# Patient Record
Sex: FEMALE | Race: OTHER | HISPANIC OR LATINO | Employment: FULL TIME | URBAN - METROPOLITAN AREA
[De-identification: names, ages, dates, MRNs, and addresses within clinical notes are randomized per-mention and may not be internally consistent; named-entity substitution may affect disease eponyms.]

---

## 2018-08-23 PROBLEM — Z00.00 HEALTH MAINTENANCE EXAMINATION: Status: RESOLVED | Noted: 2018-07-17 | Resolved: 2018-08-23

## 2018-10-03 PROBLEM — J30.9 ALLERGIC RHINITIS: Status: ACTIVE | Noted: 2018-10-03

## 2018-11-13 PROBLEM — F41.8 SITUATIONAL ANXIETY: Status: ACTIVE | Noted: 2018-11-13

## 2019-02-14 PROBLEM — J01.90 ACUTE SINUSITIS: Status: ACTIVE | Noted: 2019-02-14

## 2019-03-29 PROBLEM — J02.9 SORE THROAT: Status: ACTIVE | Noted: 2019-03-29

## 2019-06-04 PROBLEM — J02.9 SORE THROAT: Status: RESOLVED | Noted: 2019-03-29 | Resolved: 2019-06-04

## 2019-06-04 PROBLEM — H10.31 ACUTE BACTERIAL CONJUNCTIVITIS OF RIGHT EYE: Status: RESOLVED | Noted: 2019-03-29 | Resolved: 2019-06-04

## 2020-01-28 PROBLEM — J06.9 VIRAL URI: Status: RESOLVED | Noted: 2019-10-08 | Resolved: 2020-01-28

## 2020-03-31 PROBLEM — J45.20 MILD INTERMITTENT ASTHMA WITHOUT COMPLICATION: Status: ACTIVE | Noted: 2020-03-31

## 2020-07-15 PROBLEM — Z11.3 ROUTINE SCREENING FOR STI (SEXUALLY TRANSMITTED INFECTION): Status: RESOLVED | Noted: 2019-02-18 | Resolved: 2020-07-15

## 2021-01-13 PROBLEM — R11.0 NAUSEA: Status: RESOLVED | Noted: 2019-10-08 | Resolved: 2021-01-13

## 2021-09-28 PROBLEM — Z00.00 ANNUAL PHYSICAL EXAM: Status: ACTIVE | Noted: 2021-09-28

## 2022-10-14 ENCOUNTER — OFFICE VISIT (OUTPATIENT)
Dept: INTERNAL MEDICINE CLINIC | Facility: OTHER | Age: 27
End: 2022-10-14
Payer: COMMERCIAL

## 2022-10-14 VITALS
SYSTOLIC BLOOD PRESSURE: 100 MMHG | TEMPERATURE: 99.1 F | OXYGEN SATURATION: 98 % | WEIGHT: 146 LBS | HEART RATE: 80 BPM | DIASTOLIC BLOOD PRESSURE: 68 MMHG | HEIGHT: 62 IN | BODY MASS INDEX: 26.87 KG/M2

## 2022-10-14 DIAGNOSIS — Z13.220 SCREENING FOR LIPID DISORDERS: ICD-10-CM

## 2022-10-14 DIAGNOSIS — Z00.00 ANNUAL PHYSICAL EXAM: Primary | ICD-10-CM

## 2022-10-14 DIAGNOSIS — Z23 NEED FOR TDAP VACCINATION: ICD-10-CM

## 2022-10-14 DIAGNOSIS — M54.6 ACUTE LEFT-SIDED THORACIC BACK PAIN: ICD-10-CM

## 2022-10-14 DIAGNOSIS — Z13.0 SCREENING FOR DEFICIENCY ANEMIA: ICD-10-CM

## 2022-10-14 DIAGNOSIS — Z13.1 SCREENING FOR DIABETES MELLITUS: ICD-10-CM

## 2022-10-14 DIAGNOSIS — Z23 FLU VACCINE NEED: ICD-10-CM

## 2022-10-14 PROBLEM — B34.9 VIRAL INFECTION, UNSPECIFIED: Status: RESOLVED | Noted: 2022-01-14 | Resolved: 2022-10-14

## 2022-10-14 PROCEDURE — 90715 TDAP VACCINE 7 YRS/> IM: CPT | Performed by: NURSE PRACTITIONER

## 2022-10-14 PROCEDURE — 90472 IMMUNIZATION ADMIN EACH ADD: CPT | Performed by: NURSE PRACTITIONER

## 2022-10-14 PROCEDURE — 99395 PREV VISIT EST AGE 18-39: CPT | Performed by: NURSE PRACTITIONER

## 2022-10-14 PROCEDURE — 90686 IIV4 VACC NO PRSV 0.5 ML IM: CPT | Performed by: NURSE PRACTITIONER

## 2022-10-14 PROCEDURE — 90471 IMMUNIZATION ADMIN: CPT | Performed by: NURSE PRACTITIONER

## 2022-10-14 RX ORDER — METHOCARBAMOL 500 MG/1
500 TABLET, FILM COATED ORAL
Qty: 30 TABLET | Refills: 0 | Status: SHIPPED | OUTPATIENT
Start: 2022-10-14

## 2022-10-14 NOTE — ASSESSMENT & PLAN NOTE
- palpable knot on exam  - start Robaxin q h s  P r n   - start Aleve p r n   - moist heat to affected area  - light stretching  - massage therapy

## 2022-10-14 NOTE — ASSESSMENT & PLAN NOTE
- healthy 23-year-old female  - update routine labs  - continue healthy diet and routine exercise  - influenza and Tdap vaccines given today

## 2022-10-14 NOTE — PATIENT INSTRUCTIONS
Start robaxin before bed, do not drive on this medication  Aleve twice a day  Moist heat  Light stretching       Wellness Visit for Adults   AMBULATORY CARE:   A wellness visit  is when you see your healthcare provider to get screened for health problems  Your healthcare provider will also give you advice on how to stay healthy  Write down your questions so you remember to ask them  Ask your healthcare provider how often you should have a wellness visit  What happens at a wellness visit:  Your healthcare provider will ask about your health, and your family history of health problems  This includes high blood pressure, heart disease, and cancer  He or she will ask if you have symptoms that concern you, if you smoke, and about your mood  You may also be asked about your intake of medicines, supplements, food, and alcohol  Any of the following may be done: Your weight  will be checked  Your height may also be checked so your body mass index (BMI) can be calculated  Your BMI shows if you are at a healthy weight  Your blood pressure  and heart rate will be checked  Your temperature may also be checked  Blood and urine tests  may be done  Blood tests may be done to check your cholesterol levels  Abnormal cholesterol levels increase your risk for heart disease and stroke  You may also need a blood or urine test to check for diabetes if you are at increased risk  Urine tests may be done to look for signs of an infection or kidney disease  A physical exam  includes checking your heartbeat and lungs with a stethoscope  Your healthcare provider may also check your skin to look for sun damage  Screening tests  may be recommended  A screening test is done to check for diseases that may not cause symptoms  The screening tests you may need depend on your age, gender, family history, and lifestyle habits  For example, colorectal screening may be recommended if you are 48years old or older      Screening tests you need if you are a woman:   A Pap smear  is used to screen for cervical cancer  Pap smears are usually done every 3 to 5 years depending on your age  You may need them more often if you have had abnormal Pap smear test results in the past  Ask your healthcare provider how often you should have a Pap smear  A mammogram  is an x-ray of your breasts to screen for breast cancer  Experts recommend mammograms every 2 years starting at age 48 years  You may need a mammogram at age 52 years or younger if you have an increased risk for breast cancer  Talk to your healthcare provider about when you should start having mammograms and how often you need them  Vaccines you may need:   Get an influenza vaccine  every year  The influenza vaccine protects you from the flu  Several types of viruses cause the flu  The viruses change over time, so new vaccines are made each year  Get a tetanus-diphtheria (Td) booster vaccine  every 10 years  This vaccine protects you against tetanus and diphtheria  Tetanus is a severe infection that may cause painful muscle spasms and lockjaw  Diphtheria is a severe bacterial infection that causes a thick covering in the back of your mouth and throat  Get a human papillomavirus (HPV) vaccine  if you are female and aged 23 to 32 or male 23 to 24 and never received it  This vaccine protects you from HPV infection  HPV is the most common infection spread by sexual contact  HPV may also cause vaginal, penile, and anal cancers  Get a pneumococcal vaccine  if you are aged 72 years or older  The pneumococcal vaccine is an injection given to protect you from pneumococcal disease  Pneumococcal disease is an infection caused by pneumococcal bacteria  The infection may cause pneumonia, meningitis, or an ear infection  Get a shingles vaccine  if you are 60 or older, even if you have had shingles before  The shingles vaccine is an injection to protect you from the varicella-zoster virus   This is the same virus that causes chickenpox  Shingles is a painful rash that develops in people who had chickenpox or have been exposed to the virus  How to eat healthy:  My Plate is a model for planning healthy meals  It shows the types and amounts of foods that should go on your plate  Fruits and vegetables make up about half of your plate, and grains and protein make up the other half  A serving of dairy is included on the side of your plate  The amount of calories and serving sizes you need depends on your age, gender, weight, and height  Examples of healthy foods are listed below:  Eat a variety of vegetables  such as dark green, red, and orange vegetables  You can also include canned vegetables low in sodium (salt) and frozen vegetables without added butter or sauces  Eat a variety of fresh fruits , canned fruit in 100% juice, frozen fruit, and dried fruit  Include whole grains  At least half of the grains you eat should be whole grains  Examples include whole-wheat bread, wheat pasta, brown rice, and whole-grain cereals such as oatmeal     Eat a variety of protein foods such as seafood (fish and shellfish), lean meat, and poultry without skin (turkey and chicken)  Examples of lean meats include pork leg, shoulder, or tenderloin, and beef round, sirloin, tenderloin, and extra lean ground beef  Other protein foods include eggs and egg substitutes, beans, peas, soy products, nuts, and seeds  Choose low-fat dairy products such as skim or 1% milk or low-fat yogurt, cheese, and cottage cheese  Limit unhealthy fats  such as butter, hard margarine, and shortening  Exercise:  Exercise at least 30 minutes per day on most days of the week  Some examples of exercise include walking, biking, dancing, and swimming  You can also fit in more physical activity by taking the stairs instead of the elevator or parking farther away from stores  Include muscle strengthening activities 2 days each week   Regular exercise provides many health benefits  It helps you manage your weight, and decreases your risk for type 2 diabetes, heart disease, stroke, and high blood pressure  Exercise can also help improve your mood  Ask your healthcare provider about the best exercise plan for you  General health and safety guidelines:   Do not smoke  Nicotine and other chemicals in cigarettes and cigars can cause lung damage  Ask your healthcare provider for information if you currently smoke and need help to quit  E-cigarettes or smokeless tobacco still contain nicotine  Talk to your healthcare provider before you use these products  Limit alcohol  A drink of alcohol is 12 ounces of beer, 5 ounces of wine, or 1½ ounces of liquor  Lose weight, if needed  Being overweight increases your risk of certain health conditions  These include heart disease, high blood pressure, type 2 diabetes, and certain types of cancer  Protect your skin  Do not sunbathe or use tanning beds  Use sunscreen with a SPF 15 or higher  Apply sunscreen at least 15 minutes before you go outside  Reapply sunscreen every 2 hours  Wear protective clothing, hats, and sunglasses when you are outside  Drive safely  Always wear your seatbelt  Make sure everyone in your car wears a seatbelt  A seatbelt can save your life if you are in an accident  Do not use your cell phone when you are driving  This could distract you and cause an accident  Pull over if you need to make a call or send a text message  Practice safe sex  Use latex condoms if are sexually active and have more than one partner  Your healthcare provider may recommend screening tests for sexually transmitted infections (STIs)  Wear helmets, lifejackets, and protective gear  Always wear a helmet when you ride a bike or motorcycle, go skiing, or play sports that could cause a head injury  Wear protective equipment when you play sports   Wear a lifejacket when you are on a boat or doing water sports  © Copyright [employer] 2022 Information is for End User's use only and may not be sold, redistributed or otherwise used for commercial purposes  All illustrations and images included in CareNotes® are the copyrighted property of A D A M , Inc  or Kelly Sanchez  The above information is an  only  It is not intended as medical advice for individual conditions or treatments  Talk to your doctor, nurse or pharmacist before following any medical regimen to see if it is safe and effective for you

## 2022-10-14 NOTE — PROGRESS NOTES
ADULT ANNUAL [address] [facility] PRIMARY CARE [place]    NAME: Unruly Reid  AGE: 33 y o  SEX: female  : 1995     DATE: 10/14/2022     Assessment and Plan:     Problem List Items Addressed This Visit        Other    Annual physical exam - Primary     - healthy 52-year-old female  - update routine labs  - continue healthy diet and routine exercise  - influenza and Tdap vaccines given today         Acute left-sided thoracic back pain     - palpable knot on exam  - start Robaxin q h s  P r n   - start Aleve p r n   - moist heat to affected area  - light stretching  - massage therapy         Relevant Medications    methocarbamol (ROBAXIN) 500 mg tablet      Other Visit Diagnoses     Flu vaccine need        Relevant Orders    influenza vaccine, quadrivalent, 0 5 mL, preservative-free, for adult and pediatric patients 6 mos+ (AFLURIA, FLUARIX, FLULAVAL, FLUZONE) (Completed)    Screening for lipid disorders        Relevant Orders    Lipid Panel with Direct LDL reflex    Screening for deficiency anemia        Relevant Orders    CBC and differential    Screening for diabetes mellitus        Relevant Orders    Comprehensive metabolic panel    Need for Tdap vaccination        Relevant Orders    TDAP VACCINE GREATER THAN OR EQUAL TO 6YO IM (Completed)          Immunizations and preventive care screenings were discussed with patient today  Appropriate education was printed on patient's after visit summary  Depression Screening and Follow-up Plan: Patient was screened for depression during today's encounter  They screened negative with a PHQ-2 score of 0  BMI Counseling: Body mass index is 26 7 kg/m²  The BMI is above normal  Nutrition recommendations include 3-5 servings of fruits/vegetables daily, moderation in carbohydrate intake, increasing intake of lean protein and reducing intake of saturated fat and trans fat   Exercise recommendations include moderate aerobic physical activity for 150 minutes/week  Return in about 1 year (around 10/14/2023) for Annual physical      Chief Complaint:     Chief Complaint   Patient presents with   • Physical Exam     yearly   • Immunizations     Flu & adacel      History of Present Illness:     Adult Annual Physical   Patient here for a comprehensive physical exam  She is concerned for pain behind her left shoulder about 1 5 weeks ago  The pain radiates down her left arm intermittently  She can feel the strain with neck movement  Diet and Physical Activity  · Diet/Nutrition: well balanced diet  · Exercise: 3-4 times a week on average  Depression Screening  PHQ-2/9 Depression Screening    Little interest or pleasure in doing things: 0 - not at all  Feeling down, depressed, or hopeless: 0 - not at all  PHQ-2 Score: 0  PHQ-2 Interpretation: Negative depression screen       General Health  · Sleep: sleeps well  6-7 hours per night   · Hearing: normal - bilateral   · Vision: no vision problems and wears glasses  · Dental: due for dental exam      /GYN Health  · Last menstrual period: 9/16/2022, regular 7 days   · Contraceptive method: barrier methods  Review of Systems:     Review of Systems   Constitutional: Negative for activity change, appetite change, chills, diaphoresis, fatigue, fever and unexpected weight change  Eyes: Negative for visual disturbance  Respiratory: Negative for cough, chest tightness, shortness of breath and wheezing  Cardiovascular: Negative for chest pain and palpitations  Gastrointestinal: Negative for abdominal distention, abdominal pain, blood in stool, constipation, diarrhea, nausea and vomiting  Genitourinary: Negative for difficulty urinating, dysuria, frequency, hematuria, urgency, vaginal bleeding, vaginal discharge and vaginal pain  Musculoskeletal: Positive for arthralgias (left shoulder) and neck pain  Skin: Negative for rash     Neurological: Negative for dizziness, seizures, syncope, light-headedness and headaches  Psychiatric/Behavioral: Negative for dysphoric mood and sleep disturbance  The patient is not nervous/anxious  Past Medical History:     Past Medical History:   Diagnosis Date   • Acute sinusitis 2/14/2019   • Acute suppurative otitis media of right ear without spontaneous rupture of tympanic membrane 2/15/2019   • Asthma     stable on albuterol inhaler   • Central retinal vein occlusion of left eye 2020   • Encntr for gyn exam (general) (routine) w/o abn findings 2/18/2019   • Situational anxiety 11/13/2018   • Viral URI with cough 8/23/2018      Past Surgical History:     Past Surgical History:   Procedure Laterality Date   • CHOLECYSTECTOMY  12/2017   • WISDOM TOOTH EXTRACTION        Social History:     Social History     Socioeconomic History   • Marital status: Single     Spouse name: None   • Number of children: None   • Years of education: None   • Highest education level: None   Occupational History   • None   Tobacco Use   • Smoking status: Never Smoker   • Smokeless tobacco: Never Used   Vaping Use   • Vaping Use: Never used   Substance and Sexual Activity   • Alcohol use:  Yes     Alcohol/week: 1 0 standard drink     Types: 1 Glasses of wine per week     Comment: social   • Drug use: No   • Sexual activity: Yes     Partners: Male     Birth control/protection: Condom Male   Other Topics Concern   • None   Social History Narrative   • None     Social Determinants of Health     Financial Resource Strain: Not on file   Food Insecurity: Not on file   Transportation Needs: Not on file   Physical Activity: Not on file   Stress: Not on file   Social Connections: Not on file   Intimate Partner Violence: Not on file   Housing Stability: Not on file      Family History:     Family History   Problem Relation Age of Onset   • Hypertension Mother    • Diabetes Mother    • SRUTHI disease Mother    • Hyperlipidemia Mother    • Hyperlipidemia Father    • Asthma Father    • Stroke Maternal Grandmother    • Hypertension Maternal Grandmother    • Cancer Maternal Grandfather         Prostate and Lung Cancer   • Stroke Paternal Grandmother    • Diabetes Paternal Grandfather    • Heart disease Paternal Grandfather       Current Medications:     Current Outpatient Medications   Medication Sig Dispense Refill   • Calcium Carbonate (CALCIUM 600 PO) Take by mouth in the morning     • folic acid (FOLVITE) 210 MCG tablet Take 800 mcg by mouth daily     • methocarbamol (ROBAXIN) 500 mg tablet Take 1 tablet (500 mg total) by mouth daily at bedtime as needed for muscle spasms 30 tablet 0   • Multiple Vitamin (MULTIVITAMIN) tablet Take 1 tablet by mouth daily       No current facility-administered medications for this visit  Allergies: Allergies   Allergen Reactions   • Sprintec 28 [Norgestimate-Eth Estradiol] Other (See Comments)     Blood clot in eye   • Sulfa Antibiotics Rash      Physical Exam:     /68 (BP Location: Left arm, Patient Position: Sitting, Cuff Size: Adult)   Pulse 80   Temp 99 1 °F (37 3 °C) (Temporal)   Ht 5' 2" (1 575 m)   Wt 66 2 kg (146 lb)   SpO2 98% Comment: ra  BMI 26 70 kg/m²     Physical Exam  Vitals and nursing note reviewed  Constitutional:       General: She is not in acute distress  Appearance: Normal appearance  She is well-developed and normal weight  She is not diaphoretic  HENT:      Head: Normocephalic and atraumatic  Right Ear: Tympanic membrane and external ear normal       Left Ear: Tympanic membrane and external ear normal       Nose: Nose normal  No congestion or rhinorrhea  Mouth/Throat:      Mouth: Mucous membranes are moist       Pharynx: Oropharynx is clear  No oropharyngeal exudate or posterior oropharyngeal erythema  Eyes:      Extraocular Movements: Extraocular movements intact  Conjunctiva/sclera: Conjunctivae normal       Pupils: Pupils are equal, round, and reactive to light     Neck: Vascular: No carotid bruit  Cardiovascular:      Rate and Rhythm: Normal rate and regular rhythm  Heart sounds: Normal heart sounds  No murmur heard  Pulmonary:      Effort: Pulmonary effort is normal  No respiratory distress  Breath sounds: Normal breath sounds  No wheezing, rhonchi or rales  Abdominal:      General: Bowel sounds are normal  There is no distension  Palpations: Abdomen is soft  There is no mass  Tenderness: There is no abdominal tenderness  There is no guarding  Musculoskeletal:      Cervical back: Neck supple  Pain with movement present  No spinous process tenderness or muscular tenderness  Thoracic back: Tenderness (left side) present  Back:       Right lower leg: No edema  Left lower leg: No edema  Skin:     General: Skin is warm and dry  Neurological:      Mental Status: She is alert and oriented to person, place, and time  Mental status is at baseline  Motor: No weakness        Gait: Gait normal    Psychiatric:         Mood and Affect: Mood normal          Behavior: Behavior normal           Alice Sanchez, [address] PRIMARY CARE [facility]

## 2022-12-06 ENCOUNTER — OFFICE VISIT (OUTPATIENT)
Dept: URGENT CARE | Age: 27
End: 2022-12-06

## 2022-12-06 VITALS — TEMPERATURE: 98.2 F | OXYGEN SATURATION: 99 % | RESPIRATION RATE: 18 BRPM | HEART RATE: 82 BPM

## 2022-12-06 DIAGNOSIS — J06.9 ACUTE URI: Primary | ICD-10-CM

## 2022-12-06 RX ORDER — PSEUDOEPHEDRINE HYDROCHLORIDE 60 MG/1
60 TABLET, FILM COATED ORAL EVERY 8 HOURS PRN
Qty: 30 TABLET | Refills: 0 | Status: SHIPPED | OUTPATIENT
Start: 2022-12-06

## 2022-12-06 RX ORDER — FLUTICASONE PROPIONATE 50 MCG
1 SPRAY, SUSPENSION (ML) NASAL DAILY
Qty: 11.1 ML | Refills: 0 | Status: SHIPPED | OUTPATIENT
Start: 2022-12-06

## 2022-12-07 NOTE — PROGRESS NOTES
[facility]        NAME: Amber Wilkins is a 33 y o  female  : 1995    MRN: [id number]  DATE: 2022  TIME: 7:20 PM    Assessment and Plan   Acute URI [J06 9]  1  Acute URI  pseudoephedrine (SUDAFED) 60 mg tablet    fluticasone (FLONASE) 50 mcg/act nasal spray        Patient presents with c/o slight cough, nasal congestion, rhinorrhea for a little over a week   at home with similar viral symptoms however was given an abx for sinusitis  Patient denies fevers, denies colored/foul mucous  Has not been consistent with taking medications for symptoms  Assessment notes clear breath sounds, no adenopathy  Congestion , PND present  No swelling, erythema  Discussed at this time to treat as viral  Not beneficial to start abx with no indication of bacterial infection    Patient Instructions       Follow up with PCP as needed    Chief Complaint     Chief Complaint   Patient presents with   • Nasal Congestion     Nasal congestion Sx for 1 5 wks   sick at home and Rx abx  COVID home test neg yesterday  COVID/Flu vaccinated  Theraflu [facility]         History of Present Illness       Patient presents with c/o slight cough, nasal congestion, rhinorrhea for a little over a week   at home with similar viral symptoms however was given an abx for sinusitis  Patient denies fevers, denies colored/foul mucous  Has not been consistent with taking medications for symptoms  Assessment notes clear breath sounds, no adenopathy  Congestion , PND present  No swelling, erythema  Discussed at this time to treat as viral  Not beneficial to start abx with no indication of bacterial infection      Review of Systems   Review of Systems   Constitutional: Negative for chills and fever  HENT: Positive for congestion and postnasal drip  Negative for ear pain, sinus pain and sore throat  Eyes: Negative for pain and itching  Respiratory: Negative for cough, shortness of breath and wheezing  Cardiovascular: Negative for chest pain and palpitations  Gastrointestinal: Negative for abdominal pain, constipation, diarrhea, nausea and vomiting  Genitourinary: Negative for difficulty urinating and hematuria  Musculoskeletal: Negative for arthralgias and myalgias  Skin: Negative for rash  Neurological: Negative for dizziness, light-headedness and headaches  Psychiatric/Behavioral: Negative for agitation and sleep disturbance  The patient is not nervous/anxious            Current Medications       Current Outpatient Medications:   •  fluticasone (FLONASE) 50 mcg/act nasal spray, 1 spray into each nostril daily, Disp: 11 1 mL, Rfl: 0  •  pseudoephedrine (SUDAFED) 60 mg tablet, Take 1 tablet (60 mg total) by mouth every 8 (eight) hours as needed for congestion, Disp: 30 tablet, Rfl: 0  •  Calcium Carbonate (CALCIUM 600 PO), Take by mouth in the morning, Disp: , Rfl:   •  folic acid (FOLVITE) 164 MCG tablet, Take 800 mcg by mouth daily, Disp: , Rfl:   •  methocarbamol (ROBAXIN) 500 mg tablet, Take 1 tablet (500 mg total) by mouth daily at bedtime as needed for muscle spasms, Disp: 30 tablet, Rfl: 0  •  Multiple Vitamin (MULTIVITAMIN) tablet, Take 1 tablet by mouth daily, Disp: , Rfl:     Current Allergies     Allergies as of 12/06/2022 - Reviewed 12/06/2022   Allergen Reaction Noted   • Sprintec 28 [norgestimate-eth estradiol] Other (See Comments) 03/07/2022   • Sulfa antibiotics Rash 07/17/2018            The following portions of the patient's history were reviewed and updated as appropriate: allergies, current medications, past family history, past medical history, past social history, past surgical history and problem list      Past Medical History:   Diagnosis Date   • Acute sinusitis 2/14/2019   • Acute suppurative otitis media of right ear without spontaneous rupture of tympanic membrane 2/15/2019   • Asthma     stable on albuterol inhaler   • Central retinal vein occlusion of left eye 2020   • Encntr for gyn exam (general) (routine) w/o abn findings 2/18/2019   • Situational anxiety 11/13/2018   • Viral URI with cough 8/23/2018       Past Surgical History:   Procedure Laterality Date   • CHOLECYSTECTOMY  12/2017   • WISDOM TOOTH EXTRACTION         Family History   Problem Relation Age of Onset   • Hypertension Mother    • Diabetes Mother    • SRUTHI disease Mother    • Hyperlipidemia Mother    • Hyperlipidemia Father    • Asthma Father    • Stroke Maternal Grandmother    • Hypertension Maternal Grandmother    • Cancer Maternal Grandfather         Prostate and Lung Cancer   • Stroke Paternal Grandmother    • Diabetes Paternal Grandfather    • Heart disease Paternal Grandfather          Medications have been verified  Objective   Pulse 82   Temp 98 2 °F (36 8 °C) (Tympanic)   Resp 18   SpO2 99%   No LMP recorded  Physical Exam     Physical Exam  Vitals reviewed  Constitutional:       General: She is not in acute distress  Appearance: Normal appearance  She is not ill-appearing  HENT:      Head: Normocephalic and atraumatic  Nose: Congestion and rhinorrhea present  Mouth/Throat:      Pharynx: No oropharyngeal exudate or posterior oropharyngeal erythema  Eyes:      Extraocular Movements: Extraocular movements intact  Conjunctiva/sclera: Conjunctivae normal    Cardiovascular:      Rate and Rhythm: Normal rate and regular rhythm  Pulses: Normal pulses  Heart sounds: Normal heart sounds  Pulmonary:      Effort: Pulmonary effort is normal       Breath sounds: Normal breath sounds  Musculoskeletal:      Cervical back: Normal range of motion  Lymphadenopathy:      Cervical: No cervical adenopathy  Skin:     General: Skin is warm  Neurological:      General: No focal deficit present  Mental Status: She is alert     Psychiatric:         Mood and Affect: Mood normal          Behavior: Behavior normal          Judgment: Judgment normal

## 2022-12-13 ENCOUNTER — NURSE TRIAGE (OUTPATIENT)
Dept: OTHER | Facility: OTHER | Age: 27
End: 2022-12-13

## 2022-12-13 ENCOUNTER — OFFICE VISIT (OUTPATIENT)
Dept: URGENT CARE | Age: 27
End: 2022-12-13

## 2022-12-13 VITALS
RESPIRATION RATE: 18 BRPM | BODY MASS INDEX: 26.87 KG/M2 | WEIGHT: 146 LBS | HEIGHT: 62 IN | TEMPERATURE: 97.5 F | SYSTOLIC BLOOD PRESSURE: 102 MMHG | OXYGEN SATURATION: 99 % | HEART RATE: 75 BPM | DIASTOLIC BLOOD PRESSURE: 68 MMHG

## 2022-12-13 DIAGNOSIS — J01.00 ACUTE NON-RECURRENT MAXILLARY SINUSITIS: Primary | ICD-10-CM

## 2022-12-13 RX ORDER — AZITHROMYCIN 250 MG/1
TABLET, FILM COATED ORAL
Qty: 6 TABLET | Refills: 0 | Status: SHIPPED | OUTPATIENT
Start: 2022-12-13 | End: 2022-12-17

## 2022-12-14 NOTE — PROGRESS NOTES
[facility] [employer] [facility]        NAME: Kailash Blair is a 33 y o  female  : 1995    MRN: [id number]  DATE: 2022  TIME: 8:39 AM    Assessment and Plan   Acute non-recurrent maxillary sinusitis [J01 00]  1  Acute non-recurrent maxillary sinusitis  azithromycin (ZITHROMAX) 250 mg tablet        Patient presents with congestion, sinus pressure, inability to clear mucous  Has been ongoing for almost 2 weeks  Denies fevers  Has been using prescribed flonase/sudafed without relief  Assessment notes congestion, adenopathy, sinus pressure/TTP   Will treat at this time for sinus infection  Continue OTC medications    Patient Instructions       Follow up with PCP as needed    Chief Complaint     Chief Complaint   Patient presents with   • Cold Like Symptoms     Cough, post nasal drip, tightness in chest [facility] covid test on Saturday negative result         History of Present Illness       Patient presents with congestion, sinus pressure, inability to clear mucous  Has been ongoing for almost 2 weeks  Denies fevers  Has been using prescribed flonase/sudafed without relief  Assessment notes congestion, adenopathy, sinus pressure/TTP   Will treat at this time for sinus infection  Continue OTC medications      Review of Systems   Review of Systems   Constitutional: Negative for chills and fever  HENT: Positive for congestion, postnasal drip and sinus pressure  Negative for ear pain, sinus pain and sore throat  Eyes: Negative for pain and itching  Respiratory: Negative for cough, shortness of breath and wheezing  Cardiovascular: Negative for chest pain and palpitations  Gastrointestinal: Negative for abdominal pain, constipation, diarrhea, nausea and vomiting  Genitourinary: Negative for difficulty urinating and hematuria  Musculoskeletal: Negative for arthralgias and myalgias  Skin: Negative for rash  Neurological: Negative for dizziness, light-headedness and headaches  Psychiatric/Behavioral: Negative for agitation and sleep disturbance  The patient is not nervous/anxious            Current Medications       Current Outpatient Medications:   •  Calcium Carbonate (CALCIUM 600 PO), Take by mouth in the morning, Disp: , Rfl:   •  fluticasone (FLONASE) 50 mcg/act nasal spray, 1 spray into each nostril daily, Disp: 11 1 mL, Rfl: 0  •  folic acid (FOLVITE) 536 MCG tablet, Take 800 mcg by mouth daily, Disp: , Rfl:   •  Multiple Vitamin (MULTIVITAMIN) tablet, Take 1 tablet by mouth daily, Disp: , Rfl:   •  pseudoephedrine (SUDAFED) 60 mg tablet, Take 1 tablet (60 mg total) by mouth every 8 (eight) hours as needed for congestion, Disp: 30 tablet, Rfl: 0  •  azithromycin (ZITHROMAX) 250 mg tablet, Take 2 tablets today then 1 tablet daily x 4 days (Patient not taking: Reported on 12/13/2022), Disp: 6 tablet, Rfl: 0  •  methocarbamol (ROBAXIN) 500 mg tablet, Take 1 tablet (500 mg total) by mouth daily at bedtime as needed for muscle spasms (Patient not taking: Reported on 12/13/2022), Disp: 30 tablet, Rfl: 0    Current Allergies     Allergies as of 12/13/2022 - Reviewed 12/13/2022   Allergen Reaction Noted   • Sprintec 28 [norgestimate-eth estradiol] Other (See Comments) 03/07/2022   • Sulfa antibiotics Rash 07/17/2018            The following portions of the patient's history were reviewed and updated as appropriate: allergies, current medications, past family history, past medical history, past social history, past surgical history and problem list      Past Medical History:   Diagnosis Date   • Acute sinusitis 2/14/2019   • Acute suppurative otitis media of right ear without spontaneous rupture of tympanic membrane 2/15/2019   • Asthma     stable on albuterol inhaler   • Central retinal vein occlusion of left eye 2020   • Encntr for gyn exam (general) (routine) w/o abn findings 2/18/2019   • Situational anxiety 11/13/2018   • Viral URI with cough 8/23/2018       Past Surgical History: Procedure Laterality Date   • CHOLECYSTECTOMY  12/2017   • WISDOM TOOTH EXTRACTION         Family History   Problem Relation Age of Onset   • Hypertension Mother    • Diabetes Mother    • SRUTHI disease Mother    • Hyperlipidemia Mother    • Hyperlipidemia Father    • Asthma Father    • Stroke Maternal Grandmother    • Hypertension Maternal Grandmother    • Cancer Maternal Grandfather         Prostate and Lung Cancer   • Stroke Paternal Grandmother    • Diabetes Paternal Grandfather    • Heart disease Paternal Grandfather          Medications have been verified  Objective   /68   Pulse 75   Temp 97 5 °F (36 4 °C)   Resp 18   Ht 5' 2" (1 575 m)   Wt 66 2 kg (146 lb)   LMP 11/18/2022   SpO2 99%   BMI 26 70 kg/m²   Patient's last menstrual period was 11/18/2022  Physical Exam     Physical Exam  Vitals reviewed  Constitutional:       General: She is not in acute distress  Appearance: Normal appearance  She is not ill-appearing  HENT:      Head: Normocephalic and atraumatic  Right Ear: Tympanic membrane normal       Left Ear: Tympanic membrane normal       Nose: Congestion and rhinorrhea present  Right Sinus: Maxillary sinus tenderness present  Left Sinus: Maxillary sinus tenderness present  Eyes:      Extraocular Movements: Extraocular movements intact  Conjunctiva/sclera: Conjunctivae normal    Pulmonary:      Effort: Pulmonary effort is normal    Lymphadenopathy:      Cervical: Cervical adenopathy present  Skin:     General: Skin is warm  Neurological:      General: No focal deficit present  Mental Status: She is alert     Psychiatric:         Mood and Affect: Mood normal          Behavior: Behavior normal          Judgment: Judgment normal

## 2022-12-14 NOTE — TELEPHONE ENCOUNTER
Reason for Disposition  • [1] MILD difficulty breathing (e g , minimal/no SOB at rest, SOB with walking, pulse <100) AND [2] NEW-onset or WORSE than normal    Answer Assessment - Initial Assessment Questions  1  RESPIRATORY STATUS: "Describe your breathing?" (e g , wheezing, shortness of breath, unable to speak, severe coughing)       Short of Breath     2  ONSET: "When did this breathing problem begin?"       Couple days ago     3  PATTERN "Does the difficult breathing come and go, or has it been constant since it started?"       Comes and goes     4  SEVERITY: "How bad is your breathing?" (e g , mild, moderate, severe)     - MILD: No SOB at rest, mild SOB with walking, speaks normally in sentences, can lay down, no retractions, pulse < 100      - MODERATE: SOB at rest, SOB with minimal exertion and prefers to sit, cannot lie down flat, speaks in phrases, mild retractions, audible wheezing, pulse 100-120      - SEVERE: Very SOB at rest, speaks in single words, struggling to breathe, sitting hunched forward, retractions, pulse > 120       Mild to moderate     5  RECURRENT SYMPTOM: "Have you had difficulty breathing before?" If Yes, ask: "When was the last time?" and "What happened that time?"       Denies    6  CARDIAC HISTORY: "Do you have any history of heart disease?" (e g , heart attack, angina, bypass surgery, angioplasty)       Denies    7  LUNG HISTORY: "Do you have any history of lung disease?"  (e g , pulmonary embolus, asthma, emphysema)      Yes, Asthma     8  CAUSE: "What do you think is causing the breathing problem?"      Unknown     9  OTHER SYMPTOMS: "Do you have any other symptoms? (e g , dizziness, runny nose, cough, chest pain, fever)      Cough, runny nose, chest tightness     10  PREGNANCY: "Is there any chance you are pregnant?" "When was your last menstrual period?"        Denies    11   TRAVEL: "Have you traveled out of the country in the last month?" (e g , travel history, exposures) Denies    Protocols used: BREATHING DIFFICULTY-ADULT-AH

## 2022-12-14 NOTE — TELEPHONE ENCOUNTER
Regarding: Was prescribed medication for nasal congetion medication not helping  ----- Message from Mercy Dumont sent at 12/13/2022  6:26 PM EST -----  '' I was seen at the urgent care for nasal congestion I was prescribed pseudoephedrine (SUDAFED) 60 mg and fluticasone (FLONASE) 50 mcg/act nasal spray the medication is not helping because I'm having post nasal drip into my chest, I was wondering if I can gt an antibiotic to be call ''

## 2022-12-31 ENCOUNTER — APPOINTMENT (OUTPATIENT)
Dept: LAB | Facility: IMAGING CENTER | Age: 27
End: 2022-12-31

## 2022-12-31 DIAGNOSIS — Z13.220 SCREENING FOR LIPID DISORDERS: ICD-10-CM

## 2022-12-31 DIAGNOSIS — Z13.0 SCREENING FOR DEFICIENCY ANEMIA: ICD-10-CM

## 2022-12-31 DIAGNOSIS — Z13.1 SCREENING FOR DIABETES MELLITUS: ICD-10-CM

## 2022-12-31 LAB
ALBUMIN SERPL BCP-MCNC: 3.3 G/DL (ref 3.5–5)
ALP SERPL-CCNC: 39 U/L (ref 46–116)
ALT SERPL W P-5'-P-CCNC: 19 U/L (ref 12–78)
ANION GAP SERPL CALCULATED.3IONS-SCNC: 5 MMOL/L (ref 4–13)
AST SERPL W P-5'-P-CCNC: 12 U/L (ref 5–45)
BASOPHILS # BLD AUTO: 0.07 THOUSANDS/ÂΜL (ref 0–0.1)
BASOPHILS NFR BLD AUTO: 1 % (ref 0–1)
BILIRUB SERPL-MCNC: 0.97 MG/DL (ref 0.2–1)
BUN SERPL-MCNC: 15 MG/DL (ref 5–25)
CALCIUM ALBUM COR SERPL-MCNC: 9.2 MG/DL (ref 8.3–10.1)
CALCIUM SERPL-MCNC: 8.6 MG/DL (ref 8.3–10.1)
CHLORIDE SERPL-SCNC: 104 MMOL/L (ref 96–108)
CHOLEST SERPL-MCNC: 144 MG/DL
CO2 SERPL-SCNC: 29 MMOL/L (ref 21–32)
CREAT SERPL-MCNC: 0.65 MG/DL (ref 0.6–1.3)
EOSINOPHIL # BLD AUTO: 0.07 THOUSAND/ÂΜL (ref 0–0.61)
EOSINOPHIL NFR BLD AUTO: 1 % (ref 0–6)
ERYTHROCYTE [DISTWIDTH] IN BLOOD BY AUTOMATED COUNT: 12.4 % (ref 11.6–15.1)
GFR SERPL CREATININE-BSD FRML MDRD: 122 ML/MIN/1.73SQ M
GLUCOSE P FAST SERPL-MCNC: 86 MG/DL (ref 65–99)
HCT VFR BLD AUTO: 42.1 % (ref 34.8–46.1)
HDLC SERPL-MCNC: 53 MG/DL
HGB BLD-MCNC: 13.6 G/DL (ref 11.5–15.4)
IMM GRANULOCYTES # BLD AUTO: 0.02 THOUSAND/UL (ref 0–0.2)
IMM GRANULOCYTES NFR BLD AUTO: 0 % (ref 0–2)
LDLC SERPL CALC-MCNC: 79 MG/DL (ref 0–100)
LYMPHOCYTES # BLD AUTO: 2.67 THOUSANDS/ÂΜL (ref 0.6–4.47)
LYMPHOCYTES NFR BLD AUTO: 35 % (ref 14–44)
MCH RBC QN AUTO: 29.2 PG (ref 26.8–34.3)
MCHC RBC AUTO-ENTMCNC: 32.3 G/DL (ref 31.4–37.4)
MCV RBC AUTO: 91 FL (ref 82–98)
MONOCYTES # BLD AUTO: 0.5 THOUSAND/ÂΜL (ref 0.17–1.22)
MONOCYTES NFR BLD AUTO: 7 % (ref 4–12)
NEUTROPHILS # BLD AUTO: 4.21 THOUSANDS/ÂΜL (ref 1.85–7.62)
NEUTS SEG NFR BLD AUTO: 56 % (ref 43–75)
NRBC BLD AUTO-RTO: 0 /100 WBCS
PLATELET # BLD AUTO: 200 THOUSANDS/UL (ref 149–390)
PMV BLD AUTO: 12.5 FL (ref 8.9–12.7)
POTASSIUM SERPL-SCNC: 3.8 MMOL/L (ref 3.5–5.3)
PROT SERPL-MCNC: 7.1 G/DL (ref 6.4–8.4)
RBC # BLD AUTO: 4.65 MILLION/UL (ref 3.81–5.12)
SODIUM SERPL-SCNC: 138 MMOL/L (ref 135–147)
TRIGL SERPL-MCNC: 59 MG/DL
WBC # BLD AUTO: 7.54 THOUSAND/UL (ref 4.31–10.16)

## 2023-01-28 ENCOUNTER — OFFICE VISIT (OUTPATIENT)
Dept: URGENT CARE | Age: 28
End: 2023-01-28

## 2023-01-28 VITALS
DIASTOLIC BLOOD PRESSURE: 73 MMHG | BODY MASS INDEX: 23.04 KG/M2 | WEIGHT: 130 LBS | HEIGHT: 63 IN | SYSTOLIC BLOOD PRESSURE: 110 MMHG | HEART RATE: 80 BPM | TEMPERATURE: 97.9 F | RESPIRATION RATE: 18 BRPM | OXYGEN SATURATION: 98 %

## 2023-01-28 DIAGNOSIS — J02.9 SORE THROAT: ICD-10-CM

## 2023-01-28 DIAGNOSIS — B34.9 VIRAL SYNDROME: Primary | ICD-10-CM

## 2023-01-28 LAB — S PYO AG THROAT QL: NEGATIVE

## 2023-01-28 NOTE — PATIENT INSTRUCTIONS
No signs of bacterial infection on exam today  Signs & symptoms consistent with viral illness  Rapid strep negative  Ears free of erythema or bulging  OTC medications & supportive care as directed  Continue Flonase  Start Xyzal or Allegra OTC  Most viral illnesses last about 7-10 days with days 3-5 being the worst in severity of symptoms  If symptoms persist past 10 days, follow up with PCP  If symptoms worsen, proceed to the ER

## 2023-01-28 NOTE — PROGRESS NOTES
[facility] [employer] [facility]        NAME: Won Galvan is a 33 y o  female  : 1995    MRN: [id number]  DATE: 2023  TIME: 1:55 PM    Assessment and Plan   Viral syndrome [B34 9]  1  Viral syndrome        2  Sore throat  POCT rapid strepA            Patient Instructions     Rapid strep negative  No signs of bacterial infection on exam today  S&S consistent with viral illness  OTC meds & supportive care  Follow up with PCP in 2-3 days  Proceed to ER if symptoms worsen  Chief Complaint     Chief Complaint   Patient presents with   • Earache     Right ear pain, right sided throat pain began yesterday         History of Present Illness     33 y o  F  R ear pain & sore throat x 1 day  Denies cough, congestion, CP, SOB, fevers, chills  Tylenol OTC  Flonase  Denies sick contacts    Review of Systems   Review of Systems   Constitutional: Negative for chills, fatigue and fever  HENT: Positive for ear pain and sore throat  Negative for congestion, facial swelling, hearing loss, rhinorrhea, sinus pressure, sneezing and trouble swallowing  Eyes: Negative for pain, redness and visual disturbance  Respiratory: Negative for cough, chest tightness, shortness of breath and wheezing  Cardiovascular: Negative for chest pain and palpitations  Gastrointestinal: Negative for abdominal pain, diarrhea, nausea and vomiting  Genitourinary: Negative for dysuria, flank pain, hematuria and pelvic pain  Musculoskeletal: Negative for arthralgias, back pain and myalgias  Skin: Negative for color change and rash  Neurological: Negative for dizziness, seizures, syncope, weakness, light-headedness and headaches  Psychiatric/Behavioral: Negative for confusion, hallucinations and sleep disturbance  The patient is not nervous/anxious  All other systems reviewed and are negative          Current Medications       Current Outpatient Medications:   •  Calcium Carbonate (CALCIUM 600 PO), Take by mouth in the morning, Disp: , Rfl:   •  fluticasone (FLONASE) 50 mcg/act nasal spray, 1 spray into each nostril daily, Disp: 11 1 mL, Rfl: 0  •  folic acid (FOLVITE) 554 MCG tablet, Take 800 mcg by mouth daily, Disp: , Rfl:   •  Multiple Vitamin (MULTIVITAMIN) tablet, Take 1 tablet by mouth daily, Disp: , Rfl:   •  methocarbamol (ROBAXIN) 500 mg tablet, Take 1 tablet (500 mg total) by mouth daily at bedtime as needed for muscle spasms (Patient not taking: Reported on 12/13/2022), Disp: 30 tablet, Rfl: 0  •  pseudoephedrine (SUDAFED) 60 mg tablet, Take 1 tablet (60 mg total) by mouth every 8 (eight) hours as needed for congestion (Patient not taking: Reported on 1/28/2023), Disp: 30 tablet, Rfl: 0    Current Allergies     Allergies as of 01/28/2023 - Reviewed 01/28/2023   Allergen Reaction Noted   • Sprintec 28 [norgestimate-eth estradiol] Other (See Comments) 03/07/2022   • Sulfa antibiotics Rash 07/17/2018            The following portions of the patient's history were reviewed and updated as appropriate: allergies, current medications, past family history, past medical history, past social history, past surgical history and problem list      Past Medical History:   Diagnosis Date   • Acute sinusitis 2/14/2019   • Acute suppurative otitis media of right ear without spontaneous rupture of tympanic membrane 2/15/2019   • Asthma     stable on albuterol inhaler   • Central retinal vein occlusion of left eye 2020   • Encntr for gyn exam (general) (routine) w/o abn findings 2/18/2019   • Situational anxiety 11/13/2018   • Viral URI with cough 8/23/2018       Past Surgical History:   Procedure Laterality Date   • CHOLECYSTECTOMY  12/2017   • WISDOM TOOTH EXTRACTION         Family History   Problem Relation Age of Onset   • Hypertension Mother    • Diabetes Mother    • SRUTHI disease Mother    • Hyperlipidemia Mother    • Hyperlipidemia Father    • Asthma Father    • Stroke Maternal Grandmother    • Hypertension Maternal Grandmother    • Cancer Maternal Grandfather         Prostate and Lung Cancer   • Stroke Paternal Grandmother    • Diabetes Paternal Grandfather    • Heart disease Paternal Grandfather          Medications have been verified  Objective   /73   Pulse 80   Temp 97 9 °F (36 6 °C)   Resp 18   Ht 5' 3" (1 6 m)   Wt 59 kg (130 lb)   SpO2 98%   BMI 23 03 kg/m²   No LMP recorded  Physical Exam     Physical Exam  Vitals reviewed  Constitutional:       General: She is not in acute distress  Appearance: Normal appearance  She is not toxic-appearing  HENT:      Head: Normocephalic  Right Ear: Tympanic membrane normal  Tympanic membrane is not erythematous or bulging  Left Ear: Tympanic membrane normal  Tympanic membrane is not erythematous or bulging  Nose: No congestion or rhinorrhea  Right Sinus: No maxillary sinus tenderness or frontal sinus tenderness  Left Sinus: No maxillary sinus tenderness or frontal sinus tenderness  Mouth/Throat:      Pharynx: Uvula midline  No oropharyngeal exudate, posterior oropharyngeal erythema or uvula swelling  Tonsils: No tonsillar exudate or tonsillar abscesses  Comments: +PND with clear drainage  Eyes:      Extraocular Movements: Extraocular movements intact  Conjunctiva/sclera: Conjunctivae normal       Pupils: Pupils are equal, round, and reactive to light  Cardiovascular:      Rate and Rhythm: Normal rate and regular rhythm  Pulses: Normal pulses  Heart sounds: Normal heart sounds  Pulmonary:      Effort: Pulmonary effort is normal  No tachypnea or respiratory distress  Breath sounds: Normal breath sounds and air entry  No decreased breath sounds, wheezing, rhonchi or rales  Abdominal:      General: Bowel sounds are normal       Palpations: Abdomen is soft  Tenderness: There is no abdominal tenderness  There is no right CVA tenderness or guarding     Musculoskeletal:         General: Normal range of motion  Cervical back: Normal range of motion  Lymphadenopathy:      Cervical: No cervical adenopathy  Skin:     General: Skin is warm and dry  Neurological:      General: No focal deficit present  Mental Status: She is alert  Sensory: Sensation is intact  Motor: Motor function is intact  Coordination: Coordination is intact  Gait: Gait is intact  Deep Tendon Reflexes: Reflexes are normal and symmetric

## 2023-02-08 ENCOUNTER — OFFICE VISIT (OUTPATIENT)
Dept: INTERNAL MEDICINE CLINIC | Facility: CLINIC | Age: 28
End: 2023-02-08

## 2023-02-08 VITALS
BODY MASS INDEX: 23.21 KG/M2 | HEART RATE: 74 BPM | WEIGHT: 131 LBS | DIASTOLIC BLOOD PRESSURE: 80 MMHG | TEMPERATURE: 99.2 F | OXYGEN SATURATION: 98 % | HEIGHT: 63 IN | SYSTOLIC BLOOD PRESSURE: 110 MMHG

## 2023-02-08 DIAGNOSIS — J06.9 UPPER RESPIRATORY TRACT INFECTION, UNSPECIFIED TYPE: Primary | ICD-10-CM

## 2023-02-08 NOTE — PROGRESS NOTES
Assessment/Plan:    Diagnoses and all orders for this visit:    Upper respiratory tract infection, unspecified type       Encouraged hydration, discussed supportive management  Follow-up as needed       There are no Patient Instructions on file for this visit  Subjective:      Patient ID: Gamaliel Clayton is a 33 y o  female    Complains of sinus congestion for the last 2 days  Denies fever chills shortness of breath or chest pain    Works in  with multiple staff sick        Current Outpatient Medications:   •  Calcium Carbonate (CALCIUM 600 PO), Take by mouth in the morning, Disp: , Rfl:   •  fluticasone (FLONASE) 50 mcg/act nasal spray, 1 spray into each nostril daily, Disp: 11 1 mL, Rfl: 0  •  folic acid (FOLVITE) 598 MCG tablet, Take 800 mcg by mouth daily, Disp: , Rfl:   •  Multiple Vitamin (MULTIVITAMIN) tablet, Take 1 tablet by mouth daily, Disp: , Rfl:   •  methocarbamol (ROBAXIN) 500 mg tablet, Take 1 tablet (500 mg total) by mouth daily at bedtime as needed for muscle spasms (Patient not taking: Reported on 12/13/2022), Disp: 30 tablet, Rfl: 0  •  pseudoephedrine (SUDAFED) 60 mg tablet, Take 1 tablet (60 mg total) by mouth every 8 (eight) hours as needed for congestion (Patient not taking: Reported on 1/28/2023), Disp: 30 tablet, Rfl: 0     Past Medical History:   Diagnosis Date   • Acute sinusitis 2/14/2019   • Acute suppurative otitis media of right ear without spontaneous rupture of tympanic membrane 2/15/2019   • Asthma     stable on albuterol inhaler   • Central retinal vein occlusion of left eye 2020   • Encntr for gyn exam (general) (routine) w/o abn findings 2/18/2019   • Situational anxiety 11/13/2018   • Viral URI with cough 8/23/2018         Past Surgical History:   Procedure Laterality Date   • CHOLECYSTECTOMY  12/2017   • WISDOM TOOTH EXTRACTION           Allergies   Allergen Reactions   • Sprintec 28 [Norgestimate-Eth Estradiol] Other (See Comments)     Blood clot in eye   • Sulfa Antibiotics Rash       Recent Results (from the past 1008 hour(s))   CBC and differential    Collection Time: 12/31/22  7:22 AM   Result Value Ref Range    WBC 7 54 4 31 - 10 16 Thousand/uL    RBC 4 65 3 81 - 5 12 Million/uL    Hemoglobin 13 6 11 5 - 15 4 g/dL    Hematocrit 42 1 34 8 - 46 1 %    MCV 91 82 - 98 fL    MCH 29 2 26 8 - 34 3 pg    MCHC 32 3 31 4 - 37 4 g/dL    RDW 12 4 11 6 - 15 1 %    MPV 12 5 8 9 - 12 7 fL    Platelets 435 615 - 373 Thousands/uL    nRBC 0 /100 WBCs    Neutrophils Relative 56 43 - 75 %    Immat GRANS % 0 0 - 2 %    Lymphocytes Relative 35 14 - 44 %    Monocytes Relative 7 4 - 12 %    Eosinophils Relative 1 0 - 6 %    Basophils Relative 1 0 - 1 %    Neutrophils Absolute 4 21 1 85 - 7 62 Thousands/µL    Immature Grans Absolute 0 02 0 00 - 0 20 Thousand/uL    Lymphocytes Absolute 2 67 0 60 - 4 47 Thousands/µL    Monocytes Absolute 0 50 0 17 - 1 22 Thousand/µL    Eosinophils Absolute 0 07 0 00 - 0 61 Thousand/µL    Basophils Absolute 0 07 0 00 - 0 10 Thousands/µL   Comprehensive metabolic panel    Collection Time: 12/31/22  7:22 AM   Result Value Ref Range    Sodium 138 135 - 147 mmol/L    Potassium 3 8 3 5 - 5 3 mmol/L    Chloride 104 96 - 108 mmol/L    CO2 29 21 - 32 mmol/L    ANION GAP 5 4 - 13 mmol/L    BUN 15 5 - 25 mg/dL    Creatinine 0 65 0 60 - 1 30 mg/dL    Glucose, Fasting 86 65 - 99 mg/dL    Calcium 8 6 8 3 - 10 1 mg/dL    Corrected Calcium 9 2 8 3 - 10 1 mg/dL    AST 12 5 - 45 U/L    ALT 19 12 - 78 U/L    Alkaline Phosphatase 39 (L) 46 - 116 U/L    Total Protein 7 1 6 4 - 8 4 g/dL    Albumin 3 3 (L) 3 5 - 5 0 g/dL    Total Bilirubin 0 97 0 20 - 1 00 mg/dL    eGFR 122 ml/min/1 73sq m   Lipid Panel with Direct LDL reflex    Collection Time: 12/31/22  7:22 AM   Result Value Ref Range    Cholesterol 144 See Comment mg/dL    Triglycerides 59 See Comment mg/dL    HDL, Direct 53 >=50 mg/dL    LDL Calculated 79 0 - 100 mg/dL   POCT rapid strepA    Collection Time: 01/28/23  1:56 PM Result Value Ref Range     RAPID STREP A Negative Negative       The following portions of the patient's history were reviewed and updated as appropriate: allergies, current medications, past family history, past medical history, past social history, past surgical history and problem list      Review of Systems   Constitutional: Negative for activity change, appetite change, chills, diaphoresis, fatigue, fever and unexpected weight change  HENT: Positive for congestion and rhinorrhea  Negative for drooling, ear discharge, ear pain, facial swelling, hearing loss, postnasal drip, sinus pressure, sinus pain, sneezing, sore throat, tinnitus, trouble swallowing and voice change  Eyes: Negative for discharge  Respiratory: Negative for apnea, cough, choking, chest tightness, shortness of breath, wheezing and stridor  Cardiovascular: Negative for chest pain, palpitations and leg swelling  Gastrointestinal: Negative for abdominal distention, abdominal pain, anal bleeding, blood in stool, constipation, diarrhea, nausea and vomiting  Genitourinary: Negative for decreased urine volume, difficulty urinating, frequency and urgency  Musculoskeletal: Negative for arthralgias, back pain and myalgias  Skin: Negative for color change  Neurological: Negative for dizziness, light-headedness, numbness and headaches  Objective:      Vitals:    02/08/23 1114   BP: 110/80   Pulse: 74   Temp: 99 2 °F (37 3 °C)   SpO2: 98%          Physical Exam  Vitals reviewed  Constitutional:       General: She is not in acute distress  Appearance: Normal appearance  She is not ill-appearing, toxic-appearing or diaphoretic  HENT:      Mouth/Throat:      Mouth: Mucous membranes are moist       Pharynx: Posterior oropharyngeal erythema present  No pharyngeal swelling or oropharyngeal exudate  Cardiovascular:      Rate and Rhythm: Normal rate and regular rhythm  Pulses: Normal pulses        Heart sounds: Normal heart sounds  No murmur heard  No friction rub  No gallop  Pulmonary:      Effort: Pulmonary effort is normal  No respiratory distress  Breath sounds: Normal breath sounds  No stridor  No wheezing, rhonchi or rales  Chest:      Chest wall: No tenderness  Musculoskeletal:      Right lower leg: No edema  Left lower leg: No edema  Skin:     General: Skin is warm and dry  Findings: No lesion or rash  Neurological:      Mental Status: She is alert  0 = swallows foods/liquids without difficulty

## 2023-03-08 NOTE — PROGRESS NOTES
Weight Management Medical Nutrition Assessment  Vishal Jefferson presented for a meal planning session  Today's weight is 134  5#  Recently had gallbladder surgery in 2017  As a result lost weight and is interested in weight maintenance and gaining muscle  She is interested in discussing macros  Per dietary recall she reports she does not always have time to eat at work and is unsure of portion sizes  Reviewed snack ideas  Reviewed portion sizes  Discussed calorie, protein, and carbohydrate intake during meals and snacks  Hydration inadequate  Encourage water intake to meet hydration goals  We developed and reviewed a low calorie meal plan       Patient seen by Medical Provider in past 6 months:  no  Requested to schedule appointment with Medical Provider: No      Anthropometric Measurements  Start Weight (#): 134 5#  Current Weight (#): 134 5#  TBW % Change from start weight: n/a  Ideal Body Weight (#): 115#  Goal Weight (#): interested in maintaining weight  Highest:  Lowest:    Weight Loss History  Previous weight loss attempts: Exercise  Self Created Diets (Portion Control, Healthy Food Choices, etc )    Food and Nutrition Related History  Wake up: 5-5:15AM   Bed Time:    Food Recall  Breakfast: 1 cup coffee with 1-2 Tbsp Nigerian vanilla creamer, Kashi protein waffles OR [place] waffles with 1 banana OR 1/2 cup oats with fruit or scoop of plant protein powder OR Kashi Go cereal OR white english muffin, 1 tsp butter, shredded cheese, egg  Snack: handful of goldfish OR Pure protein bar  Lunch: Heart Smart can of soup and 5 carrots with small serving of blueberries OR Activia or [employer] [place] yogurt OR 1 string cheese, 1 mini cucumber, 5 tomatoes, 1 Tbsp balsamic glaze, spinach, sometimes chickpeas and turkey or chicken breast  Snack: Pure protein bar or skip  Dinner: 1/2 cup starch (potatoes, rice), 1/2-1 cup vegetable (broccoli, asparagus, etc ), 6 oz protein (pork loin, salmon, tilapia, chicken)   Snack: Smart Food white cheddar popcorn OR ice cream OR cookie OR icee      Beverages: 2 cups coffee, 32oz water  Volume of beverage intake: 50-60oz    Weekends: Same (will dine out)  Cravings: sweet, salty cravings   Trouble area of day: evening, during menstrual cycle    Frequency of Eating out: 1-2x/week (usually weekends)  Food restrictions: no allergies reported, does not eat a lot of red meat  Cooking: self   Food Shopping: self    Physical Activity Intake  Activity: recently completed a 35 day challenge (aerobic exercise, upper/lower body, yoga) but is not currently exercising   Frequency: none currently   Physical limitations/barriers to exercise: none reported    Estimated Needs  Energy  [employer] Energy Needs:  BMR : 1,298   1-2# loss weekly sedentary: [id number]             1-2# loss weekly lightly active: 785-1285  Maintenance calories for sedentary activity level: 1558  Protein: 63-78g     (1 2-1 5g/kg IBW)  Fluid: 65oz    (35mL/kg IBW)    Nutrition Diagnosis  Yes;    Food and nutrition related knowledge deficit  related to Lack of prior exposure to accurate nutrition related information as evidenced by Relates concerns about previous attempts to learn information       Nutrition Intervention    Nutrition Prescription  Calories: 1,600 calories  Protein: 63-78g  Fluid: 65oz    Meal Plan (Fan/Pro/Carb)  Breakfast: 300/20/30-45  Snack: 100-150/5-10/15-20  Lunch: 501/32-24/59  Snack: 100-150/5-10/15-20  Dinner: 389/48-08/82  Snack: 100-150/5-10/15-20    Nutrition Education:    Calorie controlled menu  Lean protein food choices  Healthy snack options  Food journaling tips      Nutrition Counseling:  Strategies: meal planning, portion sizes, healthy snack choices, hydration, fiber intake, protein intake, exercise, food journal      Monitoring and Evaluation:  Evaluation criteria:  Energy Intake  Meet protein needs  Maintain adequate hydration  Monitor weekly weight  Meal planning/preparation  Food journal   Decreased portions at mealtimes and snacks  Physical activity     Barriers to learning:none  Readiness to change: Action:  (Changing behavior)  Comprehension: very good  Expected Compliance: very good

## 2023-03-13 ENCOUNTER — ANNUAL EXAM (OUTPATIENT)
Dept: OBGYN CLINIC | Facility: CLINIC | Age: 28
End: 2023-03-13

## 2023-03-13 VITALS
SYSTOLIC BLOOD PRESSURE: 92 MMHG | WEIGHT: 139.2 LBS | HEIGHT: 64 IN | BODY MASS INDEX: 23.76 KG/M2 | DIASTOLIC BLOOD PRESSURE: 56 MMHG

## 2023-03-13 DIAGNOSIS — Z01.419 ROUTINE GYNECOLOGICAL EXAMINATION: Primary | ICD-10-CM

## 2023-03-13 NOTE — PROGRESS NOTES
Assessment   33 y o  Berkley Porter presenting for annual exam      Plan   Diagnoses and all orders for this visit:    Routine gynecological examination  Normal findings on routine exam   Encouraged 150 min of exercise per week  Reviewed balanced diet  Multivitamin encouraged   Breast awareness/SBE encouraged         Pap due   Mammo - baseline due at 37    RTO one year for yearly exam or sooner as needed  __________________________________________________________________    Subjective     Dami Ashby is a 33 y o  Berkley Porter presenting for annual exam  She is without complaint and does not want STD testing today  Hx of central retinal vein occlusion on Sprintec  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  She is currently happy with condom use and declines alternate contraception method  Has been with her boyfriend x 10 5 years  SCREENING  Last Pap: 2022 NILM  Last Mammo: n/a  Last Colonoscopy: n/a    GYN  Menarche at age 16  Periods are regular, monthly, lasting 5-7 days  Dysmenorrhea:mild, occurring premenstrually and first 1-2 days of flow  Finds manageable, not requiring tx  Cyclic symptoms include occasional breast tenderness, moodiness, bloating  Sexually active: Yes - single partner - male (together x >10 years)  Contraception: Condoms - consistently  Hx STI: denies     Hx Abnormal pap: denies  We reviewed ASCCP guidelines for Pap testing today  Gardasil: She has completed the Gardasil series  Denies vaginal discharge, itching, odor, dyspareunia, pelvic pain and vulvar/vaginal symptoms      OB     Desires future fertility - maybe in the next 2 years  Reviewed importance of preconception consult with hematology and MFM given hx of Antithrombin III deficiency    Complaints: denies   Denies urgency, frequency, hematuria, leakage / change in stream, difficulty urinating         BREAST  Complaints: denies   Denies: breast lump, breast tenderness, nipple discharge, skin color change, and skin lesion(s)  Personal hx: hx of left breast nodule - neg imaging  Pertinent Family Hx:   Family hx of breast cancer: no  Family hx of ovarian cancer: no  Family hx of colon cancer: no      GENERAL  PMH reviewed/updated and is as below  Patient does follow with a PCP      Past Medical History:   Diagnosis Date   • Acute sinusitis 2/14/2019   • Acute suppurative otitis media of right ear without spontaneous rupture of tympanic membrane 2/15/2019   • Asthma     stable on albuterol inhaler   • Central retinal vein occlusion of left eye 2020   • Encntr for gyn exam (general) (routine) w/o abn findings 2/18/2019   • Situational anxiety 11/13/2018   • Viral URI with cough 8/23/2018       Past Surgical History:   Procedure Laterality Date   • CHOLECYSTECTOMY  12/2017   • WISDOM TOOTH EXTRACTION           Current Outpatient Medications:   •  Calcium Carbonate (CALCIUM 600 PO), Take by mouth in the morning, Disp: , Rfl:   •  fluticasone (FLONASE) 50 mcg/act nasal spray, 1 spray into each nostril daily, Disp: 11 1 mL, Rfl: 0  •  folic acid (FOLVITE) 322 MCG tablet, Take 800 mcg by mouth daily, Disp: , Rfl:   •  Multiple Vitamin (MULTIVITAMIN) tablet, Take 1 tablet by mouth daily, Disp: , Rfl:   •  methocarbamol (ROBAXIN) 500 mg tablet, Take 1 tablet (500 mg total) by mouth daily at bedtime as needed for muscle spasms (Patient not taking: Reported on 12/13/2022), Disp: 30 tablet, Rfl: 0  •  pseudoephedrine (SUDAFED) 60 mg tablet, Take 1 tablet (60 mg total) by mouth every 8 (eight) hours as needed for congestion (Patient not taking: Reported on 1/28/2023), Disp: 30 tablet, Rfl: 0    Allergies   Allergen Reactions   • Sprintec 28 [Norgestimate-Eth Estradiol] Other (See Comments)     Blood clot in eye   • Sulfa Antibiotics Rash       Social History     Socioeconomic History   • Marital status: Single     Spouse name: Not on file   • Number of children: Not on file   • Years of education: Not on file   • Highest education level: Not on file   Occupational History   • Not on file   Tobacco Use   • Smoking status: Never   • Smokeless tobacco: Never   Vaping Use   • Vaping Use: Never used   Substance and Sexual Activity   • Alcohol use: Not Currently     Comment: social   • Drug use: No   • Sexual activity: Yes     Partners: Male     Birth control/protection: None     Comment: Currently not on OCP after 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 CRVO   Other Topics Concern   • Not on file   Social History Narrative   • Not on file     Social Determinants of Health     Financial Resource Strain: Not on file   Food Insecurity: Not on file   Transportation Needs: Not on file   Physical Activity: Not on file   Stress: Not on file   Social Connections: Not on file   Intimate Partner Violence: Not on file   Housing Stability: Not on file       Review of Systems     ROS:  Constitutional: Negative for fatigue and unexpected weight change  Respiratory: Negative for cough and shortness of breath  Cardiovascular: Negative for chest pain and palpitations  Gastrointestinal: Negative for abdominal pain and change in bowel habits  Breasts:  Negative, other than as noted above  Genitourinary: Negative, other than as noted above  Psychiatric: Negative for mood difficulties  Objective      BP 92/56 (BP Location: Left arm, Patient Position: Sitting, Cuff Size: Standard)   Ht 5' 3 75" (1 619 m)   Wt 63 1 kg (139 lb 3 2 oz)   LMP 02/15/2023   BMI 24 08 kg/m²     Physical Examination:    Patient appears well and is not in distress  Neck is supple without masses  Breasts are symmetrical without mass, tenderness, nipple discharge, skin changes or adenopathy  Abdomen is soft and nontender without masses  External genitals are normal without lesions or rashes  Urethral meatus and urethra are normal  Bladder is normal to palpation  Vagina is normal without discharge  + scant menstrual blood  Cervix is normal without discharge or lesion     Uterus is normal, mobile, nontender without palpable mass  Adnexa are normal, nontender, without palpable mass

## 2023-03-17 ENCOUNTER — OFFICE VISIT (OUTPATIENT)
Dept: BARIATRICS | Facility: CLINIC | Age: 28
End: 2023-03-17

## 2023-03-17 VITALS — BODY MASS INDEX: 23.83 KG/M2 | WEIGHT: 134.5 LBS | HEIGHT: 63 IN

## 2023-07-10 ENCOUNTER — OFFICE VISIT (OUTPATIENT)
Dept: OBGYN CLINIC | Facility: CLINIC | Age: 28
End: 2023-07-10
Payer: COMMERCIAL

## 2023-07-10 VITALS — SYSTOLIC BLOOD PRESSURE: 100 MMHG | DIASTOLIC BLOOD PRESSURE: 70 MMHG | WEIGHT: 139.4 LBS | BODY MASS INDEX: 24.69 KG/M2

## 2023-07-10 DIAGNOSIS — Z31.89 ENCOUNTER FOR FERTILITY PLANNING: Primary | ICD-10-CM

## 2023-07-10 DIAGNOSIS — D68.59 ANTITHROMBIN III DEFICIENCY (HCC): ICD-10-CM

## 2023-07-10 DIAGNOSIS — Z15.89 HETEROZYGOUS FOR MTHFR GENE MUTATION: ICD-10-CM

## 2023-07-10 DIAGNOSIS — H34.8192 CENTRAL RETINAL VEIN OCCLUSION, UNSPECIFIED COMPLICATION STATUS, UNSPECIFIED LATERALITY: ICD-10-CM

## 2023-07-10 DIAGNOSIS — Z13.71 ENCNTR FOR NONPROCREAT SCREEN FOR GENETIC DIS CARRIER STATUS: ICD-10-CM

## 2023-07-10 PROCEDURE — 99214 OFFICE O/P EST MOD 30 MIN: CPT | Performed by: CLINICAL NURSE SPECIALIST

## 2023-07-10 NOTE — ASSESSMENT & PLAN NOTE
Pre conception counseling was performed for patient. PMH/SH reviewed for risk factors. - see other A&P  She reports prios hx of MMR, Hep B and either varicella vaccine or chicken pox virus. Hep B x 2 only- ab testing ordered. Discussed preconception genetic screening for CF and SMA- handout and codes to check insurance given. Will call back if interested. She was advised to take prenatal vitamin daily/or folic acid supplement at least 800 mcg daily. Discussed with patient healthy lifestyle, exercise, maintenance of healthy weight. She was advised to avoid smoking, alcohol, drugs and medications that can be harmful to pregnancy. She was advised to limit caffeine intake to about 200 mg per day. Also discussed with patient optimization of natural fertility including timed intercourse, coital frequency and use of ovulation tests.

## 2023-07-10 NOTE — PROGRESS NOTES
Assessment/Plan:       1. Encounter for fertility planning  Assessment & Plan:  Pre conception counseling was performed for patient. PMH/SH reviewed for risk factors. - see other A&P  She reports prios hx of MMR, Hep B and either varicella vaccine or chicken pox virus. Hep B x 2 only- ab testing ordered. Discussed preconception genetic screening for CF and SMA- handout and codes to check insurance given. Will call back if interested. She was advised to take prenatal vitamin daily/or folic acid supplement at least 800 mcg daily. Discussed with patient healthy lifestyle, exercise, maintenance of healthy weight. She was advised to avoid smoking, alcohol, drugs and medications that can be harmful to pregnancy. She was advised to limit caffeine intake to about 200 mg per day. Also discussed with patient optimization of natural fertility including timed intercourse, coital frequency and use of ovulation tests. Orders:  -     Hepatitis B surface antigen; Future  -     Hepatitis B surface antibody; Future    2. Antithrombin III deficiency (HCC)  Assessment & Plan:  Pt with hx of retinal vein clot and found to have Antithrombin III deficiency. Pt planning pregnancy in the next 6 months or so. Discussed increased risk for VTE during pregnancy and will likely be placed on Lovenox for prevention. Offered preconception MFM consult requested. Orders:  -     Ambulatory Referral to Maternal Fetal Medicine; Future; Expected date: 07/11/2023    3. Central retinal vein occlusion, unspecified complication status, unspecified laterality  -     Ambulatory Referral to Maternal Fetal Medicine; Future; Expected date: 07/11/2023    4. Heterozygous for MTHFR gene mutation  Assessment & Plan:  Stressed importance of PNV with 116 mcg folic acid in it to prevent ONTD. To start prior to attempting conception/pregnancy     Orders:  -     Ambulatory Referral to Maternal Fetal Medicine; Future; Expected date: 07/11/2023    5. Encntr for nonprocreat screen for genetic dis carrier status  -     Cystic fibrosis gene test; Future  -     Spinal muscular atrophy DNA; Future          Subjective:      Patient ID: Marvin Pedersen is a 33 y.o. female. She is here for fertility consult (Patient here for fertility consult./She has questions regarding folic acid & birth control. /LMP: 6/15/23; periods are regular and last 5-7 days)    HPI  Pt considering pregnancy  Has not been actively trying yet. Planning to start in the next 6 months    Marital status:  for 2 years. Together for 11 years total.  Pregnancies with current partner: no.      Menstrual History:  Patient's last menstrual period was 06/15/2023.   Menarche Age: 16 years  Period Cycle (Days): 28  Period Duration (Days): 5-7d  Period Pattern: Regular  Menstrual Flow: Moderate  Menstrual Control: Maxi pad, Tampon  Dysmenorrhea: (!) Mild  Dysmenorrhea Symptoms: Cramping    OB History        0    Para   0    Term   0       0    AB   0    Living   0       SAB   0    IAB   0    Ectopic   0    Multiple   0    Live Births   0                    Patient's last menstrual period was 06/15/2023.     _______      Intermenstrual bleeding no   Postcoital bleeding no   Dysmenorrhea mild cramping   Amenorrhea not applicable   Weight change no   Hirsutism no   Balding no   Acne no   Galactorrhea no     Obstetrical History  Never pregnant    Gynecologic History  Last PAP UTD   Previous abdominal or pelvic surgery no   Pelvic pain no   Endometriosis no   Hot flashes no   DUONG exposure no   Abnormal Pap no   Cervix Cryo/cone no   Sexually transmitted diseases no   Pelvic inflammatory disease no     Sexual History  sexarche- age 16  Frequency N/a- not trying yet   Satisfied yes   Dyspareunia no   Use of lubricant no   Douching no         Contraception  None    Family History  Thyroid problems  no   Heart condition or high blood pressure  no   Blood clot or stroke  yes   Diabetes  yes   Cancer  no Birth defects/inherited diseases Yes- partner with small heart defect, no surgery   Infectious diseases (mumps, TB, rubella)  no   Other medical problems  no     Habits  Cigarettes: Wife -  no      - no  Alcohol:     Wife -  just occasionally      - just occasional  use  Marijuana: Wife - no     - no    Partner with low testerone- and on injectable supplementation    The following portions of the patient's history were reviewed and updated as appropriate: allergies, current medications, past family history, past medical history, past social history, past surgical history, and problem list.    Review of Systems  See HPI for pertinent positives          Objective:    /70 (BP Location: Left arm, Patient Position: Sitting, Cuff Size: Standard)   Wt 63.2 kg (139 lb 6.4 oz)   LMP 06/15/2023   BMI 24.69 kg/m²      Physical Exam  Constitutional:       General: She is not in acute distress. Appearance: Normal appearance. Genitourinary:      Genitourinary Comments: Pelvic exam deferred     Cardiovascular:      Rate and Rhythm: Normal rate. Pulmonary:      Effort: Pulmonary effort is normal.   Abdominal:      General: There is no distension. Palpations: Abdomen is soft. Musculoskeletal:         General: Normal range of motion. Neurological:      Mental Status: She is alert and oriented to person, place, and time. Skin:     General: Skin is warm and dry.    Psychiatric:         Mood and Affect: Mood normal.         Behavior: Behavior normal.

## 2023-07-10 NOTE — ASSESSMENT & PLAN NOTE
Stressed importance of PNV with 341 mcg folic acid in it to prevent ONTD.  To start prior to attempting conception/pregnancy

## 2023-07-10 NOTE — ASSESSMENT & PLAN NOTE
Pt with hx of retinal vein clot and found to have Antithrombin III deficiency. Pt planning pregnancy in the next 6 months or so. Discussed increased risk for VTE during pregnancy and will likely be placed on Lovenox for prevention. Offered preconception MFM consult requested.

## 2023-07-10 NOTE — PATIENT INSTRUCTIONS
Preconception instructions  Start tracking menstrual cycle to determine ovulation/"fertile window" (usually around 2 wks before start of period). Have sex every other day around this time frame. An ovum or egg can be fertilized by sperm for about 24 hours after its been released(ovulated). Sperm can survive for up to 72 hours. So you must have sex the day before, the day of, or even possibly the day after ovulation in order to get pregnant. For example- If you have a normal 28 day cycle. You would ovulate around day 14. Thus you would want to plan to have sex -every other day- from day 11- day 18. Please start taking a daily Multivitamin (with folic acid), prenatal vitamin, OR folic acid supplement (Should contain at least 400-800 mcg)  We discussed genetic screening for cystic fibrosis and Spinal Muscular atrophy. See handout and codes to check Insurance coverage. Please call back if interested in testing. Continue to strive towards a healthy lifestyle, including a well balanced diet and exercise. Please avoid smoking, alcohol, drugs and medications that can be harmful to pregnancy  Limit caffeine intake to about 200 mg per day. Check Hep B immunity through bloodwork  Consider carrier screening spinal muscular atrophy and cystic fibrosis. See below for codes     GUIDE TO GENETIC TESTING    Aneuploidy Testing  Trisomy 21 (Down Syndrome), Trisomy 18, and Open Neural Tube Defects (Spina Bifida). You may choose one of the following options: See below for CPT/Diagnostic codes  NIPT (Non-Invasive Prenatal Testing or Cell Free- Fetal DNA): This simple and accurate non-invasive prenatal screening blood test offers results for early risk assessment of Down syndrome (Trisomy 21), or Trisomy 25, trisomy 15 and other aneuploidy conditions. The test also offers an optional analysis for fetal sex.   The test analyzes the relative amount of 21, 18, 13; X and Y chromosome material in circulating cell-free DNA from a maternal blood sample. This test can be performed at any time after 10 weeks gestation. If you elect this test, you will also have an AFP (alpha-fetoprotein) blood test to test for open neural tube defects. Recommended follow up to a positive result is genetic counseling and prenatal diagnosis. Sequential Screening with Nuchal Translucency: This is a two-step test to detect whether a fetus is at increased risk for trisomy 24, trisomy 25, trisomy 15 and open neural tube defects. The test has a narrow window for testing (the first step must be performed between 10 and 13 weeks gestation). It includes two blood draws and an ultrasound. The ultrasound measures the amount of fluid behind the baby’s neck called the nuchal translucency (NT). The blood tests measures three different maternal hormone levels, -- pregnancy associated plasma protein (MURIEL-A), human chorionic gonadotrophin (hCG), and dimeric inhibin A (CARLIN). These measurements in combination with some maternal information such as height and weight are used to calculate whether the baby is at increased risk for Down Syndrome or Trisomy 18. An alpha-fetoprotein test (AFP) is also included to test for open neural tube defects. Recommended follow up to a positive result is additional testing that is more definitive, and referral for genetic counseling and prenatal diagnosis. Quad Screen: This test is also known as the quadruple marker test.  It is a test that measures levels of four substances in a pregnant woman’s blood--Alpha-fetoprotein (AFP), a protein made by the developing baby; Human chorionic gonadotropin (hCG), a hormone made by the placenta; Estriol, a hormone made by the placenta and the baby’s liver; and Inhibin A, another hormone made by the placenta.   Typically, the quad screen is done between weeks 15 and 20 of pregnancy--the second trimester and the results indicate whether the baby is at higher risk for Down Syndrome (Trisomy 21), Trisomy 18, and open neural tube defects. This is a screening test.  Recommended follow up to a positive result is additional testing that is more definitive, as well as referral for genetic counseling and prenatal diagnosis. Trisomy 21 Trisomy 18 Trisomy 13   NIPT  (FPR 0.1%) ~99% ~98% 99%   Sequential Screening (FPR 3.5%) 92% 90% N/A   Quad Screen   (FPR 5%) 83% 80% N/A   (FPR is False Positive Rate)       Additional Screening Tests Offered  Cystic Fibrosis: Cystic Fibrosis is the most common inherited disease of children and young adults. The carrier frequency is 1 in 24, to 1 in 97. Both parents need to be carriers for a child to be affected (25% chance). One in 200, children born are affected. Cystic fibrosis is a disorder of mucus production and produces abnormally thick mucus leading to life threatening lung infections, digestion problems, poor growth, infertility, and more. Symptoms range from mild to severe, but individuals with severe disease may die in childhood. With treatments today, people with Cystic Fibrosis can live into their 30’s. CF does not affect intelligence. Recommended follow up to a positive result is testing of the baby’s father. Spinal Muscular Atrophy (SMA): SMA is the most common inherited cause of early childhood death. The carrier frequency is 1 in 52 to 1 in 67 in the [place] and both parents need to be carriers for a child to be affected (25% chance). 1 in 11,000 children are affected. SMA is a progressive degeneration of lower motor neurons. Muscle weakness is the most common type with respiratory failure by the age of 3years old. Muscles responsible for crawling, walking, swallowing, and head and neck control are most severely affected. Recommended follow up to a positive result is testing of the baby’s father.       Fragile X Syndrome (the most common inherited cause of developmental delays): Fragile X syndrome is an “X-linked” genetic disease, which means it is only carried by the mom. Unfortunately, 1 in 250 females are carriers and a child has a 50% chance of being affected if this is the case. 1 in 4000 boys is affected with Fragile X and 1 in 8000 girls is affected. Approximately 1/3 of all children born with Fragile X also have autism and hyperactivity. Recommended follow up to a positive result is genetic counseling and prenatal diagnosis. Guide To Insurance Coverage For Genetic Screening  Due to the complexity of coverage guidelines, we are unable to quote benefits or guarantee insurance coverage for any of these tests. Insurance benefits are plan-specific and offer vastly different coverage based on your policy. The handout attached explains the benefits to each test, and also provides the billing (CPT) codes for each test.  Even if the testing is covered, it could be applied to any unmet deductibles, and copays may apply, resulting in a bill. You are encouraged to contact your insurance company to obtain your benefits based on your age and risk factors, so that there are no surprises. Test Insurance Procedure (CPT) code   NIPT-cell free fetal DNA Most accurate non-invasive test- not always covered w/o risk factors 63664   Sequential Screen w/ NT US Current standard test-should be covered Part 1: (if lab is [facility]) [id number]   (If lab is [facility]) [address]  Part 2: (if lab is [id number])63659,[id number], [address]2368  (If lab is [facility]) 36280   Quad screen Old standard-use if past 1st trimester [address], [address] [place] [address], [address], [address]   CF- Cystic Fibrosis  36165   SMA- Spinal Musc.  Atrophy  29679   Fragile X  [id number]       Diagnosis code used Varies based on history- But will be one of these:  Encounter for  screening for other genetic defect Z36.8  Advanced Maternal Age (over 28) O12.46  Family History of Genetic Disease Carrier Z84.81    Please contact your insurance company with the appropriate CPT code from the attached list, and diagnosis code applicable to your situation. We ask that you review this information and decide what testing you would like to have performed. Please note that the Sequential Screening with Nuchal Translucency has a smaller window of time to be performed during pregnancy (Prior to 14 wks).

## 2023-08-05 ENCOUNTER — APPOINTMENT (OUTPATIENT)
Dept: LAB | Facility: IMAGING CENTER | Age: 28
End: 2023-08-05
Payer: COMMERCIAL

## 2023-08-05 DIAGNOSIS — Z31.89 ENCOUNTER FOR FERTILITY PLANNING: ICD-10-CM

## 2023-08-05 PROCEDURE — 87340 HEPATITIS B SURFACE AG IA: CPT

## 2023-08-05 PROCEDURE — 86706 HEP B SURFACE ANTIBODY: CPT

## 2023-08-05 PROCEDURE — 36415 COLL VENOUS BLD VENIPUNCTURE: CPT

## 2023-08-06 LAB
HBV SURFACE AB SER-ACNC: 49.2 MIU/ML
HBV SURFACE AG SER QL: NORMAL

## 2023-08-10 ENCOUNTER — ULTRASOUND (OUTPATIENT)
Dept: OBGYN CLINIC | Facility: CLINIC | Age: 28
End: 2023-08-10

## 2023-08-10 VITALS — WEIGHT: 138.2 LBS | BODY MASS INDEX: 24.48 KG/M2 | DIASTOLIC BLOOD PRESSURE: 78 MMHG | SYSTOLIC BLOOD PRESSURE: 104 MMHG

## 2023-08-10 DIAGNOSIS — Z15.89 HETEROZYGOUS FOR MTHFR GENE MUTATION: ICD-10-CM

## 2023-08-10 DIAGNOSIS — D68.59 ANTITHROMBIN III DEFICIENCY (HCC): ICD-10-CM

## 2023-08-10 DIAGNOSIS — N91.1 SECONDARY AMENORRHEA: Primary | ICD-10-CM

## 2023-08-10 RX ORDER — RIBOFLAVIN (VITAMIN B2) 100 MG
125 TABLET ORAL DAILY
COMMUNITY
End: 2023-08-10

## 2023-08-10 NOTE — PROGRESS NOTES
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 She is taking additional folate. - Genetic screening options reviewed. She is interested in NIPT, so MFM referral placed  - Prenatal care reviewed  - Pregnancy precautions reviewed  - Prenatal Vitamin recommended. RTO for OB interview and PN-1 visit    SUBJECTIVE:    Hiren Chowdary is a 33 y.o.  presenting today for verification of pregnancy. Patient's last menstrual period was 06/15/2023. Menses are regular. This pregnancy was planned    She is accompanied by her  Pool Teran. Reports breast tenderness, insomnia, nausea, and vomiting x 1. 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 She is taking additional folate. Taking a prenatal vitamin. The following portions of the patient's history were reviewed and updated as appropriate: allergies, current medications, past family history, past medical history, past social history, past surgical history, and problem list.    OBJECTIVE:    /78   Wt 62.7 kg (138 lb 3.2 oz)   LMP 06/15/2023   BMI 24.48 kg/m²     FINDINGS:  See imaging report for details    Single intrauterine pregnancy of 6w4d gestational age.      Additional Findings: none     FHR: 185

## 2023-08-10 NOTE — PROGRESS NOTES
Patient here for early [address]   LMP: 6/15/23  8w/0d  DANDY: 3/21/24    Planned: yes  Periods: regular  Symptoms: breast tenderness, nausea, insomnia, had an episode of hives on her legs and vomited when she brushed her teeth last week  She is accompanied by: Dru Jose

## 2023-08-16 ENCOUNTER — TELEPHONE (OUTPATIENT)
Dept: INTERNAL MEDICINE CLINIC | Facility: OTHER | Age: 28
End: 2023-08-16

## 2023-08-16 NOTE — TELEPHONE ENCOUNTER
Patient has a physical scheduled for 10/18/2023 with . Patient would like to know if it is necessary to keep this appointment as she is pregnant and has been following her obgyn throughout her pregnancy.

## 2023-08-24 ENCOUNTER — TELEPHONE (OUTPATIENT)
Dept: HEMATOLOGY ONCOLOGY | Facility: CLINIC | Age: 28
End: 2023-08-24

## 2023-08-24 ENCOUNTER — INITIAL PRENATAL (OUTPATIENT)
Dept: OBGYN CLINIC | Facility: CLINIC | Age: 28
End: 2023-08-24

## 2023-08-24 VITALS — HEIGHT: 63 IN | WEIGHT: 138 LBS | BODY MASS INDEX: 24.45 KG/M2

## 2023-08-24 DIAGNOSIS — Z34.01 ENCOUNTER FOR SUPERVISION OF NORMAL FIRST PREGNANCY IN FIRST TRIMESTER: Primary | ICD-10-CM

## 2023-08-24 PROCEDURE — OBC

## 2023-08-24 NOTE — PATIENT INSTRUCTIONS
Congratulations!! Please review our Pregnancy Essential Guide and [facility] L&D Virtual tour from our [employer]. . [place] Pregnancy Essentials Guide  [facility] Women[facility] ([facility])     [id number] [facility] ([employer])

## 2023-08-24 NOTE — TELEPHONE ENCOUNTER
Appointment Schedule   Who are you speaking with? self   If it is not the patient, are they listed on an active communication consent form? self   Which provider is the appointment scheduled with? Monse   At which location is the appointment scheduled for? [facility]   When is the appointment scheduled? Please list date and time 8/31 3pm   What is the reason for this appointment? Pregnant, follow up   Did patient voice understanding of the details of this appointment? yes   Was the no show policy reviewed with patient?  yes

## 2023-08-24 NOTE — PROGRESS NOTES
OB INTAKE INTERVIEW  Patient is 28 y.o.y.o. who presents for OB intake at 10wks  She is accompanied by herself during this phone encounter  The father of her baby Antonio Cali is involved in the pregnancy and is 32years old.   Last Menstrual Period: 6/15/2023  Ultrasound: Measured 8 weeks 1 days on 8/10/2023  Estimated Date of Delivery: 3/21/2024 confirmed by  8 week US    Signs/Symptoms of Pregnancy  Current pregnancy symptoms: nausea, fatigue starting to resolve  Constipation no  Headaches no  Cramping/spotting YES, very minor lower back aches  PICA cravings no    Diabetes-  Body mass index is 24.45 kg/m². If patient has 1 or more, please order early 1 hour GTT  History of GDM no  BMI >35 no  History of PCOS or current metformin use no  History of LGA/macrosomic infant (4000g/9lbs) no    If patient has 2 or more, please order early 1 hour GTT  BMI>30 no  AMA no  First degree relative with type 2 diabetes YES  History of chronic HTN, hyperlipidemia, elevated A1C no  High risk race (, , ,  or ) YES,     Hypertension- if you answer yes, please order preeclampsia labs (cbc, comprehensive metabolic panel, urine protein creatinine ratio, uric acid)  History of of chronic HTN no  History of gestational HTN no  History of preeclampsia, eclampsia, or HELLP syndrome no  History of diabetes no  History of lupus, autoimmune disease, kidney disease no    Thyroid- if yes order TSH with reflex T4  History of thyroid disease no    Bleeding Disorder or Hx of DVT-patient or first degree relative with history of. Order the following if not done previously. (Factor V, antithrombin III, prothrombin gene mutation, protein C and S Ag, lupus anticoagulant, anticardiolipin, beta-2 glycoprotein)   YES, patient has MTHFR gene, antithrombin III deficency. Saw hematology and was discharged, patient will reach out to let them know of her pregnancy.      OB/GYN-  History of abnormal pap smear no       Date of last pap smear 3/7/22  History of HPV no  History of Herpes/HSV no  History of other STI (gonorrhea, chlamydia, trich) no  History of prior  no  History of prior  no  History of  delivery prior to 36 weeks 6 days no  History of blood transfusion no  Ok for blood transfusion yes      Substance screening- if yes outside of tobacco for her or anyone in her home-order urine drug screen  History of tobacco use no  Currently using tobacco no  Currently using alcohol no  Presently using drugs no  Past drug use  no  IV drug use- no  Partner drug use no  Parent/Family drug use no    MRSA Screening-   Does the pt have a hx of MRSA? no  If yes- please follow MRSA protocol and obtain a nasal swab for MRSA culture    Immunizations:  Influenza vaccine given this season yes  Discussed Tdap vaccine yes  Discussed COVID Vaccine yes, moderna x3     Genetic/M-  Do you or your partner have a history of any of the following in yourselves or first degree relatives? Cystic fibrosis no  Spinal muscular atrophy no  Hemoglobinopathy/Sickle Cell/Thalassemia no  Fragile X Intellectual Disability no    If yes, discuss carrier screening and recommend consultation with [facility]/genetic counseling. If no, discuss option for carrier screening and/or genetic testing with Nuchal Ultrasound.  Patient interested yes  Appointment at [facility] made yes, appointment on     Interview education  [facility] Pregnancy Essentials Book reviewed, discussed and attached to their AVS yes    Nurse/Family Partnership- patient may qualify no; referral placed no    Prenatal lab work scripts yes  Extra labs ordered:  CF and SMA DNA   1hr glucose    Aspirin/Preeclampsia Screen    Risk Level Risk Factor Recommendation   LOW Prior Uncomplicated full-term delivery no No Aspirin recommendation        MODERATE Nulliparity YES Recommend low-dose aspirin if     BMI>30 no 2 or more moderate risk factors    Family History Preeclampsia (mother/sister) YES     35yr old or greater no      or Low Socioeconomic no     IVF Pregnancy  no     Personal History Risks (low birth weight, prior adverse preg outcome, >10yr preg interval) no         HIGH History of Preeclampsia no Recommend low-dose aspirin if     Multifetal gestation no 1 or more high risk factors    Chronic HTN no     Type 1 or 2 Diabetes no     Renal Disease no     Autoimmune Disease  no      Contraindications to ASA therapy:  NSAID/ ASA allergy: no  Nasal polyps: no  Asthma with history of ASA induced bronchospasm: no  Relative contraindications:  History of GI bleed: no  Active peptic ulcer disease: no  Severe hepatic dysfunction: no    Patient should be recommended to take ASA 162mg during this pregnancy from 12-36wks to lower her risk of preeclampsia: Meets moderate risk factor for preE- will discuss with provider about ASA use with MTHFR gene and antithrombin III, MFM consult placed       The patient has a history now or in prior pregnancy notable for:  Antithrombin III deficiency, and heterozygous MTHFR gene mutation. Details that I feel the provider should be aware of: This is a planned pregnancy for Melody Leung and her  Cindy Valdes. She is a previous GYN patient of [address] [place] [address] and this is their first pregnancy! Melody Leung has a history of blood clots in her left eye with OCP and then saw hematology where she was diagnosed to have antithrombin III deficiency and heterozygous MTHFR gene mutation. She has been discharged from the Hematology department prior to pregnancy, she is going to reach out the them to see if she needs any appointments now that she is currently pregnant. MFM consult placed for anticoagulation review as well. She is at moderate risk for preE and ASA therapy is advised but due to her gene mutation she was advised to discuss this with the provider at her next appointment before starting at 12 weeks. PN1 visit scheduled.  The patient was oriented to our practice, reviewed delivering physicians and [facility] for Delivery. All questions were answered.     Interviewed by: Marcie Geronimo RN

## 2023-08-28 ENCOUNTER — APPOINTMENT (OUTPATIENT)
Dept: LAB | Facility: CLINIC | Age: 28
End: 2023-08-28
Payer: COMMERCIAL

## 2023-08-28 DIAGNOSIS — Z34.01 ENCOUNTER FOR SUPERVISION OF NORMAL FIRST PREGNANCY IN FIRST TRIMESTER: ICD-10-CM

## 2023-08-28 DIAGNOSIS — Z13.71 ENCNTR FOR NONPROCREAT SCREEN FOR GENETIC DIS CARRIER STATUS: ICD-10-CM

## 2023-08-28 LAB
ABO GROUP BLD: NORMAL
AMORPH URATE CRY URNS QL MICRO: ABNORMAL
BACTERIA UR QL AUTO: ABNORMAL /HPF
BASOPHILS # BLD AUTO: 0.06 THOUSANDS/ÂΜL (ref 0–0.1)
BASOPHILS NFR BLD AUTO: 1 % (ref 0–1)
BILIRUB UR QL STRIP: NEGATIVE
BLD GP AB SCN SERPL QL: NEGATIVE
CLARITY UR: ABNORMAL
COLOR UR: YELLOW
EOSINOPHIL # BLD AUTO: 0.05 THOUSAND/ÂΜL (ref 0–0.61)
EOSINOPHIL NFR BLD AUTO: 0 % (ref 0–6)
ERYTHROCYTE [DISTWIDTH] IN BLOOD BY AUTOMATED COUNT: 12.5 % (ref 11.6–15.1)
GLUCOSE 1H P 50 G GLC PO SERPL-MCNC: 92 MG/DL (ref 40–134)
GLUCOSE UR STRIP-MCNC: NEGATIVE MG/DL
HBV SURFACE AG SER QL: NORMAL
HCT VFR BLD AUTO: 42.1 % (ref 34.8–46.1)
HCV AB SER QL: NORMAL
HGB BLD-MCNC: 13.8 G/DL (ref 11.5–15.4)
HGB UR QL STRIP.AUTO: NEGATIVE
HIV 1+2 AB+HIV1 P24 AG SERPL QL IA: NORMAL
HIV 2 AB SERPL QL IA: NORMAL
HIV1 AB SERPL QL IA: NORMAL
HIV1 P24 AG SERPL QL IA: NORMAL
IMM GRANULOCYTES # BLD AUTO: 0.03 THOUSAND/UL (ref 0–0.2)
IMM GRANULOCYTES NFR BLD AUTO: 0 % (ref 0–2)
KETONES UR STRIP-MCNC: NEGATIVE MG/DL
LEUKOCYTE ESTERASE UR QL STRIP: NEGATIVE
LYMPHOCYTES # BLD AUTO: 2.17 THOUSANDS/ÂΜL (ref 0.6–4.47)
LYMPHOCYTES NFR BLD AUTO: 20 % (ref 14–44)
MCH RBC QN AUTO: 29.1 PG (ref 26.8–34.3)
MCHC RBC AUTO-ENTMCNC: 32.8 G/DL (ref 31.4–37.4)
MCV RBC AUTO: 89 FL (ref 82–98)
MONOCYTES # BLD AUTO: 0.55 THOUSAND/ÂΜL (ref 0.17–1.22)
MONOCYTES NFR BLD AUTO: 5 % (ref 4–12)
MUCOUS THREADS UR QL AUTO: ABNORMAL
NEUTROPHILS # BLD AUTO: 8.26 THOUSANDS/ÂΜL (ref 1.85–7.62)
NEUTS SEG NFR BLD AUTO: 74 % (ref 43–75)
NITRITE UR QL STRIP: NEGATIVE
NON-SQ EPI CELLS URNS QL MICRO: ABNORMAL /HPF
NRBC BLD AUTO-RTO: 0 /100 WBCS
PH UR STRIP.AUTO: 7 [PH]
PLATELET # BLD AUTO: 234 THOUSANDS/UL (ref 149–390)
PMV BLD AUTO: 11.4 FL (ref 8.9–12.7)
PROT UR STRIP-MCNC: NEGATIVE MG/DL
RBC # BLD AUTO: 4.75 MILLION/UL (ref 3.81–5.12)
RBC #/AREA URNS AUTO: ABNORMAL /HPF
RH BLD: POSITIVE
RUBV IGG SERPL IA-ACNC: 24.6 IU/ML
SP GR UR STRIP.AUTO: 1.01 (ref 1–1.03)
TREPONEMA PALLIDUM IGG+IGM AB [PRESENCE] IN SERUM OR PLASMA BY IMMUNOASSAY: NORMAL
UROBILINOGEN UR STRIP-ACNC: <2 MG/DL
WBC # BLD AUTO: 11.12 THOUSAND/UL (ref 4.31–10.16)
WBC #/AREA URNS AUTO: ABNORMAL /HPF

## 2023-08-28 PROCEDURE — 36415 COLL VENOUS BLD VENIPUNCTURE: CPT

## 2023-08-28 PROCEDURE — 87086 URINE CULTURE/COLONY COUNT: CPT

## 2023-08-28 PROCEDURE — 82950 GLUCOSE TEST: CPT

## 2023-08-28 PROCEDURE — 85025 COMPLETE CBC W/AUTO DIFF WBC: CPT

## 2023-08-28 PROCEDURE — 86803 HEPATITIS C AB TEST: CPT

## 2023-08-28 PROCEDURE — 86780 TREPONEMA PALLIDUM: CPT

## 2023-08-28 PROCEDURE — 81329 SMN1 GENE DOS/DELETION ALYS: CPT

## 2023-08-28 PROCEDURE — 87389 HIV-1 AG W/HIV-1&-2 AB AG IA: CPT

## 2023-08-28 PROCEDURE — 86762 RUBELLA ANTIBODY: CPT

## 2023-08-28 PROCEDURE — 86901 BLOOD TYPING SEROLOGIC RH(D): CPT

## 2023-08-28 PROCEDURE — 81220 CFTR GENE COM VARIANTS: CPT

## 2023-08-28 PROCEDURE — 87340 HEPATITIS B SURFACE AG IA: CPT

## 2023-08-28 PROCEDURE — 81001 URINALYSIS AUTO W/SCOPE: CPT

## 2023-08-28 PROCEDURE — 86850 RBC ANTIBODY SCREEN: CPT

## 2023-08-28 PROCEDURE — 86900 BLOOD TYPING SEROLOGIC ABO: CPT

## 2023-08-29 LAB — BACTERIA UR CULT: NORMAL

## 2023-08-31 ENCOUNTER — TELEPHONE (OUTPATIENT)
Dept: HEMATOLOGY ONCOLOGY | Facility: CLINIC | Age: 28
End: 2023-08-31

## 2023-08-31 ENCOUNTER — OFFICE VISIT (OUTPATIENT)
Dept: HEMATOLOGY ONCOLOGY | Facility: CLINIC | Age: 28
End: 2023-08-31
Payer: COMMERCIAL

## 2023-08-31 VITALS
RESPIRATION RATE: 17 BRPM | BODY MASS INDEX: 24.8 KG/M2 | HEART RATE: 78 BPM | TEMPERATURE: 97.8 F | OXYGEN SATURATION: 97 % | DIASTOLIC BLOOD PRESSURE: 74 MMHG | WEIGHT: 140 LBS | HEIGHT: 63 IN | SYSTOLIC BLOOD PRESSURE: 118 MMHG

## 2023-08-31 DIAGNOSIS — Z15.89 HETEROZYGOUS FOR MTHFR GENE MUTATION: Primary | ICD-10-CM

## 2023-08-31 DIAGNOSIS — H34.8192 CENTRAL RETINAL VEIN OCCLUSION, UNSPECIFIED COMPLICATION STATUS, UNSPECIFIED LATERALITY: ICD-10-CM

## 2023-08-31 PROBLEM — D68.59 ANTITHROMBIN III DEFICIENCY (HCC): Status: RESOLVED | Noted: 2020-09-15 | Resolved: 2023-08-31

## 2023-08-31 LAB
CITATION REF LAB TEST: NORMAL
CLINICAL INFO: NORMAL
ETHNIC BACKGROUND STATED: NORMAL
GENE DIS ANL CARRIER INTERP-IMP: NORMAL
GENE MUT TESTED BLD/T: NORMAL
LAB DIRECTOR NAME PROVIDER: NORMAL
REASON FOR REFERRAL (NARRATIVE): NORMAL
RECOMMENDATION PATIENT DOC-IMP: NORMAL
REF LAB TEST METHOD: NORMAL
SERVICE CMNT-IMP: NORMAL
SMN1 GENE MUT ANL BLD/T: NORMAL
SPECIMEN SOURCE: NORMAL

## 2023-08-31 PROCEDURE — 99214 OFFICE O/P EST MOD 30 MIN: CPT | Performed by: PHYSICIAN ASSISTANT

## 2023-08-31 NOTE — TELEPHONE ENCOUNTER
Left message for patient asking if patient would like to come in earlier to appointment, two cancellations occurred and Camilo Hallman has the availability, provided teams number for a call back.

## 2023-08-31 NOTE — PROGRESS NOTES
Hematology/Oncology Outpatient Follow-up  Kedar Pearson 33 y.o. female 1995 [id number]    Date:  8/31/2023      Assessment and Plan:  1. Heterozygous for MTHFR gene mutation, 2. Central retinal vein occlusion, unspecified complication status, unspecified laterality  50-year-old female presents for follow-up regarding history of central retinal venous occlusion for which she is undergoing treatment with Ophthalmology. She had a hypercoagulable workup which was unrevealing. 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 On repeat this was 91%. This is not consistent with deficiency. This was incorrectly labeled on her chart. I resolved this. Levels are attached below. The patient  Discussed repeat testing at this time if she wished but decided it was not needed. Spoke with Dr. Chayo Khan prior to the visit regarding recommendations. He would recommend patient to take 81 mg aspirin p.o. daily. Patient does also have follow-up visit with maternal-fetal medicine in approximately 3 weeks. If they have any further concerns/questions they can contact Dr. Chayo Khan directly. In regards to MTHFR there is nothing else needed besides folic acid. Follow-up as needed. HPI:  50-year-old female presents for consultation regarding abnormal thrombosis panel.     In Sept 2020 patient had visual changes and saw optometry and then urgent opthalmology.  She was diagnosed with central retinal vein occlusion for which she continues to follow with Ophthalmology and states that she is receiving injections, Eyela.     Thrombosis panel was negative except for antithrombin III activity 75%. This was repeated      She has been on OCP for the last 8 years. This has been d/c since central retinal vein occlusion.       Maternal grandmother stroke 48.   Paternal grandmother stroke      MTHFR mutation showed a single mutation, C677 T heterozygous. Her homocystine level was 7.2.   She is on folic acid 258 micro g daily. Factor 8 activity was 91%. CBC and CMP have remained normal.     She has been getting injections in the eye for her retinal venous thrombosis for which she has what she thinks is her last 1 scheduled next week. She has occasional headaches following the injections which she was told was normal from Ophthalmology. She also has occasional black dots in vision for which ophthalmology is also aware. Interval history:    ROS: Review of Systems   Constitutional: Negative for activity change, appetite change, chills, fatigue, fever and unexpected weight change. Eyes: Negative for photophobia, pain and visual disturbance. Respiratory: Negative for cough and shortness of breath. Cardiovascular: Negative for chest pain, palpitations and leg swelling. Gastrointestinal: Negative for abdominal pain, constipation, diarrhea, nausea and vomiting. Genitourinary: Negative for difficulty urinating, dysuria and hematuria. Musculoskeletal: Negative for arthralgias. Skin: Negative. Neurological: Negative for dizziness, weakness, light-headedness, numbness and headaches. Hematological: Negative. Psychiatric/Behavioral: Negative.         Past Medical History:   Diagnosis Date   • Acute sinusitis 02/14/2019   • Acute suppurative otitis media of right ear without spontaneous rupture of tympanic membrane 02/15/2019   • Asthma     stable on albuterol inhaler   • Central retinal vein occlusion of left eye 2020    vision spared, some spots   • Situational anxiety 11/13/2018    managed with no medications currently   • Viral URI with cough 08/23/2018       Past Surgical History:   Procedure Laterality Date   • CHOLECYSTECTOMY  12/2017   • WISDOM TOOTH EXTRACTION         Social History     Socioeconomic History   • Marital status: Single     Spouse name: None   • Number of children: None   • Years of education: None   • Highest education level: None   Occupational History   • None   Tobacco Use   • Smoking status: Never   • Smokeless tobacco: Never   Vaping Use   • Vaping Use: Never used   Substance and Sexual Activity   • Alcohol use: Not Currently   • Drug use: No   • Sexual activity: Yes     Partners: Male     Birth control/protection: None   Other Topics Concern   • None   Social History Narrative   • None     Social Determinants of Health     Financial Resource Strain: Not on file   Food Insecurity: Not on file   Transportation Needs: Not on file   Physical Activity: Not on file   Stress: Not on file   Social Connections: Not on file   Intimate Partner Violence: Not on file   Housing Stability: Not on file       Family History   Problem Relation Age of Onset   • Hypertension Mother    • Diabetes Mother    • Hyperlipidemia Mother    • SRUTHI disease Mother    • Hyperlipidemia Father    • Asthma Father    • COPD Father    • Stroke Maternal Grandmother    • Hypertension Maternal Grandmother    • Dementia Maternal Grandmother    • Prostate cancer Maternal Grandfather    • Lung cancer Maternal Grandfather    • Stroke Paternal Grandmother    • Diabetes Paternal Grandfather    • Heart disease Paternal Grandfather        Allergies   Allergen Reactions   • Sprintec 29 [Norgestimate-Eth Estradiol] Other (See Comments)     Blood clot in eye   • Sulfa Antibiotics Rash         Current Outpatient Medications:   •  Calcium Carbonate (CALCIUM 500 PO), Take 500 mg by mouth in the morning, Disp: , Rfl:   •  Ferrous Sulfate (Iron) 28 MG TABS, Take by mouth, Disp: , Rfl:   •  fluticasone (FLONASE) 50 mcg/act nasal spray, 1 spray into each nostril daily (Patient taking differently: 1 spray into each nostril as needed for allergies or rhinitis), Disp: 11.1 mL, Rfl: 0  •  folic acid (FOLVITE) 552 MCG tablet, Take by mouth daily, Disp: , Rfl:   •  Prenatal Vit-Fe Fumarate-FA (PRENATAL VITAMIN PO), Take by mouth, Disp: , Rfl:       Physical Exam:  /74 (BP Location: Left arm, Patient Position: Sitting, Cuff Size: Adult) Pulse 78   Temp 97.8 °F (36.6 °C)   Resp 17   Ht 5' 3" (1.6 m)   Wt 63.5 kg (140 lb)   LMP 06/15/2023   SpO2 97%   BMI 24.80 kg/m²     Physical Exam  Vitals reviewed. Constitutional:       General: She is not in acute distress. Appearance: She is well-developed. She is not ill-appearing. HENT:      Head: Normocephalic and atraumatic. Eyes:      General: No scleral icterus. Conjunctiva/sclera: Conjunctivae normal.   Cardiovascular:      Rate and Rhythm: Normal rate and regular rhythm. Heart sounds: Normal heart sounds. No murmur heard. Pulmonary:      Effort: Pulmonary effort is normal. No respiratory distress. Breath sounds: Normal breath sounds. Abdominal:      Palpations: Abdomen is soft. Tenderness: There is no abdominal tenderness. Musculoskeletal:         General: No tenderness. Normal range of motion. Cervical back: Normal range of motion and neck supple. Right lower leg: No edema. Left lower leg: No edema. Lymphadenopathy:      Cervical: No cervical adenopathy. Skin:     General: Skin is warm and dry. Neurological:      Mental Status: She is alert and oriented to person, place, and time. Cranial Nerves: No cranial nerve deficit. Psychiatric:         Mood and Affect: Mood normal.         Behavior: Behavior normal.       Labs:  Lab Results   Component Value Date    WBC 11.12 (H) 08/28/2023    HGB 13.8 08/28/2023    HCT 42.1 08/28/2023    MCV 89 08/28/2023     08/28/2023         I have spent 30 minutes with Patient  today in which greater than 50% of this time was spent in counseling/coordination of care regarding Diagnostic results, Risks and benefits of tx options, Instructions for management, Patient and family education, Impressions, Counseling / Coordination of care, Documenting in the medical record, Reviewing / ordering tests, medicine, procedures   and Obtaining or reviewing history  .      Patient voiced understanding and agreement in the above discussion. Aware to contact our office with questions/symptoms in the interim. This note has been generated by voice recognition software system. Therefore, there may be spelling, grammar, and or syntax errors. Please contact if questions arise.

## 2023-09-05 PROBLEM — Z3A.11 11 WEEKS GESTATION OF PREGNANCY: Status: ACTIVE | Noted: 2023-09-05

## 2023-09-05 NOTE — ASSESSMENT & PLAN NOTE
Letitia Woodson is a 33y.o. year old  at 15 weeks who presents for initial prenatal visit with her  Baudilio Lugo  Planned pregnancy  Having daily nausea, taking B6   Denies pelvic pain, bleeding, LOF  She has not started ASA yet, she will discuss at [facility] on 23     Prenatal labs WNL. Rh pos. GC/CT sent today. Camilo Dangelo works in an office, Baudilio Lugo works as labor    1 dog in the home     Patient Education: Patient was counseled regarding diet, exercise, weight gain, foods to avoid, vaccines in pregnancy, aneuploidy screening, travel precautions to include seat belt use and VTE risk reduction. She has been provided our pregnancy packet which includes pregnancy warning signs,how and when to contact providers, visit intervals, Maternal fetal medicine information, medication recommendations that are safe during pregnancy, dietary suggestions, activity recommendations, breastfeeding information as well as websites for additional information, and delivery location.     Past Medical History:   Diagnosis Date   • Acute sinusitis 2019   • Acute suppurative otitis media of right ear without spontaneous rupture of tympanic membrane 02/15/2019   • Asthma     stable on albuterol inhaler   • Central retinal vein occlusion of left eye     vision spared, some spots   • Situational anxiety 2018    managed with no medications currently   • Viral URI with cough 2018     Past Surgical History:   Procedure Laterality Date   • CHOLECYSTECTOMY  2017   • WISDOM TOOTH EXTRACTION       Immunization History   Administered Date(s) Administered   • COVID-19 MODERNA VACC 0.5 ML IM 2021, 02/10/2021, 12/10/2021   • DT (pediatric) 2007   • HPV 2007, 2007, 2008   • HPV Quadrivalent 2007, 2007, 2008   • Hep A, ped/adol, 2 dose 2014, 2015   • Hep B, adult 2020, 2021   • Hepatitis A 2014, 2015   • INFLUENZA 2015, 10/22/2016, 09/21/2017, 10/29/2018, 10/28/2020   • Influenza, injectable, quadrivalent, preservative free 0.5 mL 10/29/2018, 11/04/2019, 10/21/2020, 11/01/2021, 10/14/2022   • Meningococcal 11/02/2007, 10/16/2013   • Meningococcal, Unknown Serogroups 11/02/2007, 10/16/2013   • Tdap 10/14/2022   • Tuberculin Skin Test-PPD Intradermal 07/17/2018, 11/01/2021   • Varicella 02/14/2008         Family History   Problem Relation Age of Onset   • Hypertension Mother    • Diabetes Mother    • Hyperlipidemia Mother    • SRUTHI disease Mother    • Hyperlipidemia Father    • Asthma Father    • COPD Father    • Stroke Maternal Grandmother    • Hypertension Maternal Grandmother    • Dementia Maternal Grandmother    • Prostate cancer Maternal Grandfather    • Lung cancer Maternal Grandfather    • Stroke Paternal Grandmother    • Diabetes Paternal Grandfather    • Heart disease Paternal Grandfather      Social History     Tobacco Use   • Smoking status: Never   • Smokeless tobacco: Never   Vaping Use   • Vaping Use: Never used   Substance Use Topics   • Alcohol use: Not Currently   • Drug use: No       Current Outpatient Medications:   •  Calcium Carbonate (CALCIUM 500 PO), Take 500 mg by mouth in the morning, Disp: , Rfl:   •  Ferrous Sulfate (Iron) 28 MG TABS, Take by mouth, Disp: , Rfl:   •  folic acid (FOLVITE) 458 MCG tablet, Take by mouth daily, Disp: , Rfl:   •  Prenatal Vit-Fe Fumarate-FA (PRENATAL VITAMIN PO), Take by mouth, Disp: , Rfl:   •  Pyridoxine HCl (VITAMIN B-6 PO), Take by mouth, Disp: , Rfl:   Patient Active Problem List    Diagnosis Date Noted   • 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   • 11 weeks gestation of pregnancy 09/05/2023   • Encounter for fertility planning 07/10/2023   • Acute left-sided thoracic back pain 10/14/2022   • Annual physical exam 09/28/2021   • Heterozygous for MTHFR gene mutation 01/05/2021   • Central retinal vein occlusion 09/11/2020   • Mild intermittent asthma without complication 2020   • Breast nodule 2019   • Generalized anxiety disorder 2018   • Allergic rhinitis 10/03/2018       Allergies   Allergen Reactions   • Sprintec 28 [Norgestimate-Eth Estradiol] Other (See Comments)     Blood clot in eye   • Sulfa Antibiotics Rash       OB History    Para Term  AB Living   1 0 0 0 0 0   SAB IAB Ectopic Multiple Live Births   0 0 0 0 0      # Outcome Date GA Lbr Dipesh/2nd Weight Sex Delivery Anes PTL Lv   1 Current                Vitals:    23 0748   BP: 92/60   BP Location: Left arm   Patient Position: Sitting   Cuff Size: Adult   Weight: 63.7 kg (140 lb 6.4 oz)   Height: 5' 2.5" (1.588 m)     Body mass index is 25.27 kg/m².

## 2023-09-05 NOTE — PATIENT INSTRUCTIONS
Pregnancy at 11 to 14 Weeks   AMBULATORY CARE:   Changes happening to your body: You are now at the end of your first trimester and entering your second trimester. Morning sickness usually goes away by this time. You may have other symptoms such as fatigue, frequent urination, and headaches. You may have gained 2 to 4 pounds by now. Seek care immediately if:   You have pain or cramping in your abdomen or low back. You have heavy vaginal bleeding or clotting. You pass material that looks like tissue or large clots. Collect the material and bring it with you. Call your doctor or obstetrician if:   You cannot keep food or drinks down, and you are losing weight. You have light vaginal bleeding. You have chills or a fever. You have vaginal itching, burning, or pain. You have yellow, green, white, or foul-smelling vaginal discharge. You have pain or burning when you urinate, less urine than usual, or pink or bloody urine. You have questions or concerns about your condition or care. How to care for yourself at this stage of your pregnancy:       Get plenty of rest.  You may feel more tired than normal. You may need to take naps or go to bed earlier. Manage nausea and vomiting. Avoid fatty and spicy foods. Eat small meals throughout the day instead of large meals. Luz may help to decrease nausea. Ask your healthcare provider about other ways of decreasing nausea and vomiting. Eat a variety of healthy foods. Healthy foods include fruits, vegetables, whole-grain breads, low-fat dairy foods, beans, lean meats, and fish. Drink liquids as directed. Ask how much liquid to drink each day and which liquids are best for you. Limit caffeine to less than 200 milligrams each day. Limit your intake of fish to 2 servings each week. Choose fish low in mercury such as canned light tuna, shrimp, salmon, cod, or tilapia.  Do not  eat fish high in mercury such as swordfish, tilefish, gerald mackerel, and shark. Take prenatal vitamins as directed. Your need for certain vitamins and minerals, such as folic acid, increases during pregnancy. Prenatal vitamins provide some of the extra vitamins and minerals you need. Prenatal vitamins may also help to decrease the risk of certain birth defects. Do not smoke. Smoking increases your risk of a miscarriage and other health problems during your pregnancy. Smoking can cause your baby to be born too early or weigh less at birth. Ask your healthcare provider for information if you need help quitting. Do not drink alcohol. Alcohol passes from your body to your baby through the placenta. It can affect your baby's brain development and cause fetal alcohol syndrome (FAS). FAS is a group of conditions that causes mental, behavior, and growth problems. Talk to your healthcare provider before you take any medicines. Many medicines may harm your baby if you take them when you are pregnant. Do not take any medicines, vitamins, herbs, or supplements without first talking to your healthcare provider. Never use illegal or street drugs (such as marijuana or cocaine) while you are pregnant. Safety tips during pregnancy:   Avoid hot tubs and saunas. Do not use a hot tub or sauna while you are pregnant, especially during your first trimester. Hot tubs and saunas may raise your baby's temperature and increase the risk of birth defects. Avoid toxoplasmosis. This is an infection caused by eating raw meat or being around infected cat feces. It can cause birth defects, miscarriages, and other problems. Wash your hands after you touch raw meat. Make sure any meat is well-cooked before you eat it. Avoid raw eggs and unpasteurized milk. Use gloves or ask someone else to clean your cat's litter box while you are pregnant. Changes happening with your baby: Your baby has fully formed fingernails and toenails. Your baby's heartbeat can now be heard.  Ask your healthcare provider if you can listen to your baby's heartbeat. By week 14, your baby is over 4 inches long from the top of the head to the rump (baby's bottom). Your baby weighs over 3 ounces. Prenatal care:  Prenatal care is a series of visits with your healthcare provider throughout your pregnancy. During the first 28 weeks of your pregnancy, you will see your healthcare provider 1 time each month. Prenatal care can help prevent problems during pregnancy and childbirth. Your healthcare provider will check your blood pressure and weight. Your baby's heart rate will also be checked. You may also need the following at some visits:  A pelvic exam  allows your healthcare provider to see your cervix (the bottom part of your uterus). Your healthcare provider will use a speculum to open your vagina. He or she will check the size and shape of your uterus. Blood tests  may be done to check for any of the following:    Gestational diabetes or anemia (low iron level)    Blood type or Rh factor, or certain birth defects    Immunity to certain diseases, such as chickenpox or rubella    An infection, such as a sexually transmitted infection, HIV, or hepatitis B    Hepatitis B  may need to be prevented or treated. Hepatitis B is inflammation of the liver caused by the hepatitis B virus (HBV). HBV can spread from a mother to her baby during delivery. You will be checked for HBV as early as possible in the first trimester of each pregnancy. You need the test even if you received the hepatitis B vaccine or were tested before. You may need to have an HBV infection treated before you give birth. Urine tests  may also be done to check for sugar and protein. These can be signs of gestational diabetes or preeclampsia. Urine tests may also be done to check for signs of infection. A fetal ultrasound  shows pictures of your baby inside your uterus. The pictures are used to check your baby's development, movement, and position. Genetic disorder screening tests  may be offered to you. These tests check your baby's risk for genetic disorders such as Down syndrome. A screening test includes a blood test and ultrasound. Follow up with your doctor or obstetrician as directed:  Go to all prenatal visits. Write down your questions so you remember to ask them during your visits. © Copyright Alan Schultz 2022 Information is for End User's use only and may not be sold, redistributed or otherwise used for commercial purposes. The above information is an  only. It is not intended as medical advice for individual conditions or treatments. Talk to your doctor, nurse or pharmacist before following any medical regimen to see if it is safe and effective for you. Round Ligament Pain   WHAT YOU NEED TO KNOW:   What is round ligament pain? Round ligament pain is caused when ligaments are stretched as your uterus (womb) gets bigger during pregnancy. Round ligaments are found on each side of your uterus. They are bands of tissue that hold the uterus in place. Round ligament pain happens most often during the second trimester. It is a normal part of pregnancy and should stop by the third trimester. The pain is not serious and will not hurt your baby. What are the signs and symptoms of round ligament pain? Pain on one or both sides of your lower abdomen or groin that may move up to your hip    Spasms in the muscles in your abdomen    Pain that lasts a few seconds    Pain that happens when you exercise, sneeze, change positions, or stand quickly    How is round ligament pain diagnosed? Your healthcare provider will examine you and ask about your pain. Tell the provider when the pain started, and if you feel it on one or both sides. Your provider may ask if anything helps the pain or makes it worse. What can I do to manage my pain? Round ligament pain does not need to be treated.  The following may help make you more comfortable:  Rest as often as you can. Rest can help relieve round ligament pain. You might want to lie on the side that has pain. Place a pillow under your abdomen. Keep another pillow between your knees. Move slowly. Sudden movement can stretch the ligaments and cause pain. Stand, sit, and change positions slowly. Try to tighten the muscles in your hips before you sneeze or laugh. You can also sit down and bring your knees up toward your abdomen. This can help relieve tension on the ligaments. Exercise as directed. Gentle exercise can keep the ligaments loose and strengthen core (abdominal) muscles. An example is swimming, or a yoga program designed for pregnancy. Ask your healthcare provider which exercises are safe for you and how often to exercise. For most healthy women, a good goal is to try to get at least 30 minutes of exercise every day. If activity causes pain, try not to walk too long or too far at one time. Break your exercise up into short amounts. Apply a warm compress to the area. Warmth can relieve pain and muscle spasms. Ask your healthcare provider if you can take a warm bath or use a heating pad. Keep all heat settings low. High heat can be dangerous for your baby. Do not sit in a hot tub or use hot water in your bath. You may also be able to massage the area gently while you are applying heat. Massage can help relieve pain. Ask about pain medicines. Ask your healthcare provider before you take any medicine during pregnancy, including over-the-counter pain medicines. Your healthcare provider may recommend acetaminophen to relieve the pain. Ask how much to take and how often to take it. Follow directions. Acetaminophen can cause liver damage. Too much medicine can be harmful to your baby. When should I contact my healthcare provider? You have pain that is spreading to other parts of your body. You have new or worsening pain.     You have pain that lasts longer than a few minutes at a time. You have questions or concerns about your condition or care. CARE AGREEMENT:   You have the right to help plan your care. Learn about your health condition and how it may be treated. Discuss treatment options with your healthcare providers to decide what care you want to receive. You always have the right to refuse treatment. The above information is an  only. It is not intended as medical advice for individual conditions or treatments. Talk to your doctor, nurse or pharmacist before following any medical regimen to see if it is safe and effective for you. © Copyright Dalia Galindo 2022 Information is for End User's use only and may not be sold, redistributed or otherwise used for commercial purposes. Satinder Owen Contractions   AMBULATORY CARE:   Jacobo Milton contractions  are tightening and squeezing of the muscles of your uterus (womb) during pregnancy. The uterine muscles control the uterus. Isabela Owen contractions stop on their own. They are not true labor contractions and do not cause your cervix (opening to your uterus) to dilate (open). Common symptoms include the following:   Pain or discomfort in your groin or lower abdomen that comes and goes    Your contractions are short, and do not last longer each time they happen    Your contractions do not get closer together each time    Your contractions do not get stronger or more painful each time    Your contractions stop when you change your position or rest    Seek care immediately if:   You have bleeding from your vagina. You have fluid leaking from your vagina that does not stop. You feel a gush of fluid from your vagina. Your contractions happen every 5 minutes or sooner, and last for more than 60 seconds. Your contractions begin to feel stronger or more painful. You feel a change in your baby's movement, or you feel fewer than 6 to 10 movements in an hour.     Call your doctor or obstetrician if: You have a fever. You have questions or concerns about your condition or care. Treatment for Satinder Owen contractions  may include pain medicine to relieve discomfort or pain or sedatives to relax the muscles of your uterus. If you are dehydrated, he or she may give you fluids through an IV or tell you to drink liquids. Self-care:   Change your activity or your position  when you feel contractions begin. Walk if you have been lying or sitting. Lie down if you have been standing or walking. True labor will not stop by changing your position or activity. Take a warm bath  to relax your body. Drink more liquids  to prevent dehydration. Ask how much liquid to drink each day and which liquids are best for you. Practice your labor breathing  to decrease your discomfort. This may help you get ready for true labor. Take slow, deep breaths, or fast, short breaths. Ask your healthcare provider how to practice labor breathing. Follow up with your doctor or obstetrician as directed:  Write down your questions so you remember to ask them during your visits. © Copyright Yang Sorenson 2022 Information is for End User's use only and may not be sold, redistributed or otherwise used for commercial purposes. The above information is an  only. It is not intended as medical advice for individual conditions or treatments. Talk to your doctor, nurse or pharmacist before following any medical regimen to see if it is safe and effective for you. Valuable Online Resource:    [facility] pregnancy essential guide    http://ventura.joe/      On the right side of the screen is a 50 page guide providing valuable information about your entire pregnancy.     On the left hand side of the site you will see several other links to great information and resources that [employer] [facility] offers     If you click on the tab that says "Pregnancy and Birth Packet" this opens another  guide to labor and delivery information as well as breast feeding information,  care, pediatricians, car seat safety and much more     The [facility] tab has a virtual tour of the new L&D unit, as well as valuable information about classes that are offered, breast feeding support, support groups and much more. I highly recommend the virtual Breast Feeding class, very informational even if you have breast fed in the past. Check for available dates ! Click around and enjoy all we have to offer!     Please note that all information in regards to locations and visiting hours have not been updated due to  [facility] delivery location is [facility] @ [address] [place] [address], [address] [place] [id number]

## 2023-09-06 LAB
CF COMMENT: NORMAL
CFTR MUT ANL BLD/T: NORMAL

## 2023-09-07 ENCOUNTER — INITIAL PRENATAL (OUTPATIENT)
Dept: OBGYN CLINIC | Facility: CLINIC | Age: 28
End: 2023-09-07

## 2023-09-07 VITALS
DIASTOLIC BLOOD PRESSURE: 60 MMHG | BODY MASS INDEX: 24.88 KG/M2 | WEIGHT: 140.4 LBS | SYSTOLIC BLOOD PRESSURE: 92 MMHG | HEIGHT: 63 IN

## 2023-09-07 DIAGNOSIS — Z3A.11 11 WEEKS GESTATION OF PREGNANCY: ICD-10-CM

## 2023-09-07 DIAGNOSIS — Z11.3 SCREEN FOR STD (SEXUALLY TRANSMITTED DISEASE): ICD-10-CM

## 2023-09-07 DIAGNOSIS — D68.59 ANTITHROMBIN III DEFICIENCY (HCC): ICD-10-CM

## 2023-09-07 DIAGNOSIS — Z34.01 PRENATAL CARE, FIRST PREGNANCY, FIRST TRIMESTER: Primary | ICD-10-CM

## 2023-09-07 PROCEDURE — PNV: Performed by: OBSTETRICS & GYNECOLOGY

## 2023-09-07 PROCEDURE — 87491 CHLMYD TRACH DNA AMP PROBE: CPT | Performed by: OBSTETRICS & GYNECOLOGY

## 2023-09-07 PROCEDURE — 87591 N.GONORRHOEAE DNA AMP PROB: CPT | Performed by: OBSTETRICS & GYNECOLOGY

## 2023-09-07 NOTE — PROGRESS NOTES
Problem   Antithrombin III Deficiency (Hcc)   11 Weeks Gestation of Pregnancy    Blood Type: A. +        Pap 2022. Neg , GC/CT   PN1 Labs:  NML + early glucose NML,  H&H: 13.8/42.1  28 Week Labs:  AFP:  Level 1:  Level 2:  Tdap:  Flu:   GBS swab:     Blue folder:Given   Yellow folder:     Breast pump order:  L&D forms:    Delivery consent:   IOL:            Heterozygous for Mthfr Gene Mutation     11 weeks gestation of pregnancy  PN1. Nina Reyes is a 33y.o. year old  at 15 weeks who presents for initial prenatal visit with her  Leatha Ovalle  Planned pregnancy  Having daily nausea, taking B6   Denies pelvic pain, bleeding, LOF  She has not started ASA yet, she will discuss at [facility] on 23     Prenatal labs WNL. Rh pos. GC/CT sent today. Nina Reyes works in an office, Leatha Ovalle works as labor    1 dog in the homem     Patient Education: Patient was counseled regarding diet, exercise, weight gain, foods to avoid, vaccines in pregnancy, aneuploidy screening, travel precautions to include seat belt use and VTE risk reduction. She has been provided our pregnancy packet which includes pregnancy warning signs,how and when to contact providers, visit intervals, Maternal fetal medicine information, medication recommendations that are safe during pregnancy, dietary suggestions, activity recommendations, breastfeeding information as well as websites for additional information, and delivery location.     Past Medical History:   Diagnosis Date   • Acute sinusitis 2019   • Acute suppurative otitis media of right ear without spontaneous rupture of tympanic membrane 02/15/2019   • Asthma     stable on albuterol inhaler   • Central retinal vein occlusion of left eye     vision spared, some spots   • Situational anxiety 2018    managed with no medications currently   • Viral URI with cough 2018     Past Surgical History:   Procedure Laterality Date   • CHOLECYSTECTOMY  2017   • WISDOM TOOTH EXTRACTION       Immunization History   Administered Date(s) Administered   • COVID-19 MODERNA VACC 0.5 ML IM 01/13/2021, 02/10/2021, 12/10/2021   • DT (pediatric) 08/14/2007   • HPV 08/14/2007, 11/02/2007, 02/14/2008   • HPV Quadrivalent 08/14/2007, 11/02/2007, 02/14/2008   • Hep A, ped/adol, 2 dose 09/26/2014, 09/25/2015   • Hep B, adult 12/26/2020, 01/24/2021   • Hepatitis A 09/26/2014, 09/25/2015   • INFLUENZA 09/25/2015, 10/22/2016, 09/21/2017, 10/29/2018, 10/28/2020   • Influenza, injectable, quadrivalent, preservative free 0.5 mL 10/29/2018, 11/04/2019, 10/21/2020, 11/01/2021, 10/14/2022   • Meningococcal 11/02/2007, 10/16/2013   • Meningococcal, Unknown Serogroups 11/02/2007, 10/16/2013   • Tdap 10/14/2022   • Tuberculin Skin Test-PPD Intradermal 07/17/2018, 11/01/2021   • Varicella 02/14/2008         Family History   Problem Relation Age of Onset   • Hypertension Mother    • Diabetes Mother    • Hyperlipidemia Mother    • SRUTHI disease Mother    • Hyperlipidemia Father    • Asthma Father    • COPD Father    • Stroke Maternal Grandmother    • Hypertension Maternal Grandmother    • Dementia Maternal Grandmother    • Prostate cancer Maternal Grandfather    • Lung cancer Maternal Grandfather    • Stroke Paternal Grandmother    • Diabetes Paternal Grandfather    • Heart disease Paternal Grandfather      Social History     Tobacco Use   • Smoking status: Never   • Smokeless tobacco: Never   Vaping Use   • Vaping Use: Never used   Substance Use Topics   • Alcohol use: Not Currently   • Drug use: No       Current Outpatient Medications:   •  Calcium Carbonate (CALCIUM 500 PO), Take 500 mg by mouth in the morning, Disp: , Rfl:   •  Ferrous Sulfate (Iron) 28 MG TABS, Take by mouth, Disp: , Rfl:   •  folic acid (FOLVITE) 506 MCG tablet, Take by mouth daily, Disp: , Rfl:   •  Prenatal Vit-Fe Fumarate-FA (PRENATAL VITAMIN PO), Take by mouth, Disp: , Rfl:   •  Pyridoxine HCl (VITAMIN B-6 PO), Take by mouth, Disp: , Rfl:   Patient Active Problem List    Diagnosis Date Noted   • Antithrombin III deficiency ([address]) 2023   • 11 weeks gestation of pregnancy 2023   • Encounter for fertility planning 07/10/2023   • Acute left-sided thoracic back pain 10/14/2022   • Annual physical exam 2021   • Heterozygous for MTHFR gene mutation 2021   • Central retinal vein occlusion 2020   • Mild intermittent asthma without complication    • Breast nodule 2019   • Generalized anxiety disorder 2018   • Allergic rhinitis 10/03/2018       Allergies   Allergen Reactions   • Sprintec 28 [Norgestimate-Eth Estradiol] Other (See Comments)     Blood clot in eye   • Sulfa Antibiotics Rash       OB History    Para Term  AB Living   1 0 0 0 0 0   SAB IAB Ectopic Multiple Live Births   0 0 0 0 0      # Outcome Date GA Lbr Dipesh/2nd Weight Sex Delivery Anes PTL Lv   1 Current                Vitals:    23 0748   BP: 92/60   BP Location: Left arm   Patient Position: Sitting   Cuff Size: Adult   Weight: 63.7 kg (140 lb 6.4 oz)   Height: 5' 2.5" (1.588 m)     Body mass index is 25.27 kg/m².         Heterozygous for MTHFR gene mutation  Will discuss with MFM on 23 for recommnedations    Antithrombin III deficiency [facility])  Will discuss with MFM on 23 for recommnedations

## 2023-09-07 NOTE — PROGRESS NOTES
Patient presents for initial prenatal visit.   [id number]  DANDY: 3/21/24  Last pap: 3/7/22-neg  Prenatal labs completed  Nuchal u/s: scheduled for   Anatomy u/s: scheduled for   [facility] folder given and reviewed at today's visit. No LOF, bleeding, cramping or discharge. No questions or concerns for today's visit.

## 2023-09-08 LAB
C TRACH DNA SPEC QL NAA+PROBE: NEGATIVE
N GONORRHOEA DNA SPEC QL NAA+PROBE: NEGATIVE

## 2023-09-11 ENCOUNTER — ROUTINE PRENATAL (OUTPATIENT)
Facility: HOSPITAL | Age: 28
End: 2023-09-11
Payer: COMMERCIAL

## 2023-09-11 VITALS
HEART RATE: 88 BPM | HEIGHT: 63 IN | WEIGHT: 144.18 LBS | BODY MASS INDEX: 25.55 KG/M2 | SYSTOLIC BLOOD PRESSURE: 100 MMHG | DIASTOLIC BLOOD PRESSURE: 66 MMHG

## 2023-09-11 DIAGNOSIS — D68.59 ANTITHROMBIN III DEFICIENCY (HCC): ICD-10-CM

## 2023-09-11 DIAGNOSIS — Z15.89 HETEROZYGOUS FOR MTHFR GENE MUTATION: ICD-10-CM

## 2023-09-11 DIAGNOSIS — Z34.01 ENCOUNTER FOR SUPERVISION OF NORMAL FIRST PREGNANCY IN FIRST TRIMESTER: ICD-10-CM

## 2023-09-11 DIAGNOSIS — N91.1 SECONDARY AMENORRHEA: ICD-10-CM

## 2023-09-11 DIAGNOSIS — Z36.82 ENCOUNTER FOR (NT) NUCHAL TRANSLUCENCY SCAN: ICD-10-CM

## 2023-09-11 DIAGNOSIS — H34.8192 CENTRAL RETINAL VEIN OCCLUSION, UNSPECIFIED COMPLICATION STATUS, UNSPECIFIED LATERALITY: ICD-10-CM

## 2023-09-11 DIAGNOSIS — Z3A.12 12 WEEKS GESTATION OF PREGNANCY: Primary | ICD-10-CM

## 2023-09-11 PROCEDURE — 76813 OB US NUCHAL MEAS 1 GEST: CPT | Performed by: OBSTETRICS & GYNECOLOGY

## 2023-09-11 PROCEDURE — 36415 COLL VENOUS BLD VENIPUNCTURE: CPT | Performed by: OBSTETRICS & GYNECOLOGY

## 2023-09-11 PROCEDURE — 99204 OFFICE O/P NEW MOD 45 MIN: CPT | Performed by: OBSTETRICS & GYNECOLOGY

## 2023-09-11 RX ORDER — ENOXAPARIN SODIUM 100 MG/ML
40 INJECTION SUBCUTANEOUS DAILY
Qty: 12 ML | Refills: 9 | Status: SHIPPED | OUTPATIENT
Start: 2023-09-11 | End: 2024-07-07

## 2023-09-11 RX ORDER — ASPIRIN 81 MG/1
162 TABLET, CHEWABLE ORAL DAILY
Qty: 60 TABLET | Refills: 3 | Status: SHIPPED | OUTPATIENT
Start: 2023-09-11 | End: 2023-10-11

## 2023-09-11 NOTE — PROGRESS NOTES
Patient chose to have [facility] Non-invasive Prenatal Screen with fetal sex. Patient given brochure and is aware [facility] will contact their insurance and coordinate coverage. Patient made aware she will need to respond to text message or e-mail from [address] within 2 business days or testing will be run through insurance. Patient informed text message will come from area code  "415". Provided [employer] # [phone] and web site : [email or web address].   "[place] your test online" card with barcode and test tube ID provided to patient. Reviewed "[employer]e's web site states 5-7 business days for results via their portal.   Pianpian message will be sent to patient when [facility] receives results /provider reviews. 2 vials of blood drawn from  right arm by [facility] RN. Patient tolerated blood draw without difficulty. Specimens labeled with patient identifiers (name, date of birth, specimen collection date), order and specimen were verified with patient, packed and sent via [address]. [employer]  tracking #  [id number]  Copy of lab order scanned to Epic media. Maternal Fetal Medicine will have results in approximately 7-10 business days and will call patient or notify via [address] [place] [address]. Patient aware viewing lab result online will reveal fetal sex if ordered. Patient verbalized understanding of all instructions and no questions at this time.

## 2023-09-11 NOTE — PROGRESS NOTES
[address]: Roman Ely was seen today for nuchal translucency ultrasound. See ultrasound report under "OB Procedures" tab. MDM:   I. Diagnoses/Problems addressed:  aneuploidy screening, prior venous embolism in pregnancy, family history of preeclampsia  II. Data: I reviewed thrombophilia panel lab tests ordered by another provider. I ordered NIPS. I reviewed initial prenatal. I reviewed heme consult note from 8/31  III.   Risk of morbidity: Moderate (lovenox and aspirin)    Please don't hesitate to contact our office with any concerns or questions.  -Michelle Moseley MD Yes

## 2023-09-11 NOTE — PATIENT INSTRUCTIONS
You elected to have non-invasive prenatal screening (NIPS). This involves a blood test to check for the four most common genetic syndromes (Trisomy 21, Trisomy 13, Trisomy 25, and sex chromosome abnormalities). It also MAY report the biologic sex of the fetus if you opted to learn this information. You can call our office to verbally review results to avoid inadvertently learning this information via [employer], if desired. Results will be visible in your [email or web address] portal 7-10 business days from when the test is drawn. Please follow all instructions regarding insurance cost/coverage provided to you today. Please contact our office with any concerns or questions. You will need spina bifida screening (called MSAFP) for the baby beginning at 15 weeks gestation, which will be ordered by your obstetrician's office. This test allows for earlier detection of spina bifida than is possible by ultrasound, and is advised in all pregnancies. The use of low dose aspirin in pregnancy (162mg) is recommended in women with a high risk, or multiple moderate risk factors for preeclampsia. Aspirin therapy should be initiated between 12-28 weeks gestation, and is most effective if started prior to 16 weeks gestation, and continued until 36 weeks gestation. Low dose aspirin in pregnancy has been shown to reduce the incidence of preeclampsia in women with risk factors, and has been shown to be safe and without significant maternal or fetal risk. In light of your risk factors for preeclampsia, including: Primiparity (first pregnancy) and family history, I recommend initiating aspirin therapy.

## 2023-09-11 NOTE — LETTER
2023    Jacey Porter PA-C  [address] [place] [address] Dr Hall    Patient: Olesya Christianson   YOB: 1995   Date of Visit: 2023   Gestational age 15w1d   Irl Mount of this communication: Routine though please note started on lovenox       Dear Brooklyn Randle,    This patient was seen recently in our  office. The content of my evaluation today is in the ultrasound report under "OB Procedures" tab. Please don't hesitate to contact our office with any concerns or questions.      Sincerely,      Martha Zamora MD  Attending Physician, [address] [place] [address]

## 2023-09-14 ENCOUNTER — CLINICAL SUPPORT (OUTPATIENT)
Dept: PERINATAL CARE | Facility: OTHER | Age: 28
End: 2023-09-14

## 2023-09-14 DIAGNOSIS — Z3A.13 13 WEEKS GESTATION OF PREGNANCY: Primary | ICD-10-CM

## 2023-09-14 NOTE — PROGRESS NOTES
Pt educated on Lovenox injections sites, rotation of sites, cleansing of site, injecting medication, activation of safety shield after administration, disposal of needle and signs/symptoms to report. Instructed to sit or lie down for injection. Notify your dentist you are on this medication. Store medication away from children or pets. Call your Dr. If you are experiencing any of the following:  Unusual bleeding or bleeding that will not stop. Nose bleed, bleeding gums. Vaginal bleeding, blood in urine or stool. Unusual tiredness, fever, feeling light headed or short of breath, injection site pain. Common side effects:  Nausea, diarrhea, confusion, bruising, redness, irritation. This list is not complete, call your doctor with any questions. Pt was receptive and demonstrated the administration on herself without difficulties. Patients questions were answered and she declined further questions at end of teaching.

## 2023-10-05 ENCOUNTER — ROUTINE PRENATAL (OUTPATIENT)
Dept: OBGYN CLINIC | Facility: CLINIC | Age: 28
End: 2023-10-05
Payer: COMMERCIAL

## 2023-10-05 VITALS
HEART RATE: 73 BPM | BODY MASS INDEX: 26.89 KG/M2 | SYSTOLIC BLOOD PRESSURE: 102 MMHG | WEIGHT: 149.4 LBS | OXYGEN SATURATION: 99 % | DIASTOLIC BLOOD PRESSURE: 74 MMHG

## 2023-10-05 DIAGNOSIS — Z15.89 HETEROZYGOUS FOR MTHFR GENE MUTATION: ICD-10-CM

## 2023-10-05 DIAGNOSIS — Z3A.16 16 WEEKS GESTATION OF PREGNANCY: ICD-10-CM

## 2023-10-05 DIAGNOSIS — Z34.92 PRENATAL CARE IN SECOND TRIMESTER: Primary | ICD-10-CM

## 2023-10-05 DIAGNOSIS — Z36.9 UNSPECIFIED ANTENATAL SCREENING: ICD-10-CM

## 2023-10-05 PROBLEM — Z31.89 ENCOUNTER FOR FERTILITY PLANNING: Status: RESOLVED | Noted: 2023-07-10 | Resolved: 2023-10-05

## 2023-10-05 PROBLEM — Z00.00 ANNUAL PHYSICAL EXAM: Status: RESOLVED | Noted: 2021-09-28 | Resolved: 2023-10-05

## 2023-10-05 PROBLEM — D68.59 ANTITHROMBIN III DEFICIENCY (HCC): Status: RESOLVED | Noted: 2023-09-07 | Resolved: 2023-10-05

## 2023-10-05 LAB
SL AMB  POCT GLUCOSE, UA: NORMAL
SL AMB POCT URINE PROTEIN: NORMAL

## 2023-10-05 PROCEDURE — 99213 OFFICE O/P EST LOW 20 MIN: CPT | Performed by: STUDENT IN AN ORGANIZED HEALTH CARE EDUCATION/TRAINING PROGRAM

## 2023-10-05 PROCEDURE — 81002 URINALYSIS NONAUTO W/O SCOPE: CPT | Performed by: STUDENT IN AN ORGANIZED HEALTH CARE EDUCATION/TRAINING PROGRAM

## 2023-10-05 NOTE — ASSESSMENT & PLAN NOTE
28yo  at 16.0wks presents for routine annual exam.     Pt denies contractions, vaginal bleeding, leakage of fluid. Pt reports her partner recently lost his job and she no longer has insurance. Pt only has 6 more doses of lovenox left. She has been calling health insurance agency to try to regain emergency insurance as she is pregnant. Our office has been working on this situation as well.  The number for financial aid office at [facility] was also provided to patient today to see if she can get additional support.     -continue PNVs and ASA  -AFP ordered today   -Level 2 scheduled for 23  -bleeding and  labor precautions provided   -follow up in 4 weeks

## 2023-10-05 NOTE — PROGRESS NOTES
16 weeks gestation of pregnancy  28yo  at 16.0wks presents for routine annual exam.     Pt denies contractions, vaginal bleeding, leakage of fluid. Pt reports her partner recently lost his job and she no longer has insurance. Pt only has 6 more doses of lovenox left. She has been calling health insurance agency to try to regain emergency insurance as she is pregnant. Our office has been working on this situation as well. The number for [facility] was also provided to patient today to see if she can get additional support.     -continue PNVs and ASA  -AFP ordered today   -Level 2 scheduled for 23  -bleeding and  labor precautions provided   -follow up in 4 weeks     Heterozygous for MTHFR gene mutation  -has a history of central retinal vein occlusion / blood clot in setting of prolonged OCP use. Workup at this time showed heterozygous MTHFR gene mutation. There was also a suspicion for antithrombin III deficiency, however, recent hematologist appointment confirmed that the patient does NOT have antithrombin III deficiency. -per MFM recommendations, pt started on Lovenox 40mg daily due to above history of blood clot. Patient compliant with injections. Does have some bruising on the abdomen due to injection but otherwise has no issues. -as above, pt only has 6 more doses of lovenox left and is unable to refill as she just lost her insurance. Pt instructed to call if she continues to be unsuccessful with obtaining insurance.

## 2023-10-05 NOTE — ASSESSMENT & PLAN NOTE
-has a history of central retinal vein occlusion / blood clot in setting of prolonged OCP use. Workup at this time showed heterozygous MTHFR gene mutation. There was also a suspicion for antithrombin III deficiency, however, recent hematologist appointment confirmed that the patient does NOT have antithrombin III deficiency. -per [facility] recommendations, pt started on Lovenox 40mg daily due to above history of blood clot. Patient compliant with injections. Does have some bruising on the abdomen due to injection but otherwise has no issues. -as above, pt only has 6 more doses of lovenox left and is unable to refill as she just lost her insurance. Pt instructed to call if she continues to be unsuccessful with obtaining insurance.

## 2023-10-16 ENCOUNTER — OFFICE VISIT (OUTPATIENT)
Dept: INTERNAL MEDICINE CLINIC | Facility: OTHER | Age: 28
End: 2023-10-16
Payer: COMMERCIAL

## 2023-10-16 ENCOUNTER — TELEPHONE (OUTPATIENT)
Dept: OBGYN CLINIC | Facility: CLINIC | Age: 28
End: 2023-10-16

## 2023-10-16 VITALS
TEMPERATURE: 98.3 F | HEIGHT: 65 IN | WEIGHT: 151.2 LBS | BODY MASS INDEX: 25.19 KG/M2 | SYSTOLIC BLOOD PRESSURE: 118 MMHG | DIASTOLIC BLOOD PRESSURE: 76 MMHG | OXYGEN SATURATION: 99 % | HEART RATE: 78 BPM

## 2023-10-16 DIAGNOSIS — Z00.00 ANNUAL PHYSICAL EXAM: Primary | ICD-10-CM

## 2023-10-16 DIAGNOSIS — Z3A.17 17 WEEKS GESTATION OF PREGNANCY: ICD-10-CM

## 2023-10-16 DIAGNOSIS — R10.31 BILATERAL LOWER ABDOMINAL DISCOMFORT: ICD-10-CM

## 2023-10-16 DIAGNOSIS — R10.32 BILATERAL LOWER ABDOMINAL DISCOMFORT: ICD-10-CM

## 2023-10-16 PROCEDURE — 99395 PREV VISIT EST AGE 18-39: CPT | Performed by: INTERNAL MEDICINE

## 2023-10-16 NOTE — TELEPHONE ENCOUNTER
OB patient had a physical with her PCP today and it was recommended that she call our office. She is having mild cramping, bloating and headaches. 17w pregnant    On-set: Saturday 10/14/23    Please advise  Best callback number ending in: [phone]

## 2023-10-16 NOTE — TELEPHONE ENCOUNTER
Pt saw pcp for physical today and he told her to call us. Pt has had a headache since Fri       17 and 4   She says it is not a migraine . She also has abdominal cramps, This pt is on lovenox also. I recommended tylenol and rest but am running it by you . Her blood pressure was fine  118/76.  Because pcp told her to call I am running this by you.  Thank you

## 2023-10-16 NOTE — PROGRESS NOTES
ADULT ANNUAL PHYSICAL  [facility] CARE Vest    NAME: Debra Haynes  AGE: 33 y.o. SEX: female  : 1995     DATE: 10/16/2023     Assessment and Plan:     Problem List Items Addressed This Visit          Other    Annual physical exam - Primary     Discussed diet and exercise. She is up-to-date on immunizations. Continue follow-up with OB/GYN. 17 weeks gestation of pregnancy    Bilateral lower abdominal discomfort       Immunizations and preventive care screenings were discussed with patient today. Appropriate education was printed on patient's after visit summary. Counseling:  Alcohol/drug use: discussed moderation in alcohol intake, the recommendations for healthy alcohol use, and avoidance of illicit drug use. Dental Health: discussed importance of regular tooth brushing, flossing, and dental visits. Injury prevention: discussed safety/seat belts, safety helmets, smoke detectors, carbon dioxide detectors, and smoking near bedding or upholstery. Sexual health: discussed sexually transmitted diseases, partner selection, use of condoms, avoidance of unintended pregnancy, and contraceptive alternatives. Exercise: the importance of regular exercise/physical activity was discussed. Recommend exercise 3-5 times per week for at least 30 minutes. BMI Counseling: Body mass index is 25.16 kg/m². The BMI is above normal. Nutrition recommendations include encouraging healthy choices of fruits and vegetables, decreasing fast food intake, consuming healthier snacks, limiting drinks that contain sugar, moderation in carbohydrate intake, increasing intake of lean protein, reducing intake of saturated and trans fat and reducing intake of cholesterol. Exercise recommendations include exercising 3-5 times per week. No pharmacotherapy was ordered. Patient referred to PCP.  Rationale for BMI follow-up plan is due to patient being overweight or obese. Depression Screening and Follow-up Plan: Patient was screened for depression during today's encounter. They screened negative with a PHQ-2 score of 1. Return in about 1 year (around 10/16/2024) for Annual physical.     Chief Complaint:     Chief Complaint   Patient presents with    Physical Exam    HM     Pt here for:  Physical   PHQ       History of Present Illness:     Adult Annual Physical   Patient here for a comprehensive physical exam. The patient reports problems - : She is currently 17 weeks pregnant and is on lovenox for DVT ppx given h/o blood clots. She has been experiencing abdominal bloating, lightheadedness, headache, and abdominal cramping. She denies any vaginal bleeding. She reported normal BMs and urination. She denies any neurologic symptoms/deficits. She admits to lovenox injection site bruising. .    Diet and Physical Activity  Diet/Nutrition: well balanced diet, consuming 3-5 servings of fruits/vegetables daily, adequate fiber intake, and adequate whole grain intake. Exercise: walking. Depression Screening  PHQ-2/9 Depression Screening    Little interest or pleasure in doing things: 0 - not at all  Feeling down, depressed, or hopeless: 1 - several days  PHQ-2 Score: 1  PHQ-2 Interpretation: Negative depression screen       General Health  Sleep: sleeps well and gets 7-8 hours of sleep on average. Hearing: normal - bilateral.  Vision: no vision problems, goes for regular eye exams, and wears glasses. Dental: regular dental visits, brushes teeth twice daily, and flosses teeth occasionally. /GYN Health  Currently 17 weeks pregnant,     3 [address] [place] you have an advanced directive? no  Do you have a durable medical power of ? no     Review of Systems:     Review of Systems   Constitutional:  Negative for activity change, appetite change, chills, diaphoresis, fatigue and fever.    HENT:  Negative for congestion, postnasal drip, rhinorrhea, sinus pressure, sinus pain, sneezing and sore throat. Eyes:  Negative for visual disturbance. Respiratory:  Negative for apnea, cough, choking, chest tightness, shortness of breath and wheezing. Cardiovascular:  Negative for chest pain, palpitations and leg swelling. Gastrointestinal:  Negative for abdominal distention, abdominal pain, anal bleeding, blood in stool, constipation, diarrhea, nausea and vomiting. Endocrine: Negative for cold intolerance and heat intolerance. Genitourinary:  Negative for difficulty urinating, dysuria and hematuria. Musculoskeletal: Negative. Skin: Negative. Neurological:  Negative for dizziness, weakness, light-headedness, numbness and headaches. Hematological:  Negative for adenopathy. Psychiatric/Behavioral:  Negative for agitation, sleep disturbance and suicidal ideas. All other systems reviewed and are negative.      Past Medical History:     Past Medical History:   Diagnosis Date    Acute sinusitis 02/14/2019    Acute suppurative otitis media of right ear without spontaneous rupture of tympanic membrane 02/15/2019    Asthma     stable on albuterol inhaler    Central retinal vein occlusion of left eye 2020    vision spared, some spots    Situational anxiety 11/13/2018    managed with no medications currently    Viral URI with cough 08/23/2018      Past Surgical History:     Past Surgical History:   Procedure Laterality Date    CHOLECYSTECTOMY  12/2017    WISDOM TOOTH EXTRACTION        Social History:     Social History     Socioeconomic History    Marital status: /Civil Union     Spouse name: None    Number of children: None    Years of education: None    Highest education level: None   Occupational History    None   Tobacco Use    Smoking status: Never    Smokeless tobacco: Never   Vaping Use    Vaping Use: Never used   Substance and Sexual Activity    Alcohol use: Not Currently    Drug use: No    Sexual activity: Yes     Partners: Male     Birth control/protection: None   Other Topics Concern    None   Social History Narrative    None     Social Determinants of Health     Financial Resource Strain: Not on file   Food Insecurity: Not on file   Transportation Needs: Not on file   Physical Activity: Not on file   Stress: Not on file   Social Connections: Not on file   Intimate Partner Violence: Not on file   Housing Stability: Not on file      Family History:     Family History   Problem Relation Age of Onset    Hypertension Mother     Diabetes Mother     Hyperlipidemia Mother     SRUTHI disease Mother     Hyperlipidemia Father     Asthma Father     COPD Father     Stroke Maternal Grandmother     Hypertension Maternal Grandmother     Dementia Maternal Grandmother     Prostate cancer Maternal Grandfather     Lung cancer Maternal Grandfather     Stroke Paternal Grandmother     Diabetes Paternal Grandfather     Heart disease Paternal Grandfather       Current Medications:     Current Outpatient Medications   Medication Sig Dispense Refill    aspirin 81 mg chewable tablet Chew 2 tablets (162 mg total) daily 60 tablet 3    Calcium Carbonate (CALCIUM 500 PO) Take 500 mg by mouth in the morning      enoxaparin (Lovenox) 40 mg/0.4 mL Inject 0.4 mL (40 mg total) under the skin in the morning 12 mL 9    Ferrous Sulfate (Iron) 28 MG TABS Take by mouth      Prenatal Vit-Fe Fumarate-FA (PRENATAL VITAMIN PO) Take by mouth      Pyridoxine HCl (VITAMIN B-6 PO) Take by mouth      folic acid (FOLVITE) 389 MCG tablet Take by mouth daily (Patient not taking: Reported on 10/16/2023)       No current facility-administered medications for this visit. Allergies:      Allergies   Allergen Reactions    Sprintec 28 [Norgestimate-Eth Estradiol] Other (See Comments)     Blood clot in eye    Sulfa Antibiotics Rash      Physical Exam:     /76 (BP Location: Left arm, Patient Position: Sitting, Cuff Size: Standard)   Pulse 78   Temp 98.3 °F (36.8 °C) (Temporal)   Ht 5' 5" (1.651 m)   Wt 68.6 kg (151 lb 3.2 oz)   LMP 06/15/2023   SpO2 99%   BMI 25.16 kg/m²     Physical Exam  Vitals and nursing note reviewed. Constitutional:       General: She is not in acute distress. Appearance: Normal appearance. She is normal weight. She is not ill-appearing. HENT:      Head: Normocephalic and atraumatic. Right Ear: Tympanic membrane, ear canal and external ear normal. There is no impacted cerumen. Left Ear: Tympanic membrane, ear canal and external ear normal. There is no impacted cerumen. Nose: Nose normal. No congestion or rhinorrhea. Mouth/Throat:      Mouth: Mucous membranes are moist.      Pharynx: Oropharynx is clear. No oropharyngeal exudate or posterior oropharyngeal erythema. Eyes:      General: No scleral icterus. Right eye: No discharge. Left eye: No discharge. Extraocular Movements: Extraocular movements intact. Conjunctiva/sclera: Conjunctivae normal.      Pupils: Pupils are equal, round, and reactive to light. Neck:      Vascular: No carotid bruit. Cardiovascular:      Rate and Rhythm: Normal rate and regular rhythm. Pulses: Normal pulses. Heart sounds: Normal heart sounds. No murmur heard. No friction rub. No gallop. Pulmonary:      Effort: Pulmonary effort is normal. No respiratory distress. Breath sounds: Normal breath sounds. No stridor. No wheezing, rhonchi or rales. Chest:      Chest wall: No tenderness. Abdominal:      General: Abdomen is flat. Bowel sounds are normal. There is no distension. Palpations: Abdomen is soft. There is no mass. Tenderness: There is abdominal tenderness (Mild discomfort) in the left lower quadrant. Hernia: No hernia is present. Musculoskeletal:         General: No swelling, tenderness, deformity or signs of injury. Normal range of motion. Cervical back: Normal range of motion and neck supple. No rigidity. No muscular tenderness. Right lower leg: No edema. Left lower leg: No edema. Lymphadenopathy:      Cervical: No cervical adenopathy. Skin:     General: Skin is warm and dry. Capillary Refill: Capillary refill takes less than 2 seconds. Coloration: Skin is not jaundiced or pale. Findings: No bruising, erythema, lesion or rash. Neurological:      General: No focal deficit present. Mental Status: She is alert and oriented to person, place, and time. Mental status is at baseline. Cranial Nerves: No cranial nerve deficit. Sensory: No sensory deficit. Motor: No weakness. Coordination: Coordination normal.      Gait: Gait normal.      Deep Tendon Reflexes: Reflexes normal.   Psychiatric:         Mood and Affect: Mood normal.         Behavior: Behavior normal.         Thought Content:  Thought content normal.         Judgment: Judgment normal.          Nohemi Kehr, MD   [address] [address] [place]  PRIMARY CARE Geeta Cassidy

## 2023-10-19 ENCOUNTER — CLINICAL SUPPORT (OUTPATIENT)
Dept: INTERNAL MEDICINE CLINIC | Facility: OTHER | Age: 28
End: 2023-10-19

## 2023-10-19 ENCOUNTER — APPOINTMENT (OUTPATIENT)
Dept: LAB | Facility: IMAGING CENTER | Age: 28
End: 2023-10-19
Payer: COMMERCIAL

## 2023-10-19 DIAGNOSIS — Z36.9 UNSPECIFIED ANTENATAL SCREENING: ICD-10-CM

## 2023-10-19 DIAGNOSIS — Z23 ENCOUNTER FOR IMMUNIZATION: Primary | ICD-10-CM

## 2023-10-19 PROCEDURE — 82105 ALPHA-FETOPROTEIN SERUM: CPT

## 2023-10-19 PROCEDURE — 36415 COLL VENOUS BLD VENIPUNCTURE: CPT

## 2023-10-21 LAB
2ND TRIMESTER 4 SCREEN SERPL-IMP: NORMAL
AFP ADJ MOM SERPL: 0.97
AFP INTERP AMN-IMP: NORMAL
AFP INTERP SERPL-IMP: NORMAL
AFP INTERP SERPL-IMP: NORMAL
AFP SERPL-MCNC: 43 NG/ML
AGE AT DELIVERY: 28.1 YR
GA METHOD: NORMAL
GA: 18 WEEKS
IDDM PATIENT QL: NO
MULTIPLE PREGNANCY: NO
NEURAL TUBE DEFECT RISK FETUS: NORMAL %

## 2023-10-26 ENCOUNTER — PATIENT MESSAGE (OUTPATIENT)
Dept: OBGYN CLINIC | Facility: CLINIC | Age: 28
End: 2023-10-26

## 2023-10-26 ENCOUNTER — APPOINTMENT (EMERGENCY)
Dept: CT IMAGING | Facility: HOSPITAL | Age: 28
End: 2023-10-26
Payer: COMMERCIAL

## 2023-10-26 ENCOUNTER — HOSPITAL ENCOUNTER (EMERGENCY)
Facility: HOSPITAL | Age: 28
Discharge: HOME | End: 2023-10-26
Attending: EMERGENCY MEDICINE
Payer: COMMERCIAL

## 2023-10-26 ENCOUNTER — HOSPITAL ENCOUNTER (OUTPATIENT)
Facility: HOSPITAL | Age: 28
Discharge: HOME | End: 2023-10-26
Attending: STUDENT IN AN ORGANIZED HEALTH CARE EDUCATION/TRAINING PROGRAM | Admitting: STUDENT IN AN ORGANIZED HEALTH CARE EDUCATION/TRAINING PROGRAM
Payer: COMMERCIAL

## 2023-10-26 VITALS
DIASTOLIC BLOOD PRESSURE: 57 MMHG | RESPIRATION RATE: 18 BRPM | TEMPERATURE: 97.9 F | SYSTOLIC BLOOD PRESSURE: 103 MMHG | OXYGEN SATURATION: 98 % | HEART RATE: 80 BPM

## 2023-10-26 VITALS
DIASTOLIC BLOOD PRESSURE: 66 MMHG | RESPIRATION RATE: 18 BRPM | HEART RATE: 81 BPM | BODY MASS INDEX: 24.86 KG/M2 | TEMPERATURE: 98.2 F | SYSTOLIC BLOOD PRESSURE: 105 MMHG | WEIGHT: 149.4 LBS

## 2023-10-26 DIAGNOSIS — G43.909 MIGRAINE HEADACHE: Primary | ICD-10-CM

## 2023-10-26 PROBLEM — Z3A.19 19 WEEKS GESTATION OF PREGNANCY: Status: ACTIVE | Noted: 2023-09-05

## 2023-10-26 PROBLEM — R51.9 PREGNANCY HEADACHE IN SECOND TRIMESTER: Status: ACTIVE | Noted: 2023-10-26

## 2023-10-26 PROBLEM — O26.892 PREGNANCY HEADACHE IN SECOND TRIMESTER: Status: ACTIVE | Noted: 2023-10-26

## 2023-10-26 LAB
ALBUMIN SERPL BCP-MCNC: 3.7 G/DL (ref 3.5–5)
ALP SERPL-CCNC: 37 U/L (ref 34–104)
ALT SERPL W P-5'-P-CCNC: 13 U/L (ref 7–52)
ANION GAP SERPL CALCULATED.3IONS-SCNC: 6 MMOL/L
AST SERPL W P-5'-P-CCNC: 14 U/L (ref 13–39)
BILIRUB SERPL-MCNC: 0.29 MG/DL (ref 0.2–1)
BUN SERPL-MCNC: 11 MG/DL (ref 5–25)
CALCIUM SERPL-MCNC: 8.6 MG/DL (ref 8.4–10.2)
CHLORIDE SERPL-SCNC: 104 MMOL/L (ref 96–108)
CO2 SERPL-SCNC: 24 MMOL/L (ref 21–32)
CREAT SERPL-MCNC: 0.5 MG/DL (ref 0.6–1.3)
CREAT UR-MCNC: 38.1 MG/DL
ERYTHROCYTE [DISTWIDTH] IN BLOOD BY AUTOMATED COUNT: 13.3 % (ref 11.6–15.1)
GFR SERPL CREATININE-BSD FRML MDRD: 133 ML/MIN/1.73SQ M
GLUCOSE SERPL-MCNC: 102 MG/DL (ref 65–140)
HCT VFR BLD AUTO: 37.2 % (ref 34.8–46.1)
HGB BLD-MCNC: 12.9 G/DL (ref 11.5–15.4)
MCH RBC QN AUTO: 31 PG (ref 26.8–34.3)
MCHC RBC AUTO-ENTMCNC: 34.7 G/DL (ref 31.4–37.4)
MCV RBC AUTO: 89 FL (ref 82–98)
PLATELET # BLD AUTO: 223 THOUSANDS/UL (ref 149–390)
PMV BLD AUTO: 11.4 FL (ref 8.9–12.7)
POTASSIUM SERPL-SCNC: 3.8 MMOL/L (ref 3.5–5.3)
PROT SERPL-MCNC: 6.7 G/DL (ref 6.4–8.4)
PROT UR-MCNC: <4 MG/DL
RBC # BLD AUTO: 4.16 MILLION/UL (ref 3.81–5.12)
SODIUM SERPL-SCNC: 134 MMOL/L (ref 135–147)
WBC # BLD AUTO: 12.97 THOUSAND/UL (ref 4.31–10.16)

## 2023-10-26 PROCEDURE — 99213 OFFICE O/P EST LOW 20 MIN: CPT

## 2023-10-26 PROCEDURE — 82570 ASSAY OF URINE CREATININE: CPT

## 2023-10-26 PROCEDURE — 80053 COMPREHEN METABOLIC PANEL: CPT

## 2023-10-26 PROCEDURE — 96374 THER/PROPH/DIAG INJ IV PUSH: CPT

## 2023-10-26 PROCEDURE — G1004 CDSM NDSC: HCPCS

## 2023-10-26 PROCEDURE — 96375 TX/PRO/DX INJ NEW DRUG ADDON: CPT

## 2023-10-26 PROCEDURE — 99283 EMERGENCY DEPT VISIT LOW MDM: CPT

## 2023-10-26 PROCEDURE — 85027 COMPLETE CBC AUTOMATED: CPT

## 2023-10-26 PROCEDURE — NC001 PR NO CHARGE: Performed by: STUDENT IN AN ORGANIZED HEALTH CARE EDUCATION/TRAINING PROGRAM

## 2023-10-26 PROCEDURE — 99284 EMERGENCY DEPT VISIT MOD MDM: CPT | Performed by: EMERGENCY MEDICINE

## 2023-10-26 PROCEDURE — 70496 CT ANGIOGRAPHY HEAD: CPT

## 2023-10-26 PROCEDURE — 84156 ASSAY OF PROTEIN URINE: CPT

## 2023-10-26 RX ORDER — HYDROXYZINE HYDROCHLORIDE 25 MG/1
25 TABLET, FILM COATED ORAL ONCE
Status: COMPLETED | OUTPATIENT
Start: 2023-10-26 | End: 2023-10-26

## 2023-10-26 RX ORDER — DEXAMETHASONE SODIUM PHOSPHATE 10 MG/ML
10 INJECTION, SOLUTION INTRAMUSCULAR; INTRAVENOUS ONCE
Status: COMPLETED | OUTPATIENT
Start: 2023-10-26 | End: 2023-10-26

## 2023-10-26 RX ORDER — ACETAMINOPHEN 325 MG/1
975 TABLET ORAL ONCE
Status: COMPLETED | OUTPATIENT
Start: 2023-10-26 | End: 2023-10-26

## 2023-10-26 RX ORDER — METOCLOPRAMIDE HYDROCHLORIDE 5 MG/ML
10 INJECTION INTRAMUSCULAR; INTRAVENOUS ONCE
Status: COMPLETED | OUTPATIENT
Start: 2023-10-26 | End: 2023-10-26

## 2023-10-26 RX ORDER — METOCLOPRAMIDE 10 MG/1
10 TABLET ORAL EVERY 6 HOURS
Qty: 30 TABLET | Refills: 0 | Status: SHIPPED | OUTPATIENT
Start: 2023-10-26

## 2023-10-26 RX ADMIN — METOCLOPRAMIDE 10 MG: 5 INJECTION, SOLUTION INTRAMUSCULAR; INTRAVENOUS at 19:27

## 2023-10-26 RX ADMIN — HYDROXYZINE HYDROCHLORIDE 25 MG: 25 TABLET ORAL at 15:56

## 2023-10-26 RX ADMIN — ACETAMINOPHEN 975 MG: 325 TABLET, FILM COATED ORAL at 15:56

## 2023-10-26 RX ADMIN — IOHEXOL 85 ML: 350 INJECTION, SOLUTION INTRAVENOUS at 18:41

## 2023-10-26 RX ADMIN — DEXAMETHASONE SODIUM PHOSPHATE 10 MG: 10 INJECTION INTRAMUSCULAR; INTRAVENOUS at 19:28

## 2023-10-26 NOTE — PATIENT COMMUNICATION
Spoke to patient she is doing as advised with no relief- advised patient of NP recommendation for further evaluation in triage.  Will TT call MD.

## 2023-10-26 NOTE — PROGRESS NOTES
L&D Triage Note - OB/GYN  Sam Malhotra 33 y.o. female MRN: [id number]  Unit/Bed#: LD TRIAGE 4-01 Encounter: [id number]      ASSESSMENT:    Sam Malhotra is a 33 y.o. Thien Lutz at 19w0d who presents for acute headache with history significant for central retinal vein occlusion, on prophylactic SQ Lovenox 40mg qd. Preeclampsia labs negative. Normotensive. No other associated symptoms. Stable for discharge to ED for further assessment. PLAN:    1) Headache in pregnancy  - Refractory to PO Tylenol 975mg and PO Atarax 25mg  - H/o central retinal vein occlusion thought secondary to OCP use  - No apparent deficits in visual fields, extraocular muscle intact with H-in-space  - No associated aura, perioral numbness, or sensory deficits  - Recommended further assessment in ED given thrombotic history and concern for possible acute intracranial process  - Consider head imaging vs. Migraine cocktail  - Discussed with ED resident  - Fetal heart tones 135bpm via abdominal Doppler probe    2) Discharge from [facility] triage with pre-term labor precautions    - Reviewed rupture of membranes, false vs true labor, decreased fetal movement, and vaginal bleeding   - Pt to call provider with any concerns and follow up at her next scheduled prenatal appointment   - Continue routine prenatal care   - Case discussed with Dr. Torey Robbins:    Sam Malhotra 33 y.o. Thien Lutz at 19w0d with an Estimated Date of Delivery: 3/21/24 who presents for worsening headache in pregnancy. Patient reports that for the past several weeks she has had an intermittent headache. History significant for prior central retinal vein occlusion in 2020 while on OCPs. She received 6 months of "eye injections" and has not had recurrence since then. While pregnant, she has been receiving prophylactic for Lovenox given history of occlusion and heterozygous MTHFR mutation.  Today, patient reports worsening band-like distribution of headache without associated numbness, aura, or neurologic deficits. We discussed differential including intracranial thrombosis, tension-type headache, or atypical migraine in pregnancy. Preeclampsia effectively ruled-out via normotensive presentation and PEC labs within normal ranges.     Contractions: no  Leakage of fluid: no  Vaginal Bleeding: no  Fetal movement: flutters    OBJECTIVE:    Vitals:    10/26/23 1522   BP: 105/66   Pulse: 81   Resp:    Temp:        ROS:  Constitutional: Negative  Respiratory: Negative  Cardiovascular: Negative    Gastrointestinal: Negative    General Physical Exam:  General: in no apparent distress and well developed and well nourished  Cardiovascular: intact distals pulses, Cor RRR  Lungs: non-labored breathing, CTA  Abdomen: abdomen is soft without significant tenderness, masses, organomegaly or guarding  Lower extremeties: nontender, b/l Ilda's sign negative    Fetal monitoring:  FHT:  135 bpm via transabdominal Doppler probe        Dmitry Mccollum MD  OBGYN PGY-3  10/26/2023 3:26 PM yes

## 2023-10-27 NOTE — ED PROVIDER NOTES
History  Chief Complaint   Patient presents with    Headache     Pt sent down from L&D triage. Pt c/o headache. Hx of blood clot in left eye. C/o on and off symptoms of headache, dizziness, and weakness. Pt given tylenol and another medication upstairs pt reports her headache went from 5-6/10 to 4/10. L&D r/o preeclampsia and sent her down for possible MRI or neuro consult. Patient is a 21-year-old pregnant female at 23 weeks gestation that presents to the emergency department from [facility] team upstairs who evaluated her for 1 month of headache which has worsened today. She also reports new lightheadedness that is been occurring today which is unusual for her. Patient has history of central retinal vein occlusion that was associated with oral contraceptive use. Patient has been evaluated by hematology oncology and has been deemed high risk for thrombosis in pregnancy and, because of this, patient has been on prophylactic Lovenox daily. She has her self injections every evening. She has been doing these injections as prescribed. She denies any vomiting but has had some nausea. She denies any vision changes at this time and otherwise feels in her usual state of health. She denies any fevers, chills, abdominal pain, chest pain, shortness of breath, weakness, injuries. Prior to Admission Medications   Prescriptions Last Dose Informant Patient Reported? Taking?    Calcium Carbonate (CALCIUM 500 PO)  Self Yes No   Sig: Take 500 mg by mouth in the morning   Ferrous Sulfate (Iron) 28 MG TABS  Self Yes No   Sig: Take by mouth   Prenatal Vit-Fe Fumarate-FA (PRENATAL VITAMIN PO)  Self Yes No   Sig: Take by mouth   Pyridoxine HCl (VITAMIN B-6 PO)  Self Yes No   Sig: Take by mouth   aspirin 81 mg chewable tablet  Self No No   Sig: Chew 2 tablets (162 mg total) daily   enoxaparin (Lovenox) 40 mg/0.4 mL  Self No No   Sig: Inject 0.4 mL (40 mg total) under the skin in the morning   folic acid (FOLVITE) 473 MCG tablet Self Yes No   Sig: Take 800 mcg by mouth daily      Facility-Administered Medications: None       Past Medical History:   Diagnosis Date    Acute sinusitis 02/14/2019    Acute suppurative otitis media of right ear without spontaneous rupture of tympanic membrane 02/15/2019    Asthma     stable on albuterol inhaler    Central retinal vein occlusion of left eye 2020    vision spared, some spots    Situational anxiety 11/13/2018    managed with no medications currently    Viral URI with cough 08/23/2018       Past Surgical History:   Procedure Laterality Date    CHOLECYSTECTOMY  12/2017    WISDOM TOOTH EXTRACTION         Family History   Problem Relation Age of Onset    Hypertension Mother     Diabetes Mother     Hyperlipidemia Mother     SRUTHI disease Mother     Hyperlipidemia Father     Asthma Father     COPD Father     Stroke Maternal Grandmother     Hypertension Maternal Grandmother     Dementia Maternal Grandmother     Prostate cancer Maternal Grandfather     Lung cancer Maternal Grandfather     Stroke Paternal Grandmother     Diabetes Paternal Grandfather     Heart disease Paternal Grandfather      I have reviewed and agree with the history as documented. E-Cigarette/Vaping    E-Cigarette Use Never User      E-Cigarette/Vaping Substances    Nicotine No     THC No     CBD No     Flavoring No     Other No     Unknown No      Social History     Tobacco Use    Smoking status: Never    Smokeless tobacco: Never   Vaping Use    Vaping Use: Never used   Substance Use Topics    Alcohol use: Not Currently    Drug use: No        Review of Systems   Constitutional:  Negative for chills and fever. HENT:  Negative for congestion, ear pain and sore throat. Eyes:  Negative for pain and visual disturbance. Respiratory:  Negative for cough and shortness of breath. Cardiovascular:  Negative for chest pain and palpitations. Gastrointestinal:  Positive for nausea (mild).  Negative for abdominal pain, constipation, diarrhea and vomiting. Genitourinary:  Negative for dysuria and hematuria. Musculoskeletal:  Negative for arthralgias and back pain. Skin:  Negative for color change and rash. Neurological:  Positive for light-headedness and headaches. Negative for dizziness, seizures and syncope. All other systems reviewed and are negative. Physical Exam  ED Triage Vitals   Temperature Pulse Respirations Blood Pressure SpO2   10/26/23 1727 10/26/23 1723 10/26/23 1723 10/26/23 1723 10/26/23 1723   97.9 °F (36.6 °C) 80 18 103/57 98 %      Temp Source Heart Rate Source Patient Position - Orthostatic VS BP Location FiO2 (%)   10/26/23 1727 10/26/23 1723 10/26/23 1723 10/26/23 1723 --   Oral Monitor Sitting Left arm       Pain Score       10/26/23 1723       4             Orthostatic Vital Signs  Vitals:    10/26/23 1723   BP: 103/57   Pulse: 80   Patient Position - Orthostatic VS: Sitting       Physical Exam  Vitals and nursing note reviewed. Constitutional:       General: She is in acute distress (Mild due to headache). Appearance: Normal appearance. She is well-developed. She is not ill-appearing. HENT:      Head: Normocephalic and atraumatic. Right Ear: External ear normal.      Left Ear: External ear normal.      Mouth/Throat:      Pharynx: Oropharynx is clear. No oropharyngeal exudate or posterior oropharyngeal erythema. Eyes:      General:         Right eye: No discharge. Left eye: No discharge. Extraocular Movements: Extraocular movements intact. Conjunctiva/sclera: Conjunctivae normal.   Cardiovascular:      Rate and Rhythm: Normal rate and regular rhythm. Pulses: Normal pulses. Heart sounds: Normal heart sounds. No murmur heard. Pulmonary:      Effort: Pulmonary effort is normal. No respiratory distress. Breath sounds: Normal breath sounds. No wheezing, rhonchi or rales. Abdominal:      General: Abdomen is flat. Palpations: Abdomen is soft. Tenderness: There is no abdominal tenderness. There is no guarding or rebound. Musculoskeletal:         General: No swelling or deformity. Normal range of motion. Cervical back: Neck supple. No tenderness. Skin:     General: Skin is warm and dry. Capillary Refill: Capillary refill takes less than 2 seconds. Neurological:      Mental Status: She is alert. ED Medications  Medications   iohexol (OMNIPAQUE) 350 MG/ML injection (MULTI-DOSE) 85 mL (85 mL Intravenous Given 10/26/23 1841)   dexamethasone (PF) (DECADRON) injection 10 mg (10 mg Intravenous Given 10/26/23 1928)   metoclopramide (REGLAN) injection 10 mg (10 mg Intravenous Given 10/26/23 1927)       Diagnostic Studies  Results Reviewed       None                   CT VENOGRAM BRAIN   Final Result by Jj Foster MD (10/26 2025)      Unremarkable CT venogram of the brain. Workstation performed: [id number]               Procedures  Procedures      ED Course                                       Medical Decision Making  28F with history of left central retinal vein occlusion presents to the emergency department with 1 month of worsening headache that developed lightheadedness and became more severe today. She was seen and evaluated by OB/GYN team upstairs who ruled out preeclampsia and noted no improvement of headache after Tylenol and Atarax and so sent her to the emergency department for evaluation. On physical exam, patient has no focal neurologic deficits and is otherwise well-appearing, however in the presence of prothrombotic history and persistent/worsening headache, will obtain CT venogram to rule out dural venous sinus thrombosis. CBC, CMP were both obtained upstairs and found to be within normal limits. After administration of Decadron and Reglan in the emergency department, patient reports resolution of migraine.   CT venogram is unremarkable leaving low suspicion for dural venous sinus thrombosis or other intracranial abnormalities. With complete resolution of headache after migraine medications, patient is appropriate for discharge home and is prescribed Reglan to be taken outpatient only as needed for migraines. In the absence of concerning findings on physical exam, laboratory evaluation, or imaging patient is appropriate for discharge at this time. Return precautions are discussed with the patient and they demonstrate understanding of the plan. The patient's questions are all answered to the their satisfaction and the patient is discharged home. Amount and/or Complexity of Data Reviewed  Radiology: ordered. Risk  Prescription drug management. Disposition  Final diagnoses:   Migraine headache     Time reflects when diagnosis was documented in both MDM as applicable and the Disposition within this note       Time User Action Codes Description Comment    10/26/2023  8:41 PM Jayden Leal Add [L36.249] Migraine headache           ED Disposition       ED Disposition   Discharge    Condition   Stable    Date/Time   Thu Oct 26, 2023  8:41 PM    Comment   Sandra Safe discharge to home/self care.                    Follow-up Information    None         Discharge Medication List as of 10/26/2023  8:43 PM        START taking these medications    Details   metoclopramide (Reglan) 10 mg tablet Take 1 tablet (10 mg total) by mouth every 6 (six) hours, Starting Thu 10/26/2023, Normal           CONTINUE these medications which have NOT CHANGED    Details   aspirin 81 mg chewable tablet Chew 2 tablets (162 mg total) daily, Starting Mon 9/11/2023, Until Thu 10/26/2023, Normal      Calcium Carbonate (CALCIUM 500 PO) Take 500 mg by mouth in the morning, Historical Med      enoxaparin (Lovenox) 40 mg/0.4 mL Inject 0.4 mL (40 mg total) under the skin in the morning, Starting Mon 9/11/2023, Until Sun 7/7/2024, Normal      Ferrous Sulfate (Iron) 28 MG TABS Take by mouth, Historical Med      folic acid (FOLVITE) 936 MCG tablet Take 800 mcg by mouth daily, Historical Med      Prenatal Vit-Fe Fumarate-FA (PRENATAL VITAMIN PO) Take by mouth, Historical Med      Pyridoxine HCl (VITAMIN B-6 PO) Take by mouth, Historical Med           No discharge procedures on file. PDMP Review       None             ED Provider  Attending physically available and evaluated Michael [place]. I managed the patient along with the ED Attending.     Electronically Signed by           Dre Fleming DO  10/26/23 5830

## 2023-10-31 ENCOUNTER — ROUTINE PRENATAL (OUTPATIENT)
Dept: OBGYN CLINIC | Facility: CLINIC | Age: 28
End: 2023-10-31
Payer: COMMERCIAL

## 2023-10-31 VITALS
SYSTOLIC BLOOD PRESSURE: 102 MMHG | WEIGHT: 157.4 LBS | BODY MASS INDEX: 26.19 KG/M2 | HEART RATE: 83 BPM | DIASTOLIC BLOOD PRESSURE: 60 MMHG

## 2023-10-31 DIAGNOSIS — O26.892 PREGNANCY HEADACHE IN SECOND TRIMESTER: ICD-10-CM

## 2023-10-31 DIAGNOSIS — Z3A.19 19 WEEKS GESTATION OF PREGNANCY: ICD-10-CM

## 2023-10-31 DIAGNOSIS — Z34.92 PRENATAL CARE IN SECOND TRIMESTER: Primary | ICD-10-CM

## 2023-10-31 DIAGNOSIS — H34.8192 CENTRAL RETINAL VEIN OCCLUSION, UNSPECIFIED COMPLICATION STATUS, UNSPECIFIED LATERALITY: ICD-10-CM

## 2023-10-31 DIAGNOSIS — R51.9 PREGNANCY HEADACHE IN SECOND TRIMESTER: ICD-10-CM

## 2023-10-31 DIAGNOSIS — Z15.89 HETEROZYGOUS FOR MTHFR GENE MUTATION: ICD-10-CM

## 2023-10-31 LAB
SL AMB  POCT GLUCOSE, UA: NEGATIVE
SL AMB POCT URINE PROTEIN: NEGATIVE

## 2023-10-31 PROCEDURE — 81002 URINALYSIS NONAUTO W/O SCOPE: CPT | Performed by: PHYSICIAN ASSISTANT

## 2023-10-31 PROCEDURE — PNV: Performed by: PHYSICIAN ASSISTANT

## 2023-10-31 RX ORDER — ASPIRIN 81 MG/1
162 TABLET, CHEWABLE ORAL DAILY
Qty: 90 TABLET | Refills: 1 | Status: CANCELLED | OUTPATIENT
Start: 2023-10-31 | End: 2024-04-28

## 2023-10-31 NOTE — PROGRESS NOTES
Patient here for prenatal visit. GA: 19w5d    Denies leaking of fluid, bleeding, cramping  Normal Fetal Movement  Urine is? Prenatal Labs-UTD  AFP: Negative  NIPS: Low Risk  Flu Vaccine-UTD  Patient was in triage/ER due to headaches. Had a cat scan that came back clear.

## 2023-11-01 NOTE — ASSESSMENT & PLAN NOTE
Matt Sal  is a 33 y.o.  @19w6d who presents for routine prenatal visit. Denies OB complaints. Leaving for [facility] tomorrow. Advised compression socks and ambulation while on plane for DVT and PE prevention    NIPT- low risk   MASFP- low risk  Level II scheduled     Good fetal movement. Denies contractions, cramping, leakage of fluid or vaginal bleeding. Reviewed PTL precautions and reasons to call.

## 2023-11-01 NOTE — ASSESSMENT & PLAN NOTE
Pt was in ED last week for worsening HA in pregnancy  Due to hx of central retinal vein occlusion a CT venogram was performed and unremarkable   She was given rx for Reglan which has been helping her HA  We discussed option of daily magnesium supplement for HA management in pregnancy which she will trial

## 2023-11-01 NOTE — PROGRESS NOTES
19 weeks gestation of pregnancy  Aurora Elmore  is a 33 y.o. Thien Lutz @19w6d who presents for routine prenatal visit. Denies OB complaints. Leaving for [facility] tomorrow. Advised compression socks and ambulation while on plane for DVT and PE prevention    NIPT- low risk   MASFP- low risk  Level II scheduled     Good fetal movement. Denies contractions, cramping, leakage of fluid or vaginal bleeding. Reviewed PTL precautions and reasons to call.         Central retinal vein occlusion  She is managed on Lovenox 40 mg QD  Has Level 2 scheduled    Heterozygous for MTHFR gene mutation  Managed on Lovenox 40 mg QD    Pregnancy headache in second trimester  Pt was in ED last week for worsening HA in pregnancy  Due to hx of central retinal vein occlusion a CT venogram was performed and unremarkable   She was given rx for Reglan which has been helping her HA  We discussed option of daily magnesium supplement for HA management in pregnancy which she will trial

## 2023-11-06 ENCOUNTER — ROUTINE PRENATAL (OUTPATIENT)
Facility: HOSPITAL | Age: 28
End: 2023-11-06
Payer: COMMERCIAL

## 2023-11-06 VITALS
WEIGHT: 160.4 LBS | HEART RATE: 75 BPM | BODY MASS INDEX: 26.73 KG/M2 | HEIGHT: 65 IN | DIASTOLIC BLOOD PRESSURE: 54 MMHG | SYSTOLIC BLOOD PRESSURE: 90 MMHG

## 2023-11-06 DIAGNOSIS — Z36.86 ENCOUNTER FOR ANTENATAL SCREENING FOR CERVICAL LENGTH: Primary | ICD-10-CM

## 2023-11-06 DIAGNOSIS — Z36.3 ENCOUNTER FOR ANTENATAL SCREENING FOR MALFORMATION: ICD-10-CM

## 2023-11-06 DIAGNOSIS — Z3A.20 20 WEEKS GESTATION OF PREGNANCY: ICD-10-CM

## 2023-11-06 PROCEDURE — 76817 TRANSVAGINAL US OBSTETRIC: CPT | Performed by: STUDENT IN AN ORGANIZED HEALTH CARE EDUCATION/TRAINING PROGRAM

## 2023-11-06 PROCEDURE — 76805 OB US >/= 14 WKS SNGL FETUS: CPT | Performed by: STUDENT IN AN ORGANIZED HEALTH CARE EDUCATION/TRAINING PROGRAM

## 2023-11-06 PROCEDURE — 99213 OFFICE O/P EST LOW 20 MIN: CPT | Performed by: STUDENT IN AN ORGANIZED HEALTH CARE EDUCATION/TRAINING PROGRAM

## 2023-11-06 NOTE — PROGRESS NOTES
[address]: Ms. Lisa Duggan was seen today for anatomic survey and cervical length screening ultrasound. See ultrasound report under "OB Procedures" tab. The time spent on this established patient on the encounter date included 5 minutes previsit service time reviewing records and precharting, 5 minutes face-to-face service time counseling regarding results and coordinating care, and  5 minutes charting, totalling 15 minutes. Please don't hesitate to contact our office with any concerns or questions.   -Grayson Schreiber MD

## 2023-11-30 ENCOUNTER — ROUTINE PRENATAL (OUTPATIENT)
Dept: OBGYN CLINIC | Facility: CLINIC | Age: 28
End: 2023-11-30
Payer: COMMERCIAL

## 2023-11-30 VITALS
SYSTOLIC BLOOD PRESSURE: 100 MMHG | WEIGHT: 164.8 LBS | HEART RATE: 82 BPM | DIASTOLIC BLOOD PRESSURE: 50 MMHG | BODY MASS INDEX: 27.42 KG/M2

## 2023-11-30 DIAGNOSIS — H34.8192 CENTRAL RETINAL VEIN OCCLUSION, UNSPECIFIED COMPLICATION STATUS, UNSPECIFIED LATERALITY: ICD-10-CM

## 2023-11-30 DIAGNOSIS — R51.9 PREGNANCY HEADACHE IN SECOND TRIMESTER: ICD-10-CM

## 2023-11-30 DIAGNOSIS — Z34.92 PRENATAL CARE IN SECOND TRIMESTER: Primary | ICD-10-CM

## 2023-11-30 DIAGNOSIS — O26.892 PREGNANCY HEADACHE IN SECOND TRIMESTER: ICD-10-CM

## 2023-11-30 DIAGNOSIS — Z3A.24 24 WEEKS GESTATION OF PREGNANCY: ICD-10-CM

## 2023-11-30 DIAGNOSIS — J45.20 MILD INTERMITTENT ASTHMA WITHOUT COMPLICATION: ICD-10-CM

## 2023-11-30 PROBLEM — N63.0 BREAST NODULE: Status: RESOLVED | Noted: 2019-02-18 | Resolved: 2023-11-30

## 2023-11-30 PROBLEM — J30.9 ALLERGIC RHINITIS: Status: RESOLVED | Noted: 2018-10-03 | Resolved: 2023-11-30

## 2023-11-30 PROBLEM — R10.32 BILATERAL LOWER ABDOMINAL DISCOMFORT: Status: RESOLVED | Noted: 2023-10-16 | Resolved: 2023-11-30

## 2023-11-30 PROBLEM — M54.6 ACUTE LEFT-SIDED THORACIC BACK PAIN: Status: RESOLVED | Noted: 2022-10-14 | Resolved: 2023-11-30

## 2023-11-30 PROBLEM — F41.1 GENERALIZED ANXIETY DISORDER: Status: RESOLVED | Noted: 2018-11-13 | Resolved: 2023-11-30

## 2023-11-30 PROBLEM — Z00.00 ANNUAL PHYSICAL EXAM: Status: RESOLVED | Noted: 2021-09-28 | Resolved: 2023-11-30

## 2023-11-30 PROBLEM — R10.31 BILATERAL LOWER ABDOMINAL DISCOMFORT: Status: RESOLVED | Noted: 2023-10-16 | Resolved: 2023-11-30

## 2023-11-30 LAB
SL AMB  POCT GLUCOSE, UA: NEGATIVE
SL AMB POCT URINE PROTEIN: NEGATIVE

## 2023-11-30 PROCEDURE — 99213 OFFICE O/P EST LOW 20 MIN: CPT | Performed by: OBSTETRICS & GYNECOLOGY

## 2023-11-30 PROCEDURE — 81002 URINALYSIS NONAUTO W/O SCOPE: CPT | Performed by: OBSTETRICS & GYNECOLOGY

## 2023-11-30 NOTE — PROGRESS NOTES
Prenatal visit @ 24w0d. Denies ctxs, VB, LOF.  +FM. Problem List Items Addressed This Visit       Mild intermittent asthma without complication     Patient reports symptoms as a child and then outgrew them as she entered adulthood and lost weight. Patient denies symptoms for years. Declines albuterol inhaler - will call if she needs one. Central retinal vein occlusion     Believed to have been provoked by OCP use. On Lovenox 40mg QD. Discussed plan to stop at 39 weeks and time induction to facilitate neuraxial anesthesia. 24 weeks gestation of pregnancy     NIPT and MSAFP low risk. Nl level 2 but some limited view. Has 32 week growth scan. 28 week labs given and explained. S/p flu vaccine.  labor precautions reviewed. Pregnancy headache in second trimester     Doing better with reglan and magnesium supplementation.            Other Visit Diagnoses       Prenatal care in second trimester    -  Primary    Relevant Orders    POCT urine dip    RPR-Syphilis Screening (Total Syphilis IGG/IGM)    Glucose, 1H PG    CBC and differential    Anemia Panel w/Reflex, OB

## 2023-12-01 NOTE — ASSESSMENT & PLAN NOTE
Patient reports symptoms as a child and then outgrew them as she entered adulthood and lost weight. Patient denies symptoms for years. Declines albuterol inhaler - will call if she needs one.

## 2023-12-01 NOTE — ASSESSMENT & PLAN NOTE
Believed to have been provoked by OCP use. On Lovenox 40mg QD. Discussed plan to stop at 39 weeks and time induction to facilitate neuraxial anesthesia.

## 2023-12-01 NOTE — ASSESSMENT & PLAN NOTE
NIPT and MSAFP low risk. Nl level 2 but some limited view. Has 32 week growth scan. 28 week labs given and explained. S/p flu vaccine.  labor precautions reviewed.

## 2023-12-04 ENCOUNTER — OFFICE VISIT (OUTPATIENT)
Age: 28
End: 2023-12-04
Payer: COMMERCIAL

## 2023-12-04 VITALS
HEART RATE: 85 BPM | BODY MASS INDEX: 27.32 KG/M2 | WEIGHT: 164 LBS | HEIGHT: 65 IN | DIASTOLIC BLOOD PRESSURE: 60 MMHG | SYSTOLIC BLOOD PRESSURE: 106 MMHG | OXYGEN SATURATION: 97 %

## 2023-12-04 DIAGNOSIS — M54.6 RIGHT-SIDED THORACIC BACK PAIN, UNSPECIFIED CHRONICITY: ICD-10-CM

## 2023-12-04 DIAGNOSIS — M99.03 SEGMENTAL DYSFUNCTION OF LUMBAR REGION: ICD-10-CM

## 2023-12-04 DIAGNOSIS — M99.02 SEGMENTAL DYSFUNCTION OF THORACIC REGION: ICD-10-CM

## 2023-12-04 DIAGNOSIS — M99.01 SEGMENTAL DYSFUNCTION OF CERVICAL REGION: Primary | ICD-10-CM

## 2023-12-04 DIAGNOSIS — M54.59 MECHANICAL LOW BACK PAIN: ICD-10-CM

## 2023-12-04 DIAGNOSIS — M99.04 SEGMENTAL DYSFUNCTION OF SACRAL REGION: ICD-10-CM

## 2023-12-04 PROCEDURE — 99203 OFFICE O/P NEW LOW 30 MIN: CPT | Performed by: CHIROPRACTOR

## 2023-12-04 PROCEDURE — 98942 CHIROPRACTIC MANJ 5 REGIONS: CPT | Performed by: CHIROPRACTOR

## 2023-12-04 NOTE — PROGRESS NOTES
Diagnoses and all orders for this visit:    Segmental dysfunction of cervical region    Segmental dysfunction of lumbar region    Mechanical low back pain    Segmental dysfunction of thoracic region    Segmental dysfunction of sacral region    Right-sided thoracic back pain, unspecified chronicity      ASSESSMENT:  No red flags, radiculopathy or neurologic deficit appreciated clinically. Pt's symptoms and exam findings consistent with mechanical lower back and mid back pain secondary to physical stressors of pregnancy and repetitive st/sp injury, exacerbated by postural/ergonomic stressors. Pt responded well to stretches and manual mobilization of the affected spinal and myofascial tissues with increased ROM; trial of conservative tx recommended consisting of stretching, ther-ex, graded mobilization/manipulation of the affected spinal/myofascial tissues, postural/ergonomic education and take home stretches/exercises. If symptoms fail to improve with short trial of conservative care, appropriate imaging and referral will be coordinated. Spent greater than 30 min c pt discussing hx, pe, ddx, tx options and reviewing notes/imaging    PROCEDURE CODES: 99601, [id number]    TREATMENT:  Fear avoidance behavior discussion, encouraged and reassured pt that natural course of condition is to improve over time with adherence to tx plan and home care strategies. Home care recommendations: avoid bed rest, walk (but avoid trails and uneven surfaces), gradual return to activity to tolerance (avoid anything that peripheralizes symptoms), ice 20 min on/off, watch for ice burn, call if symptoms peripheralize, worsen, or neurologic deficit progresses.  Ther-ex: IASTM - discussed post procedure soreness and/or ecchymosis for up to 36 hrs, applied to affected mm hypertonicities; wall cheryl, axial retraction, upper trap stretch, lev scap stretch, SCM stretch, lat rows with t-band, lat pull down with t-band, abdominal bracing; greater than 15 min spent performing above mentioned ther-ex. Thoracic mobilization/manipulation: prone P-A mob, supine A-P manip; cervical mobilization/manipulation: diversified supine graded mobilization, cervical traction, Lumbar side lying mobilization, hip distraction- long axis    HPI:  Oneyda Gómez is a 33 y.o. female   Chief Complaint   Patient presents with   • Neck Pain     Neck pain-7   • Back Pain     Middle back pain-7  Lower back pain-7     The patient reports that she is feeling typical lower neck and into the mid back pain she mentions previous treatment was chiropractic adjustment or myofascial release but it was short lived. The patient also self treatment with tennis ball. Exercise routine- stretching and yoga at home- concentrating on stretches, Today she is 24 weeks pregnant. Pt  at a green house. The patient reports there is no heavy lifting, but at times a lot of walking. Neck Pain   Associated symptoms include headaches. Pertinent negatives include no chest pain, fever, numbness or weakness. Back Pain  Associated symptoms include headaches. Pertinent negatives include no chest pain, fever, numbness or weakness. Past Medical History:   Diagnosis Date   • Acute sinusitis 02/14/2019   • Acute suppurative otitis media of right ear without spontaneous rupture of tympanic membrane 02/15/2019   • Asthma     stable on albuterol inhaler   • Central retinal vein occlusion of left eye 2020    vision spared, some spots   • Situational anxiety 11/13/2018    managed with no medications currently   • Viral URI with cough 08/23/2018      The following portions of the patient's history were reviewed and updated as appropriate: allergies, past family history, past medical history, past social history, past surgical history, and problem list.  Review of Systems   Constitutional:  Negative for activity change, fatigue, fever and unexpected weight change.    HENT:  Negative for ear pain, hearing loss, sinus pressure, sinus pain, sore throat and tinnitus. Respiratory:  Negative for chest tightness, shortness of breath, wheezing and stridor. Cardiovascular:  Negative for chest pain. Genitourinary:  Negative for flank pain and frequency. Musculoskeletal:  Positive for back pain and neck pain. Negative for joint swelling and neck stiffness. Skin:  Negative for color change and pallor. Neurological:  Positive for headaches. Negative for dizziness, speech difficulty, weakness and numbness. Psychiatric/Behavioral:  Negative for agitation and sleep disturbance. The patient is not nervous/anxious. Physical Exam  Constitutional:       General: She is not in acute distress. Appearance: Normal appearance. HENT:      Head: Normocephalic. Mouth/Throat:      Mouth: Mucous membranes are moist.   Eyes:      Extraocular Movements: Extraocular movements intact. Conjunctiva/sclera: Conjunctivae normal.      Pupils: Pupils are equal, round, and reactive to light. Neck:      Vascular: No carotid bruit. Pulmonary:      Effort: Pulmonary effort is normal.   Chest:      Chest wall: No tenderness. Abdominal:      General: Abdomen is flat. Palpations: Abdomen is soft. Musculoskeletal:         General: Tenderness present. No swelling, deformity or signs of injury. Cervical back: Rigidity, spasms and tenderness present. No swelling, edema, deformity, erythema, signs of trauma, lacerations, torticollis, bony tenderness or crepitus. Pain with movement present. Decreased range of motion. Thoracic back: Spasms present. No swelling, edema, deformity, signs of trauma, lacerations or bony tenderness. Decreased range of motion. No scoliosis. Lumbar back: Spasms present. No swelling, edema, deformity, signs of trauma, lacerations or bony tenderness. Decreased range of motion. Negative right straight leg raise test. No scoliosis. Back:       Right lower leg: No edema.       Left lower leg: No edema. Lymphadenopathy:      Cervical: No cervical adenopathy. Skin:     General: Skin is warm. Coloration: Skin is not jaundiced or pale. Findings: No bruising or erythema. Neurological:      Mental Status: She is alert and oriented to person, place, and time. Cranial Nerves: No cranial nerve deficit. Sensory: No sensory deficit. Motor: No weakness. Gait: Gait is intact. Deep Tendon Reflexes: Reflexes are normal and symmetric. Psychiatric:         Attention and Perception: Attention normal.         Mood and Affect: Mood and affect normal.         Speech: Speech normal.         Behavior: Behavior normal. Behavior is cooperative. Thought Content: Thought content normal.         Cognition and Memory: Cognition normal.         Judgment: Judgment normal.       SOFT TISSUE ASSESSMENT: Hypertonicity and tenderness palpated C5-T6 T9-12, L2-S1, MAGDI SIJ erector spinae, upper traps, lev scap, SCM, scalene, rhomboid, suboccipitals, QL R, Glute, piriformis- R, Middle/lower trap R JOINT RECTRICTIONS: C5-T6, T9-12, L2-S1, MAGDI SIJORTHO: Renetta unremarkable for centralization/peripheralization; max foraminal comp elicits local np R/L; shoulder depression elicits stiffness in R/L upper trap; brachial plexus tension test elicits neural tension in R/L UE; cervical distraction elicits relieves CC, SLUMP- Neg, SLR- Neg, Fabere- positive SIJ pain    Return in about 1 week (around 12/11/2023).

## 2023-12-15 ENCOUNTER — TELEPHONE (OUTPATIENT)
Dept: OBGYN CLINIC | Facility: CLINIC | Age: 28
End: 2023-12-15

## 2023-12-15 NOTE — TELEPHONE ENCOUNTER
Called pt- pt has +   FM, denies LOF, VB, pelvic pain, cxtns. J"ust noted some light brown spotting this am." Denies having had IC or recent pelvic exam.  Advised to monitor for now, & pelvic rest for a few days. Will call if any change in symptoms.

## 2023-12-16 ENCOUNTER — APPOINTMENT (OUTPATIENT)
Dept: LAB | Facility: IMAGING CENTER | Age: 28
End: 2023-12-16
Payer: COMMERCIAL

## 2023-12-16 DIAGNOSIS — Z34.92 PRENATAL CARE IN SECOND TRIMESTER: ICD-10-CM

## 2023-12-16 LAB
BASOPHILS # BLD AUTO: 0.06 THOUSANDS/ÂΜL (ref 0–0.1)
BASOPHILS NFR BLD AUTO: 1 % (ref 0–1)
EOSINOPHIL # BLD AUTO: 0.07 THOUSAND/ÂΜL (ref 0–0.61)
EOSINOPHIL NFR BLD AUTO: 1 % (ref 0–6)
ERYTHROCYTE [DISTWIDTH] IN BLOOD BY AUTOMATED COUNT: 13.5 % (ref 11.6–15.1)
GLUCOSE 1H P 50 G GLC PO SERPL-MCNC: 119 MG/DL (ref 40–134)
HCT VFR BLD AUTO: 37.6 % (ref 34.8–46.1)
HGB BLD-MCNC: 12.5 G/DL (ref 11.5–15.4)
IMM GRANULOCYTES # BLD AUTO: 0.16 THOUSAND/UL (ref 0–0.2)
IMM GRANULOCYTES NFR BLD AUTO: 1 % (ref 0–2)
LYMPHOCYTES # BLD AUTO: 1.78 THOUSANDS/ÂΜL (ref 0.6–4.47)
LYMPHOCYTES NFR BLD AUTO: 16 % (ref 14–44)
MCH RBC QN AUTO: 31.6 PG (ref 26.8–34.3)
MCHC RBC AUTO-ENTMCNC: 33.2 G/DL (ref 31.4–37.4)
MCV RBC AUTO: 95 FL (ref 82–98)
MONOCYTES # BLD AUTO: 0.58 THOUSAND/ÂΜL (ref 0.17–1.22)
MONOCYTES NFR BLD AUTO: 5 % (ref 4–12)
NEUTROPHILS # BLD AUTO: 8.65 THOUSANDS/ÂΜL (ref 1.85–7.62)
NEUTS SEG NFR BLD AUTO: 76 % (ref 43–75)
NRBC BLD AUTO-RTO: 0 /100 WBCS
PLATELET # BLD AUTO: 204 THOUSANDS/UL (ref 149–390)
PMV BLD AUTO: 12.6 FL (ref 8.9–12.7)
RBC # BLD AUTO: 3.95 MILLION/UL (ref 3.81–5.12)
WBC # BLD AUTO: 11.3 THOUSAND/UL (ref 4.31–10.16)

## 2023-12-16 PROCEDURE — 85025 COMPLETE CBC W/AUTO DIFF WBC: CPT

## 2023-12-16 PROCEDURE — 86780 TREPONEMA PALLIDUM: CPT

## 2023-12-16 PROCEDURE — 36415 COLL VENOUS BLD VENIPUNCTURE: CPT

## 2023-12-16 PROCEDURE — 82950 GLUCOSE TEST: CPT

## 2023-12-17 LAB — TREPONEMA PALLIDUM IGG+IGM AB [PRESENCE] IN SERUM OR PLASMA BY IMMUNOASSAY: NORMAL

## 2023-12-22 ENCOUNTER — PROCEDURE VISIT (OUTPATIENT)
Age: 28
End: 2023-12-22
Payer: COMMERCIAL

## 2023-12-22 VITALS
HEIGHT: 65 IN | HEART RATE: 91 BPM | SYSTOLIC BLOOD PRESSURE: 104 MMHG | OXYGEN SATURATION: 99 % | DIASTOLIC BLOOD PRESSURE: 66 MMHG | WEIGHT: 164 LBS | BODY MASS INDEX: 27.32 KG/M2

## 2023-12-22 DIAGNOSIS — M54.59 MECHANICAL LOW BACK PAIN: ICD-10-CM

## 2023-12-22 DIAGNOSIS — M99.04 SEGMENTAL DYSFUNCTION OF SACRAL REGION: ICD-10-CM

## 2023-12-22 DIAGNOSIS — M99.02 SEGMENTAL DYSFUNCTION OF THORACIC REGION: ICD-10-CM

## 2023-12-22 DIAGNOSIS — M54.6 RIGHT-SIDED THORACIC BACK PAIN, UNSPECIFIED CHRONICITY: ICD-10-CM

## 2023-12-22 DIAGNOSIS — M99.01 SEGMENTAL DYSFUNCTION OF CERVICAL REGION: ICD-10-CM

## 2023-12-22 DIAGNOSIS — M99.03 SEGMENTAL DYSFUNCTION OF LUMBAR REGION: Primary | ICD-10-CM

## 2023-12-22 PROCEDURE — 98941 CHIROPRACT MANJ 3-4 REGIONS: CPT | Performed by: CHIROPRACTOR

## 2023-12-22 NOTE — PROGRESS NOTES
Diagnoses and all orders for this visit:    Segmental dysfunction of lumbar region    Mechanical low back pain    Segmental dysfunction of cervical region    Segmental dysfunction of thoracic region    Segmental dysfunction of sacral region    Right-sided thoracic back pain, unspecified chronicity      ASSESSMENT:   Pt's symptoms and exam findings consistent with mechanical lower back and mid back pain secondary to physical stressors of pregnancy and repetitive st/sp injury, exacerbated by postural/ergonomic stressors. Pt responded well to stretches and manual mobilization of the affected spinal and myofascial tissues with increased ROM; trial of conservative tx recommended consisting of stretching, ther-ex, graded mobilization/manipulation of the affected spinal/myofascial tissues, postural/ergonomic education and take home stretches/exercises. If symptoms fail to improve with short trial of conservative care, appropriate imaging and referral will be coordinated.  - Tolerating treatment well with decrease in pain.     PROCEDURE CODES: 36610    TREATMENT:  Fear avoidance behavior discussion, encouraged and reassured pt that natural course of condition is to improve over time with adherence to tx plan and home care strategies. Home care recommendations: avoid bed rest, walk (but avoid trails and uneven surfaces), gradual return to activity to tolerance (avoid anything that peripheralizes symptoms), ice 20 min on/off, watch for ice burn, call if symptoms peripheralize, worsen, or neurologic deficit progresses. Ther-ex: IASTM - discussed post procedure soreness and/or ecchymosis for up to 36 hrs, applied to affected mm hypertonicities; wall cheryl, axial retraction, upper trap stretch, lev scap stretch, SCM stretch, lat rows with t-band, lat pull down with t-band, abdominal bracing; greater than 15 min spent performing above mentioned ther-ex. Thoracic mobilization/manipulation: prone P-A mob, supine A-P manip; cervical  mobilization/manipulation: diversified supine graded mobilization, cervical traction, Lumbar side lying mobilization, hip distraction- long axis    HPI:  Sanchez Shields is a 28 y.o. female   Chief Complaint   Patient presents with   • Neck Pain     Neck pain-4   • Back Pain     Middle back pain-4  Lower back pain-4     The patient reports that she is feeling typical lower neck and into the mid back pain she mentions previous treatment was chiropractic adjustment or myofascial release but it was short lived. The patient also self treatment with tennis ball. Exercise routine- stretching and yoga at home- concentrating on stretches, Today she is 24 weeks pregnant. Pt  at a [employer]. The patient reports there is no heavy lifting, but at times a lot of walking.  12/22- The patient is feeling ok. The patient reports she has been doing the exercises.    Neck Pain   Associated symptoms include headaches.   Back Pain  Associated symptoms include headaches.     Past Medical History:   Diagnosis Date   • Acute sinusitis 02/14/2019   • Acute suppurative otitis media of right ear without spontaneous rupture of tympanic membrane 02/15/2019   • Asthma     stable on albuterol inhaler   • Central retinal vein occlusion of left eye 2020    vision spared, some spots   • Situational anxiety 11/13/2018    managed with no medications currently   • Viral URI with cough 08/23/2018      The following portions of the patient's history were reviewed and updated as appropriate: allergies, past family history, past medical history, past social history, past surgical history, and problem list.  Review of Systems   Musculoskeletal:  Positive for back pain and neck pain.   Neurological:  Positive for headaches.     Physical Exam  Musculoskeletal:         General: Tenderness present.      Cervical back: Rigidity, spasms and tenderness present. No swelling, edema, deformity, erythema, signs of trauma, lacerations, torticollis, bony  tenderness or crepitus. Pain with movement present. Decreased range of motion.      Thoracic back: Spasms present. No swelling, edema, deformity, signs of trauma, lacerations or bony tenderness. Decreased range of motion. No scoliosis.      Lumbar back: Spasms present. No swelling, edema, deformity, signs of trauma, lacerations or bony tenderness. Decreased range of motion. Negative right straight leg raise test. No scoliosis.        Back:        SOFT TISSUE ASSESSMENT: Hypertonicity and tenderness palpated C5-T6 T9-12, L2-S1, MAGDI SIJ erector spinae, upper traps, lev scap, SCM, scalene, rhomboid, suboccipitals, QL R, Glute, piriformis- R, Middle/lower trap R JOINT RECTRICTIONS: C5-T6, T9-12, L2-S1, MAGDI SIJORTHO: Renetta unremarkable for centralization/peripheralization; max foraminal comp elicits local np R/L; shoulder depression elicits stiffness in R/L upper trap; brachial plexus tension test elicits neural tension in R/L UE; cervical distraction elicits relieves CC, SLUMP- Neg, SLR- Neg, Fabere- positive SIJ pain    Return in about 1 week (around 12/29/2023) for Recheck.

## 2023-12-27 ENCOUNTER — ROUTINE PRENATAL (OUTPATIENT)
Dept: OBGYN CLINIC | Facility: CLINIC | Age: 28
End: 2023-12-27
Payer: COMMERCIAL

## 2023-12-27 VITALS
BODY MASS INDEX: 28.92 KG/M2 | DIASTOLIC BLOOD PRESSURE: 64 MMHG | SYSTOLIC BLOOD PRESSURE: 106 MMHG | HEART RATE: 86 BPM | WEIGHT: 173.8 LBS

## 2023-12-27 DIAGNOSIS — Z3A.28 28 WEEKS GESTATION OF PREGNANCY: ICD-10-CM

## 2023-12-27 DIAGNOSIS — Z34.82 PRENATAL CARE, SUBSEQUENT PREGNANCY, SECOND TRIMESTER: Primary | ICD-10-CM

## 2023-12-27 DIAGNOSIS — F41.9 ANXIETY DURING PREGNANCY: ICD-10-CM

## 2023-12-27 DIAGNOSIS — O99.340 ANXIETY DURING PREGNANCY: ICD-10-CM

## 2023-12-27 DIAGNOSIS — H34.8192 CENTRAL RETINAL VEIN OCCLUSION, UNSPECIFIED COMPLICATION STATUS, UNSPECIFIED LATERALITY: ICD-10-CM

## 2023-12-27 LAB
SL AMB  POCT GLUCOSE, UA: ABNORMAL
SL AMB POCT URINE PROTEIN: ABNORMAL

## 2023-12-27 PROCEDURE — 99213 OFFICE O/P EST LOW 20 MIN: CPT | Performed by: STUDENT IN AN ORGANIZED HEALTH CARE EDUCATION/TRAINING PROGRAM

## 2023-12-27 PROCEDURE — 81002 URINALYSIS NONAUTO W/O SCOPE: CPT | Performed by: STUDENT IN AN ORGANIZED HEALTH CARE EDUCATION/TRAINING PROGRAM

## 2023-12-27 NOTE — PROGRESS NOTES
28 weeks gestation of pregnancy  27yo  at 27.6wks presents for routine prenatal visit     Pt denies contractions, vaginal bleeding, leakage of fluid. Endorses fetal movement.     Patient was counseled about the labor process. Patient was counseled about possible need for operative delivery via vacuum or forceps. Indications for operative delivery discussed. Risks dicussed including but not limited to increased maternal risk of more severe laceration and small risk of  complications of intracranial hemorrhage, lacerations, nerve damage or fracture.     Discussed with patient potential indications of need for  section including arrest of dilation/descent, non-reassuring fetal status, or worsening maternal status. Risks of  section discussed including but not limited to infection, bleeding, injury to the surrounding organs including bowel and bladder. Medical and surgical management of post partum bleeding discussed. Risk of blood transfusion discussed, patient okay with receiving blood transfusion if necessary.     Patient verbalized understanding. All questions answered. Patient signed consent.     -continue PNVs   -28wk labs reviewed, wnl   -repeat growth 24  -s/p Flu vaccine, Tdap next visit   -delivery consent signed today   - labor precautions provided  -return in 3 weeks     Anxiety during pregnancy  -pt tearful on exam as she has a lot going on at home.  is still looking for a new job. She lives with her parents and grandmother who has dementia. She has had two panic attacks in the last two weeks.   -pt has good support from . They both work on breathing exercises together to help with anxiety. She has previously been on Lexapro but did not experience relief. She has previously done well with talk therapy, and has been using a lot of techniques she's previously learned to help with her anxiety.   -referral to baby and me placed    Central retinal vein  occlusion  -likely provoked from OCP use   -heterozygous for MTHFR gene mutation   -continue lovenox 40mg daily   -will likely schedule IOL at 39wks to allow for epidural

## 2023-12-27 NOTE — ASSESSMENT & PLAN NOTE
-pt tearful on exam as she has a lot going on at home.  is still looking for a new job. She lives with her parents and grandmother who has dementia. She has had two panic attacks in the last two weeks.   -pt has good support from . They both work on breathing exercises together to help with anxiety. She has previously been on Lexapro but did not experience relief. She has previously done well with talk therapy, and has been using a lot of techniques she's previously learned to help with her anxiety.   -referral to baby and me placed

## 2023-12-27 NOTE — ASSESSMENT & PLAN NOTE
27yo  at 27.6wks presents for routine prenatal visit     Pt denies contractions, vaginal bleeding, leakage of fluid. Endorses fetal movement.     Patient was counseled about the labor process. Patient was counseled about possible need for operative delivery via vacuum or forceps. Indications for operative delivery discussed. Risks dicussed including but not limited to increased maternal risk of more severe laceration and small risk of  complications of intracranial hemorrhage, lacerations, nerve damage or fracture.     Discussed with patient potential indications of need for  section including arrest of dilation/descent, non-reassuring fetal status, or worsening maternal status. Risks of  section discussed including but not limited to infection, bleeding, injury to the surrounding organs including bowel and bladder. Medical and surgical management of post partum bleeding discussed. Risk of blood transfusion discussed, patient okay with receiving blood transfusion if necessary.     Patient verbalized understanding. All questions answered. Patient signed consent.     -continue PNVs   -28wk labs reviewed, wnl   -repeat growth 24  -s/p Flu vaccine, Tdap next visit   -delivery consent signed today   - labor precautions provided  -return in 3 weeks

## 2023-12-27 NOTE — ASSESSMENT & PLAN NOTE
-likely provoked from OCP use   -heterozygous for MTHFR gene mutation   -continue lovenox 40mg daily   -will likely schedule IOL at 39wks to allow for epidural

## 2024-01-10 ENCOUNTER — ROUTINE PRENATAL (OUTPATIENT)
Dept: OBGYN CLINIC | Facility: CLINIC | Age: 29
End: 2024-01-10
Payer: COMMERCIAL

## 2024-01-10 VITALS
SYSTOLIC BLOOD PRESSURE: 108 MMHG | BODY MASS INDEX: 29.29 KG/M2 | HEART RATE: 70 BPM | DIASTOLIC BLOOD PRESSURE: 68 MMHG | WEIGHT: 176 LBS

## 2024-01-10 DIAGNOSIS — Z23 ENCOUNTER FOR IMMUNIZATION: ICD-10-CM

## 2024-01-10 DIAGNOSIS — Z34.93 PRENATAL CARE IN THIRD TRIMESTER: Primary | ICD-10-CM

## 2024-01-10 DIAGNOSIS — Z23 NEED FOR TDAP VACCINATION: ICD-10-CM

## 2024-01-10 DIAGNOSIS — Z3A.29 29 WEEKS GESTATION OF PREGNANCY: ICD-10-CM

## 2024-01-10 DIAGNOSIS — F41.9 ANXIETY DURING PREGNANCY: ICD-10-CM

## 2024-01-10 DIAGNOSIS — O99.340 ANXIETY DURING PREGNANCY: ICD-10-CM

## 2024-01-10 DIAGNOSIS — H34.8192 STABLE CENTRAL RETINAL VEIN OCCLUSION, UNSPECIFIED LATERALITY: ICD-10-CM

## 2024-01-10 PROCEDURE — 90471 IMMUNIZATION ADMIN: CPT

## 2024-01-10 PROCEDURE — 99213 OFFICE O/P EST LOW 20 MIN: CPT | Performed by: PHYSICIAN ASSISTANT

## 2024-01-10 PROCEDURE — 90715 TDAP VACCINE 7 YRS/> IM: CPT

## 2024-01-10 NOTE — ASSESSMENT & PLAN NOTE
Increasing anxiety and panic attack in pregnancy  She has an appointment with therapist tomorrow   Reviewed option of medication initiation if symptoms are not manageable with therapy alone

## 2024-01-10 NOTE — PROGRESS NOTES
29w6d.  A positive  Denies cramping, bleeding, leakage of fluid. Normal fetal movement.  Delivery consent signed previous visit   Repeat growth 1/16/24  Yellow folder completed  Peds referral given  Breast pump order put in today

## 2024-01-10 NOTE — ASSESSMENT & PLAN NOTE
Sanchez  is a 28 y.o.  @29w6d who presents for routine prenatal visit.      28 wk labs - complete and wnl  Growth scan- scheduled  TDAP- given today  Delivery consents signed previously  Has yellow packet.    Will bring birth plan to next visit. Has questions about placenta and cord blood donation. Brochure given on cord blood donation for pt to consider    Reports good fetal movement.  Denies LOF, vaginal bleeding, regular uterine contractions, cramping, headaches or visual changes.      Reviewed PTL/Labor precautions and FKC.

## 2024-01-10 NOTE — PROGRESS NOTES
Central retinal vein occlusion  Continues Lovenox 40 mg QD  Plan for 39 wk IOL to plan for epidural    29 weeks gestation of pregnancy  Sanchez  is a 28 y.o.  @29w6d who presents for routine prenatal visit.      28 wk labs - complete and wnl  Growth scan- scheduled  TDAP- given today  Delivery consents signed previously  Has yellow packet.    Will bring birth plan to next visit. Has questions about placenta and cord blood donation. Brochure given on cord blood donation for pt to consider    Reports good fetal movement.  Denies LOF, vaginal bleeding, regular uterine contractions, cramping, headaches or visual changes.      Reviewed PTL/Labor precautions and FKC.        Anxiety during pregnancy  Increasing anxiety and panic attack in pregnancy  She has an appointment with therapist tomorrow   Reviewed option of medication initiation if symptoms are not manageable with therapy alone

## 2024-01-11 ENCOUNTER — TELEMEDICINE (OUTPATIENT)
Dept: BEHAVIORAL/MENTAL HEALTH CLINIC | Facility: CLINIC | Age: 29
End: 2024-01-11
Payer: COMMERCIAL

## 2024-01-11 DIAGNOSIS — F41.9 ANXIETY DURING PREGNANCY: ICD-10-CM

## 2024-01-11 DIAGNOSIS — O99.340 ANXIETY DURING PREGNANCY: ICD-10-CM

## 2024-01-11 PROCEDURE — 90791 PSYCH DIAGNOSTIC EVALUATION: CPT | Performed by: SOCIAL WORKER

## 2024-01-11 NOTE — PSYCH
"  Virtual Regular Visit    Verification of patient location:    Patient is located at Home in the following state in which I hold an active license [place]      Assessment/Plan:    Problem List Items Addressed This Visit          Other    Anxiety during pregnancy       Goals addressed in session: Goal 1 : Obtain background information         Reason for visit is No chief complaint on file.       Encounter provider Patria Serna LCSW    Provider located at [facility]  [address]   [place] [address] [phone]      Recent Visits  No visits were found meeting these conditions.  Showing recent visits within past 7 days and meeting all other requirements  Today's Visits  Date Type Provider Dept   01/11/24 Telemedicine Patria Serna LCSW [facility]   Showing today's visits and meeting all other requirements  Future Appointments  No visits were found meeting these conditions.  Showing future appointments within next 150 days and meeting all other requirements       The patient was identified by name and date of birth. Sanchez Shields was informed that this is a telemedicine visit and that the visit is being conducted throughthe Epic Embedded platform. She agrees to proceed..  My office door was closed. No one else was in the room.  She acknowledged consent and understanding of privacy and security of the video platform. The patient has agreed to participate and understands they can discontinue the visit at any time.    Patient is aware this is a billable service.     Subjective  Sanchez Shields is a 28 y.o. female who presented for an initial therapy session. Pt has been in therapy \"a few years ago.\"  Pt was born in [place] and moved to [place] with her parents when she was 11yo.  Pt is an only child.  Pt currently resides in the same home with her parents.  She and her  have their own space in the basement.  " "Pt's grandmother also resides in the home.  She has dementia.  Pt is  to her , Davonte (together 11 years and  2 years).  Pt enjoys baking and hiking.  She has positive supports.  She describes her household as stressful.  Pt is currently 30w0d pregnant with an DANDY of 3/21/24.  This was an unplanned but welcomed pregnancy.  Pt is feeling fatigued in her pregnancy.  Pt's sleep is \"not the best\" and her appetite is \"good.\"  Pt is seeking therapy due to stressors at home and work.  She experienced a few panic attacks last month.  Processed pt's feelings and discussed coping skills.      HPI     Past Medical History:   Diagnosis Date    Acute sinusitis 02/14/2019    Acute suppurative otitis media of right ear without spontaneous rupture of tympanic membrane 02/15/2019    Asthma     stable on albuterol inhaler    Central retinal vein occlusion of left eye 2020    vision spared, some spots    Situational anxiety 11/13/2018    managed with no medications currently    Viral URI with cough 08/23/2018       Past Surgical History:   Procedure Laterality Date    CHOLECYSTECTOMY  12/2017    WISDOM TOOTH EXTRACTION         Current Outpatient Medications   Medication Sig Dispense Refill    aspirin 81 mg chewable tablet Chew 2 tablets (162 mg total) daily 60 tablet 3    Calcium Carbonate (CALCIUM 500 PO) Take 500 mg by mouth in the morning      enoxaparin (Lovenox) 40 mg/0.4 mL Inject 0.4 mL (40 mg total) under the skin in the morning 12 mL 9    Ferrous Sulfate (Iron) 28 MG TABS Take by mouth      folic acid (FOLVITE) 800 MCG tablet Take 800 mcg by mouth daily      MAGNESIUM PO Take by mouth      metoclopramide (Reglan) 10 mg tablet Take 1 tablet (10 mg total) by mouth every 6 (six) hours 30 tablet 0    Prenatal Vit-Fe Fumarate-FA (PRENATAL VITAMIN PO) Take by mouth      Pyridoxine HCl (VITAMIN B-6 PO) Take by mouth       No current facility-administered medications for this visit.        Allergies   Allergen " Reactions    Sprintec 28 [Norgestimate-Eth Estradiol] Other (See Comments)     Blood clot in eye    Sulfa Antibiotics Rash       Review of Systems   Psychiatric/Behavioral:  The patient is nervous/anxious.      Video Exam    There were no vitals filed for this visit.    Physical Exam  Psychiatric:         Behavior: Behavior is cooperative.         Behavioral Health Psychotherapy Assessment    Date of Initial Psychotherapy Assessment: 01/11/24  Referral Source: Serena Canales, DO  Has a release of information been signed for the referral source? No    Preferred Name: Sanchez Shields  Preferred Pronouns: She/her  YOB: 1995 Age: 28 y.o.  Sex assigned at birth: female   Gender Identity: Female  Race:   Preferred Language: English    Emergency Contact:  Full Name: Davonte Shields  Relationship to Client: Spouse  Contact information: [phone]    Primary Care Physician:  FELIPE Serna  [address] [place] [address]  [phone]  Has a release of information been signed? No    Physical Health History:  Past surgical procedures: see chart  Do you have a history of any of the following: other na  Do you have any mobility issues? No    Relevant Family History:  Dad - hx of drugs  Mom - Anxiety    Presenting Problem (What brings you in?)  Anxiety    Mental Health Advance Directive:  Do you currently have a Mental Health Advance Directive?no    Diagnosis:   Diagnosis ICD-10-CM Associated Orders   1. Anxiety during pregnancy  O99.340 Ambulatory referral to Psych Services    F41.9           Initial Assessment:     Current Mental Status:    Appearance: appropriate      Behavior/Manner: cooperative      Affect/Mood:  Good    Speech:  Normal    Sleep:  Interrupted    Oriented to: oriented to self, oriented to place and oriented to time       Clinical Symptoms    Anxiety: yes      Anxiety Symptoms: nervous/anxious      Have you ever been self-injurious: No      Counseling  "History:  Previous Counseling or Treatment  (Mental Health or Drug & Alcohol): Yes    Previous Counseling Details:  Outpatient counseling \"a few years ago\"  Have you previously taken psychiatric medications: Yes    Previous Medications Attempted:  Lexapro    Suicide Risk Assessment  Have you ever had a suicide attempt: No    Are you currently experiencing suicidal thoughts: No      Substance Abuse/Addiction Assessment:  Alcohol: Yes    Frequency:  Other  Other frequency:  Socially  Heroin: No    Fentanyl: No    Opiates: No    Cocaine: No    Amphetamines: No    Hallucinogens: No    Club Drugs: No    Benzodiazepines: No    Other Rx Meds: No    Marijuana: No    Tobacco/Nicotine: No    Have you experienced blackouts as a result of substance use: No    Are you currently using any Medication Assisted Treatment for Substance Use: No      Disordered Eating History:  Do you have a history of disordered eating: No      Social Determinants of Health:    SDOH:  Stress    Trauma and Abuse History:    Have you ever been abused: No      Relationship History:    Current marital status:       Natural Supports:  Mother and father    Employment History    Are you currently employed: Yes      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

## 2024-01-12 ENCOUNTER — PROCEDURE VISIT (OUTPATIENT)
Age: 29
End: 2024-01-12
Payer: COMMERCIAL

## 2024-01-12 VITALS — BODY MASS INDEX: 29.31 KG/M2 | HEIGHT: 65 IN | WEIGHT: 175.93 LBS

## 2024-01-12 DIAGNOSIS — M54.6 RIGHT-SIDED THORACIC BACK PAIN, UNSPECIFIED CHRONICITY: ICD-10-CM

## 2024-01-12 DIAGNOSIS — M54.59 MECHANICAL LOW BACK PAIN: ICD-10-CM

## 2024-01-12 DIAGNOSIS — M99.03 SEGMENTAL DYSFUNCTION OF LUMBAR REGION: Primary | ICD-10-CM

## 2024-01-12 DIAGNOSIS — M99.01 SEGMENTAL DYSFUNCTION OF CERVICAL REGION: ICD-10-CM

## 2024-01-12 DIAGNOSIS — M99.04 SEGMENTAL DYSFUNCTION OF SACRAL REGION: ICD-10-CM

## 2024-01-12 DIAGNOSIS — M99.02 SEGMENTAL DYSFUNCTION OF THORACIC REGION: ICD-10-CM

## 2024-01-12 PROCEDURE — 98942 CHIROPRACTIC MANJ 5 REGIONS: CPT | Performed by: CHIROPRACTOR

## 2024-01-12 PROCEDURE — 97110 THERAPEUTIC EXERCISES: CPT | Performed by: CHIROPRACTOR

## 2024-01-12 NOTE — PROGRESS NOTES
Diagnoses and all orders for this visit:    Segmental dysfunction of lumbar region    Mechanical low back pain    Segmental dysfunction of cervical region    Segmental dysfunction of thoracic region    Segmental dysfunction of sacral region    Right-sided thoracic back pain, unspecified chronicity      ASSESSMENT:   Pt's symptoms and exam findings consistent with mechanical lower back and mid back pain secondary to physical stressors of pregnancy and repetitive st/sp injury, exacerbated by postural/ergonomic stressors. Pt responded well to stretches and manual mobilization of the affected spinal and myofascial tissues with increased ROM; trial of conservative tx recommended consisting of stretching, ther-ex, graded mobilization/manipulation of the affected spinal/myofascial tissues, postural/ergonomic education and take home stretches/exercises. If symptoms fail to improve with short trial of conservative care, appropriate imaging and referral will be coordinated.  - Tolerating treatment well with decrease in pain.     PROCEDURE CODES: 77505    TREATMENT:  Fear avoidance behavior discussion, encouraged and reassured pt that natural course of condition is to improve over time with adherence to tx plan and home care strategies. Home care recommendations: avoid bed rest, walk (but avoid trails and uneven surfaces), gradual return to activity to tolerance (avoid anything that peripheralizes symptoms), ice 20 min on/off, watch for ice burn, call if symptoms peripheralize, worsen, or neurologic deficit progresses. Ther-ex: IASTM - discussed post procedure soreness and/or ecchymosis for up to 36 hrs, applied to affected mm hypertonicities; wall cheryl, axial retraction, upper trap stretch, lev scap stretch, SCM stretch, lat rows with t-band, lat pull down with t-band, abdominal bracing; greater than 15 min spent performing above mentioned ther-ex. Thoracic mobilization/manipulation: prone P-A mob, supine A-P manip; cervical  mobilization/manipulation: diversified supine graded mobilization, cervical traction, Lumbar side lying mobilization, hip distraction- long axis    HPI:  Sanchez Shields is a 28 y.o. female   No chief complaint on file.    The patient reports that she is feeling typical lower neck and into the mid back pain she mentions previous treatment was chiropractic adjustment or myofascial release but it was short lived. The patient also self treatment with tennis ball. Exercise routine- stretching and yoga at home- concentrating on stretches, Today she is 24 weeks pregnant. Pt  at a [employer]. The patient reports there is no heavy lifting, but at times a lot of walking.  12/22- The patient is feeling ok. The patient reports she has been doing the exercises.  1/12- The patient reports increased pain in the mid back and neck after she mentions that an employee at her job left and she is now doing more desk work and can not get up as often    Neck Pain   Associated symptoms include headaches.   Back Pain  Associated symptoms include headaches.     Past Medical History:   Diagnosis Date   • Acute sinusitis 02/14/2019   • Acute suppurative otitis media of right ear without spontaneous rupture of tympanic membrane 02/15/2019   • Asthma     stable on albuterol inhaler   • Central retinal vein occlusion of left eye 2020    vision spared, some spots   • Situational anxiety 11/13/2018    managed with no medications currently   • Viral URI with cough 08/23/2018      The following portions of the patient's history were reviewed and updated as appropriate: allergies, past family history, past medical history, past social history, past surgical history, and problem list.  Review of Systems   Musculoskeletal:  Positive for back pain and neck pain.   Neurological:  Positive for headaches.     Physical Exam  Musculoskeletal:         General: Tenderness present.      Cervical back: Rigidity, spasms and tenderness present. No  swelling, edema, deformity, erythema, signs of trauma, lacerations, torticollis, bony tenderness or crepitus. Pain with movement present. Decreased range of motion.      Thoracic back: Spasms present. No swelling, edema, deformity, signs of trauma, lacerations or bony tenderness. Decreased range of motion. No scoliosis.      Lumbar back: Spasms present. No swelling, edema, deformity, signs of trauma, lacerations or bony tenderness. Decreased range of motion. Negative right straight leg raise test. No scoliosis.        Back:        SOFT TISSUE ASSESSMENT: Hypertonicity and tenderness palpated C5-T6 T9-12, L2-S1, MAGDI SIJ erector spinae, upper traps, lev scap, SCM, scalene, rhomboid, suboccipitals, QL R, Glute, piriformis- R, Middle/lower trap R JOINT RECTRICTIONS: C5-T6, T9-12, L2-S1, MAGDI SIJORTHO: Renetta unremarkable for centralization/peripheralization; max foraminal comp elicits local np R/L; shoulder depression elicits stiffness in R/L upper trap; brachial plexus tension test elicits neural tension in R/L UE; cervical distraction elicits relieves CC, SLUMP- Neg, SLR- Neg, Fabere- positive SIJ pain    Return in about 1 week (around 1/19/2024) for Recheck.     Dr. Go

## 2024-01-16 ENCOUNTER — ULTRASOUND (OUTPATIENT)
Facility: HOSPITAL | Age: 29
End: 2024-01-16
Payer: COMMERCIAL

## 2024-01-16 VITALS
BODY MASS INDEX: 29.85 KG/M2 | WEIGHT: 179.2 LBS | DIASTOLIC BLOOD PRESSURE: 70 MMHG | HEIGHT: 65 IN | SYSTOLIC BLOOD PRESSURE: 110 MMHG | OXYGEN SATURATION: 99 % | HEART RATE: 85 BPM

## 2024-01-16 DIAGNOSIS — Z3A.30 30 WEEKS GESTATION OF PREGNANCY: Primary | ICD-10-CM

## 2024-01-16 DIAGNOSIS — Z36.2 ENCOUNTER FOR FOLLOW-UP ULTRASOUND OF FETAL ANATOMY: ICD-10-CM

## 2024-01-16 DIAGNOSIS — Z36.89 ENCOUNTER FOR ULTRASOUND TO ASSESS FETAL GROWTH: ICD-10-CM

## 2024-01-16 PROCEDURE — 99214 OFFICE O/P EST MOD 30 MIN: CPT | Performed by: OBSTETRICS & GYNECOLOGY

## 2024-01-16 PROCEDURE — 76816 OB US FOLLOW-UP PER FETUS: CPT | Performed by: OBSTETRICS & GYNECOLOGY

## 2024-01-16 NOTE — PROGRESS NOTES
"[facility]: Sanchez Shields was seen today at 30w5d for fetal growth and followup missed anatomy ultrasound.  See ultrasound report under \"OB Procedures\" tab.  Please don't hesitate to contact our office with any concerns or questions.  -Dara Tellez MD    "

## 2024-01-23 ENCOUNTER — ROUTINE PRENATAL (OUTPATIENT)
Dept: OBGYN CLINIC | Facility: CLINIC | Age: 29
End: 2024-01-23

## 2024-01-23 ENCOUNTER — TELEPHONE (OUTPATIENT)
Dept: OBGYN CLINIC | Facility: CLINIC | Age: 29
End: 2024-01-23

## 2024-01-23 VITALS
WEIGHT: 178.4 LBS | HEART RATE: 88 BPM | DIASTOLIC BLOOD PRESSURE: 68 MMHG | SYSTOLIC BLOOD PRESSURE: 118 MMHG | BODY MASS INDEX: 29.69 KG/M2

## 2024-01-23 DIAGNOSIS — Z3A.31 31 WEEKS GESTATION OF PREGNANCY: Primary | ICD-10-CM

## 2024-01-23 DIAGNOSIS — Z34.93 PRENATAL CARE IN THIRD TRIMESTER: ICD-10-CM

## 2024-01-23 DIAGNOSIS — F41.9 ANXIETY DURING PREGNANCY: ICD-10-CM

## 2024-01-23 DIAGNOSIS — O99.340 ANXIETY DURING PREGNANCY: ICD-10-CM

## 2024-01-23 DIAGNOSIS — Z15.89 HETEROZYGOUS FOR MTHFR GENE MUTATION: ICD-10-CM

## 2024-01-23 LAB
SL AMB  POCT GLUCOSE, UA: NORMAL
SL AMB POCT URINE PROTEIN: NORMAL

## 2024-01-23 NOTE — TELEPHONE ENCOUNTER
----- Message from Nicki Billings sent at 1/23/2024 10:33 AM EST -----    ----- Message -----  From: Geovanna Mensah DO  Sent: 1/23/2024   9:33 AM EST  To: [facility]    Procedure to be scheduled: IOL     DANDY: 6/15/24 is due date, needs 39w induction- so week of 6/8/24     Indication for delivery: MTFHR gene mutation on lovenox    Requested date (s) of delivery: none   If requested date is unavailable, is there a date by which the pt must be delivered?    Physician preference: none    If IOL, anticipated method: likely will need cervical ripening, exam not performed today as only 32w

## 2024-01-23 NOTE — PROGRESS NOTES
Prenatal visit 31w5d  Denies cramping, bleeding, leakage of fluid. Normal fetal movement.  PN labs up to date  Growth Scan with MFM completed on 1/16/24

## 2024-01-23 NOTE — ASSESSMENT & PLAN NOTE
Pt doing well -Normal FM. Denies ctx, vb and lof.   Breast pump order placed  Birth plan/preferences reviewed today.  Sp tdap last visit. 28w labs WNL  IOL at 39w, request placed today

## 2024-01-23 NOTE — ASSESSMENT & PLAN NOTE
Meeting with [facility] at [facility]  Feels stable at this point  Discussed pp mood changes/expectations in detail and option to start medication, has taken lexapro in the past, immediately postpartum prior to d/c, with understanding it will not be immediate effect.  She plans to continue therapy postpartum

## 2024-01-23 NOTE — ASSESSMENT & PLAN NOTE
Cont lovenox 40 mg daily  Sp MFM consult and growth scan on 1/16, EFW 1685 grams - 3 lbs 11 oz (49%)   Plan is for 39w IOL to allow for epidural anesthesia if desired in labor

## 2024-01-23 NOTE — PROGRESS NOTES
Problem List Items Addressed This Visit       Heterozygous for MTHFR gene mutation     Cont lovenox 40 mg daily  Sp MFM consult and growth scan on , EFW 1685 grams - 3 lbs 11 oz (49%)   Plan is for 39w IOL to allow for epidural anesthesia if desired in labor         31 weeks gestation of pregnancy - Primary     Pt doing well -Normal FM. Denies ctx, vb and lof.   Breast pump order placed  Birth plan/preferences reviewed today.  Sp tdap last visit. 28w labs WNL  IOL at 39w, request placed today           Anxiety during pregnancy     Meeting with [facility] at [facility]  Feels stable at this point  Discussed pp mood changes/expectations in detail and option to start medication, has taken lexapro in the past, immediately postpartum prior to d/c, with understanding it will not be immediate effect.  She plans to continue therapy postpartum          Other Visit Diagnoses       Prenatal care in third trimester        Relevant Orders    POCT urine dip (Completed)            Sanchez Shields is a 29 y.o. at 31w5d who presents today for routine prenatal visit. No complaints. Anxiety is stable with seeing therapist, still having some social stressors.   Normal FM, Denies ctx, vb and lof.    FHR: 145  FH: 32 cm

## 2024-01-24 NOTE — TELEPHONE ENCOUNTER
patients medical induction of labor  is 3/17  at 8 pm. Dr. CARCAMO is on Sun night and Dr. Naranjo is on 3/17. Pt is aware and  notified. Pt has MTFHR gene mutation and is on lovenox

## 2024-01-25 ENCOUNTER — TELEPHONE (OUTPATIENT)
Dept: OBGYN CLINIC | Facility: CLINIC | Age: 29
End: 2024-01-25

## 2024-01-25 ENCOUNTER — TELEPHONE (OUTPATIENT)
Dept: INTERNAL MEDICINE CLINIC | Facility: OTHER | Age: 29
End: 2024-01-25

## 2024-01-25 LAB
DME PARACHUTE DELIVERY DATE ACTUAL: NORMAL
DME PARACHUTE DELIVERY DATE REQUESTED: NORMAL
DME PARACHUTE ITEM DESCRIPTION: NORMAL
DME PARACHUTE ORDER STATUS: NORMAL
DME PARACHUTE SUPPLIER NAME: NORMAL
DME PARACHUTE SUPPLIER PHONE: NORMAL

## 2024-01-25 NOTE — TELEPHONE ENCOUNTER
Pt had gall bladder out in 2017 and has not bothered her since. Today pt called pcp because of r sided upper discomfort and he told her to call us. Pt has good fetal movement and small amt discomfort where her GB was. I told pt abdomen might be pressing up and causing discomfort. Heat, tylenol.   Monitor discomfort and call if it gets worse

## 2024-01-25 NOTE — TELEPHONE ENCOUNTER
Called and spoke to patient. I recommend she contact her OBGYN regarding her abdominal pain. Patient was agreeable and will call their office today.

## 2024-01-25 NOTE — TELEPHONE ENCOUNTER
Patient is 32 weeks - pcp told her to reach out to us - she is had her gallbladder removed in 2017 - she is starting to feel discomfort and pressure on her right side as she gets further along in her pregnancy - she is concerned

## 2024-01-25 NOTE — TELEPHONE ENCOUNTER
Regarding: Gallbladder discomfort during pregnancy  Contact: [phone]  ----- Message from Jazzy Trammell MA sent at 1/25/2024  1:11 PM EST -----       ----- Message from Sanchez Shields to FELIPE Serna sent at 1/25/2024 12:31 PM -----   Osei Kern!     It’s been some time since we have seen each other. Currently, I am 32 weeks pregnant and overall things have been good. I’ve noticed as I have gotten bigger that the area where my gallbladder was is starting to have discomfort. I had it removed in December of 2017. I notice it more after I may indulge in something a bit more unhealthy which I try to limit or if I lay on my right side for a long period of time. Today more than ever it has began feeling a bit more painful. It has been fluctuating with pain throughout the day. I have no signs of infection such as fever, vomiting, nausea, and I have not noticed any other concerning symptoms for myself or my baby. Her movements are still normal and being tracked for 10 kicks per 2 hours as normal. Just wondering if there is anything I should be concerned with or is it primarily due to everything pushing due to her growth. Message or phone call response works fine for me. Looking forward to hearing back from you soon!

## 2024-01-26 ENCOUNTER — PROCEDURE VISIT (OUTPATIENT)
Age: 29
End: 2024-01-26
Payer: COMMERCIAL

## 2024-01-26 VITALS
SYSTOLIC BLOOD PRESSURE: 116 MMHG | HEART RATE: 90 BPM | DIASTOLIC BLOOD PRESSURE: 64 MMHG | WEIGHT: 178 LBS | OXYGEN SATURATION: 98 % | HEIGHT: 65 IN | BODY MASS INDEX: 29.66 KG/M2

## 2024-01-26 DIAGNOSIS — M99.01 SEGMENTAL DYSFUNCTION OF CERVICAL REGION: ICD-10-CM

## 2024-01-26 DIAGNOSIS — M99.04 SEGMENTAL DYSFUNCTION OF SACRAL REGION: ICD-10-CM

## 2024-01-26 DIAGNOSIS — M54.59 MECHANICAL LOW BACK PAIN: ICD-10-CM

## 2024-01-26 DIAGNOSIS — M99.02 SEGMENTAL DYSFUNCTION OF THORACIC REGION: ICD-10-CM

## 2024-01-26 DIAGNOSIS — M99.03 SEGMENTAL DYSFUNCTION OF LUMBAR REGION: Primary | ICD-10-CM

## 2024-01-26 DIAGNOSIS — M54.6 RIGHT-SIDED THORACIC BACK PAIN, UNSPECIFIED CHRONICITY: ICD-10-CM

## 2024-01-26 PROCEDURE — 98941 CHIROPRACT MANJ 3-4 REGIONS: CPT | Performed by: CHIROPRACTOR

## 2024-01-26 NOTE — PROGRESS NOTES
Diagnoses and all orders for this visit:    Segmental dysfunction of lumbar region    Mechanical low back pain    Segmental dysfunction of cervical region    Segmental dysfunction of thoracic region    Segmental dysfunction of sacral region    Right-sided thoracic back pain, unspecified chronicity      ASSESSMENT:   Pt's symptoms and exam findings consistent with mechanical lower back and mid back pain secondary to physical stressors of pregnancy and repetitive st/sp injury, exacerbated by postural/ergonomic stressors. Pt responded well to stretches and manual mobilization of the affected spinal and myofascial tissues with increased ROM; trial of conservative tx recommended consisting of stretching, ther-ex, graded mobilization/manipulation of the affected spinal/myofascial tissues, postural/ergonomic education and take home stretches/exercises. If symptoms fail to improve with short trial of conservative care, appropriate imaging and referral will be coordinated.  - Tolerating treatment well with decrease in pain.     PROCEDURE CODES: 50935    TREATMENT:  Fear avoidance behavior discussion, encouraged and reassured pt that natural course of condition is to improve over time with adherence to tx plan and home care strategies. Home care recommendations: avoid bed rest, walk (but avoid trails and uneven surfaces), gradual return to activity to tolerance (avoid anything that peripheralizes symptoms), ice 20 min on/off, watch for ice burn, call if symptoms peripheralize, worsen, or neurologic deficit progresses. Ther-ex: IASTM - discussed post procedure soreness and/or ecchymosis for up to 36 hrs, applied to affected mm hypertonicities; wall cheryl, axial retraction, upper trap stretch, lev scap stretch, SCM stretch, lat rows with t-band, lat pull down with t-band, abdominal bracing; greater than 15 min spent performing above mentioned ther-ex. Thoracic mobilization/manipulation: prone P-A mob, supine A-P manip; cervical  mobilization/manipulation: diversified supine graded mobilization, cervical traction, Lumbar side lying mobilization, hip distraction- long axis    HPI:  Sanchez Shields is a 29 y.o. female   Chief Complaint   Patient presents with   • Neck Pain     Neck pain-2   • Back Pain     Middle back pain-2  Lower back pain-2     The patient reports that she is feeling typical lower neck and into the mid back pain she mentions previous treatment was chiropractic adjustment or myofascial release but it was short lived. The patient also self treatment with tennis ball. Exercise routine- stretching and yoga at home- concentrating on stretches, Today she is 24 weeks pregnant. Pt  at a [employer]. The patient reports there is no heavy lifting, but at times a lot of walking.  12/22- The patient is feeling ok. The patient reports she has been doing the exercises.  1/12- The patient reports increased pain in the mid back and neck after she mentions that an employee at her job left and she is now doing more desk work and can not get up as often  1/26-The patient is feeling a little tight in the lower back and into the hips but overall improvements.    Neck Pain   Associated symptoms include headaches.   Back Pain  Associated symptoms include headaches.     Past Medical History:   Diagnosis Date   • Acute sinusitis 02/14/2019   • Acute suppurative otitis media of right ear without spontaneous rupture of tympanic membrane 02/15/2019   • Asthma     stable on albuterol inhaler   • Central retinal vein occlusion of left eye 2020    vision spared, some spots   • Situational anxiety 11/13/2018    managed with no medications currently   • Viral URI with cough 08/23/2018      The following portions of the patient's history were reviewed and updated as appropriate: allergies, past family history, past medical history, past social history, past surgical history, and problem list.  Review of Systems   Musculoskeletal:  Positive for  back pain and neck pain.   Neurological:  Positive for headaches.     Physical Exam  Musculoskeletal:         General: Tenderness present.      Cervical back: Rigidity, spasms and tenderness present. No swelling, edema, deformity, erythema, signs of trauma, lacerations, torticollis, bony tenderness or crepitus. Pain with movement present. Decreased range of motion.      Thoracic back: Spasms present. No swelling, edema, deformity, signs of trauma, lacerations or bony tenderness. Decreased range of motion. No scoliosis.      Lumbar back: Spasms present. No swelling, edema, deformity, signs of trauma, lacerations or bony tenderness. Decreased range of motion. Negative right straight leg raise test. No scoliosis.        Back:        SOFT TISSUE ASSESSMENT: Hypertonicity and tenderness palpated C5-T6 T9-12, L2-S1, MAGDI SIJ erector spinae, upper traps, lev scap, SCM, scalene, rhomboid, suboccipitals, QL R, Glute, piriformis- R, Middle/lower trap R JOINT RECTRICTIONS: C5-T6, T9-12, L2-S1, MAGDI SIJORTHO: Renetta unremarkable for centralization/peripheralization; max foraminal comp elicits local np R/L; shoulder depression elicits stiffness in R/L upper trap; brachial plexus tension test elicits neural tension in R/L UE; cervical distraction elicits relieves CC, SLUMP- Neg, SLR- Neg, Fabere- positive SIJ pain    Return in about 1 week (around 2/2/2024) for Recheck.

## 2024-01-30 ENCOUNTER — APPOINTMENT (OUTPATIENT)
Dept: LAB | Age: 29
End: 2024-01-30
Payer: COMMERCIAL

## 2024-01-30 ENCOUNTER — ROUTINE PRENATAL (OUTPATIENT)
Dept: OBGYN CLINIC | Facility: CLINIC | Age: 29
End: 2024-01-30
Payer: COMMERCIAL

## 2024-01-30 VITALS
DIASTOLIC BLOOD PRESSURE: 70 MMHG | HEART RATE: 78 BPM | OXYGEN SATURATION: 99 % | SYSTOLIC BLOOD PRESSURE: 110 MMHG | WEIGHT: 179 LBS | BODY MASS INDEX: 29.79 KG/M2

## 2024-01-30 DIAGNOSIS — R10.11 RUQ PAIN: Primary | ICD-10-CM

## 2024-01-30 DIAGNOSIS — H34.8122 STABLE CENTRAL RETINAL VEIN OCCLUSION OF LEFT EYE: ICD-10-CM

## 2024-01-30 DIAGNOSIS — Z3A.32 32 WEEKS GESTATION OF PREGNANCY: ICD-10-CM

## 2024-01-30 DIAGNOSIS — R10.11 RUQ PAIN: ICD-10-CM

## 2024-01-30 LAB
ALBUMIN SERPL BCP-MCNC: 3.5 G/DL (ref 3.5–5)
ALP SERPL-CCNC: 92 U/L (ref 34–104)
ALT SERPL W P-5'-P-CCNC: 12 U/L (ref 7–52)
ANION GAP SERPL CALCULATED.3IONS-SCNC: 11 MMOL/L
AST SERPL W P-5'-P-CCNC: 15 U/L (ref 13–39)
BACTERIA UR QL AUTO: ABNORMAL /HPF
BASOPHILS # BLD MANUAL: 0.28 THOUSAND/UL (ref 0–0.1)
BASOPHILS NFR MAR MANUAL: 2 % (ref 0–1)
BILIRUB SERPL-MCNC: 0.35 MG/DL (ref 0.2–1)
BILIRUB UR QL STRIP: NEGATIVE
BUN SERPL-MCNC: 8 MG/DL (ref 5–25)
CALCIUM SERPL-MCNC: 9.1 MG/DL (ref 8.4–10.2)
CHLORIDE SERPL-SCNC: 104 MMOL/L (ref 96–108)
CLARITY UR: CLEAR
CO2 SERPL-SCNC: 22 MMOL/L (ref 21–32)
COLOR UR: COLORLESS
CREAT SERPL-MCNC: 0.5 MG/DL (ref 0.6–1.3)
CREAT UR-MCNC: 18.9 MG/DL
EOSINOPHIL # BLD MANUAL: 0.14 THOUSAND/UL (ref 0–0.4)
EOSINOPHIL NFR BLD MANUAL: 1 % (ref 0–6)
ERYTHROCYTE [DISTWIDTH] IN BLOOD BY AUTOMATED COUNT: 13.1 % (ref 11.6–15.1)
GFR SERPL CREATININE-BSD FRML MDRD: 132 ML/MIN/1.73SQ M
GLUCOSE SERPL-MCNC: 65 MG/DL (ref 65–140)
GLUCOSE UR STRIP-MCNC: NEGATIVE MG/DL
HCT VFR BLD AUTO: 41.4 % (ref 34.8–46.1)
HGB BLD-MCNC: 14.3 G/DL (ref 11.5–15.4)
HGB UR QL STRIP.AUTO: NEGATIVE
KETONES UR STRIP-MCNC: NEGATIVE MG/DL
LEUKOCYTE ESTERASE UR QL STRIP: ABNORMAL
LIPASE SERPL-CCNC: 17 U/L (ref 11–82)
LYMPHOCYTES # BLD AUTO: 17 % (ref 14–44)
LYMPHOCYTES # BLD AUTO: 2.8 THOUSAND/UL (ref 0.6–4.47)
MCH RBC QN AUTO: 31.1 PG (ref 26.8–34.3)
MCHC RBC AUTO-ENTMCNC: 34.5 G/DL (ref 31.4–37.4)
MCV RBC AUTO: 90 FL (ref 82–98)
MONOCYTES # BLD AUTO: 0.84 THOUSAND/UL (ref 0–1.22)
MONOCYTES NFR BLD: 6 % (ref 4–12)
NEUTROPHILS # BLD MANUAL: 9.95 THOUSAND/UL (ref 1.85–7.62)
NEUTS SEG NFR BLD AUTO: 71 % (ref 43–75)
NITRITE UR QL STRIP: NEGATIVE
NON-SQ EPI CELLS URNS QL MICRO: ABNORMAL /HPF
PH UR STRIP.AUTO: 6.5 [PH]
PLATELET # BLD AUTO: 203 THOUSANDS/UL (ref 149–390)
PLATELET BLD QL SMEAR: ADEQUATE
PMV BLD AUTO: 12.3 FL (ref 8.9–12.7)
POTASSIUM SERPL-SCNC: 4.1 MMOL/L (ref 3.5–5.3)
PROT SERPL-MCNC: 6.8 G/DL (ref 6.4–8.4)
PROT UR STRIP-MCNC: NEGATIVE MG/DL
PROT UR-MCNC: <4 MG/DL
RBC # BLD AUTO: 4.6 MILLION/UL (ref 3.81–5.12)
RBC #/AREA URNS AUTO: ABNORMAL /HPF
RBC MORPH BLD: NORMAL
SL AMB  POCT GLUCOSE, UA: NORMAL
SL AMB POCT URINE PROTEIN: NORMAL
SODIUM SERPL-SCNC: 137 MMOL/L (ref 135–147)
SP GR UR STRIP.AUTO: 1.01 (ref 1–1.03)
UROBILINOGEN UR STRIP-ACNC: <2 MG/DL
VARIANT LYMPHS # BLD AUTO: 3 %
WBC # BLD AUTO: 14.01 THOUSAND/UL (ref 4.31–10.16)
WBC #/AREA URNS AUTO: ABNORMAL /HPF

## 2024-01-30 PROCEDURE — 81001 URINALYSIS AUTO W/SCOPE: CPT | Performed by: PHYSICIAN ASSISTANT

## 2024-01-30 PROCEDURE — 83690 ASSAY OF LIPASE: CPT

## 2024-01-30 PROCEDURE — 81002 URINALYSIS NONAUTO W/O SCOPE: CPT | Performed by: PHYSICIAN ASSISTANT

## 2024-01-30 PROCEDURE — 85007 BL SMEAR W/DIFF WBC COUNT: CPT

## 2024-01-30 PROCEDURE — 36415 COLL VENOUS BLD VENIPUNCTURE: CPT

## 2024-01-30 PROCEDURE — 99213 OFFICE O/P EST LOW 20 MIN: CPT | Performed by: PHYSICIAN ASSISTANT

## 2024-01-30 PROCEDURE — 80053 COMPREHEN METABOLIC PANEL: CPT

## 2024-01-30 PROCEDURE — 84156 ASSAY OF PROTEIN URINE: CPT

## 2024-01-30 PROCEDURE — 87086 URINE CULTURE/COLONY COUNT: CPT | Performed by: PHYSICIAN ASSISTANT

## 2024-01-30 PROCEDURE — 85027 COMPLETE CBC AUTOMATED: CPT

## 2024-01-30 PROCEDURE — 82570 ASSAY OF URINE CREATININE: CPT

## 2024-01-30 NOTE — TELEPHONE ENCOUNTER
Patient is calling because she is currently 32 weeks OB, following up with call from previous call with MS for pain in the gallbladder area.  She tried tips and tricks to alleviate the pain however the pain has shifted and become more severe, uncomfortable, and harder to manage.  Looking for a call back as pain is getting worse in gallbladder area.

## 2024-01-30 NOTE — TELEPHONE ENCOUNTER
Patient is 32w 5d, a po  Patient began with intermittent ruq pain that was mild so didn't mention to silvina clemente at 1/10 visit. States it has increased and now it is constant and rates as a 5-6. Has been using tylenol and heat , but ineffective  Good fm, no lof or bleeding.  Mentions that she had her gallbladder removed in 2017 and this seems to be in the same area  Tt to ag

## 2024-01-30 NOTE — ASSESSMENT & PLAN NOTE
Will plan to check UA C&S, CMP, CBC, total protein/creat ratio, lipase  Normotensive today  Reviewed PEC s/s  Advised trial of prenatal support band, compresses, tylenol  Will await lab results.  Reviewed precautions

## 2024-01-30 NOTE — PROGRESS NOTES
Patient presents to the office today for a problem OB visit.  She states over the past 3 weeks she has noted more epigastric/right upper quadrant pain.  Pain started about 3 weeks ago and was intermittent, dull and described as more of a pulling sensation.  Typically resolved with position changes.  Over the past week she notes more frequent pain in this area.  Pain is localized to the epigastric and right upper quadrant area only.  She denies radiation to the back or shoulder.  She describes the pain as persistent dull and achy.  Describes it as feeling more musculoskeletal rather than deep internal pain.    She denies associated nausea/vomiting, change in bowel habits, fever, lower abdominal pain or contractions, vaginal bleeding, loss of fluid.  She has had some reflux in the pregnancy but nothing that requires medication.  Denies worsening of reflux or heartburn at this time.  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  She has been taking Tylenol for discomfort with minimal relief.  She is trying warm and cool compresses.    She reports normal fetal movement.  She is normotensive today.  Denies headaches or blurred vision.    She is s/p cholecystectomy in 2017    Exam  Abdomen-32-week gravid uterus noted.  Mild tenderness to deep palpation in the epigastric/right upper quadrant region.  No rebound tenderness or guarding.  No CVA tenderness.    RUQ pain  Will plan to check UA C&S, CMP, CBC, total protein/creat ratio, lipase  Normotensive today  Reviewed PEC s/s  Advised trial of prenatal support band, compresses, tylenol  Will await lab results.  Reviewed precautions    32 weeks gestation of pregnancy  Keep routine OB visit as scheduled next week    Central retinal vein occlusion  Lovenox 40 mg QD  Has 39 wk IOL scheduled

## 2024-02-01 LAB
BACTERIA UR CULT: ABNORMAL
BACTERIA UR CULT: ABNORMAL

## 2024-02-02 ENCOUNTER — TELEMEDICINE (OUTPATIENT)
Dept: BEHAVIORAL/MENTAL HEALTH CLINIC | Facility: CLINIC | Age: 29
End: 2024-02-02

## 2024-02-02 DIAGNOSIS — F41.9 ANXIETY DURING PREGNANCY: Primary | ICD-10-CM

## 2024-02-02 DIAGNOSIS — O99.340 ANXIETY DURING PREGNANCY: Primary | ICD-10-CM

## 2024-02-02 NOTE — BH CRISIS PLAN
Client Name: Sanchez Shields       Client YOB: 1995    Monty Safety Plan      Creation Date: 2/2/24    Created By: Patria Serna LCSW       Step 1: Warning Signs:   Warning Signs   Crying a lot   Panic attack            Step 2: Internal Coping Strategies:   Internal Coping Strategies   Breathing   A list of graditude            Step 3: People and social settings that provide distraction:   Name Contact Information    in lawCarey In My Cell            Step 4: People whom I can ask for help during a crisis:      Name Contact Information    Sister in law In cell phone      Step 5: Professionals or agencies I can contact during a crisis:      Clinican/Agency Name Phone Emergency Contact    Patria Serna [phone]       Local Emergency Department Emergency Department Phone Emergency Department Address    [facility] [phone]         Crisis Phone Numbers:   Suicide Prevention Lifeline: Call or Text  678 Crisis Text Line: Text HOME to 370-794   Please note: Some [facility] do not have a separate number for Child/Adolescent specific crisis. If your county is not listed under Child/Adolescent, please call the adult number for your county      Adult Crisis Numbers: Child/Adolescent Crisis Numbers   [facility]: [phone] [facility]: [phone]   [facility]: [phone] [facility]: [phone]   [facility]: [phone] [place]: [phone]   [facility]: [phone] [facility]: [phone]   [facility]: [phone]   [facility]: [phone]   [facility]: [phone]   [facility]: [phone] (daytime) [phone] (after hours, weekends, holidays)      Step 6: Making the environment safer (plan for lethal means safety):   Plan: NA     Optional: What is most important to me and worth living for?   My family     Monty Safety Plan. Tania Coyne and Errol Montenegro. Used with  permission of the authors.

## 2024-02-02 NOTE — PSYCH
Virtual Regular Visit    Verification of patient location:    Patient is located at Home in the following state in which I hold an active license [place]      Assessment/Plan:    Problem List Items Addressed This Visit          Other    Anxiety during pregnancy - Primary       Goals addressed in session: Goal 1          Reason for visit is No chief complaint on file.       Encounter provider Patria Serna LCSW    Provider located at [facility]  [address]   [place] [address]  [phone]      Recent Visits  No visits were found meeting these conditions.  Showing recent visits within past 7 days and meeting all other requirements  Today's Visits  Date Type Provider Dept   02/02/24 Telemedicine Partia Serna LCSW  [facility]   Showing today's visits and meeting all other requirements  Future Appointments  No visits were found meeting these conditions.  Showing future appointments within next 150 days and meeting all other requirements       The patient was identified by name and date of birth. Sanchez Shields was informed that this is a telemedicine visit and that the visit is being conducted throughthe Epic Embedded platform. She agrees to proceed..  My office door was closed. No one else was in the room.  She acknowledged consent and understanding of privacy and security of the video platform. The patient has agreed to participate and understands they can discontinue the visit at any time.    Patient is aware this is a billable service.     Subjective  Sanchez Shields is a 29 y.o. female who presented for follow up therapy session .  Pt reported that she had her baby shower and it was very nice.  She is now getting more anxious being near the end of the third trimester.  She still needs to pack bags for herself and the baby.  Pt and her  have been talking about breast feeding vs bottle with formula.   Pt does plan to try breast feeding.  Pt's  got a job offer but it is only a 6 month contracted position with possibility of being laid off after.  Pt plans to continue working until 3/8.  She scheduled her induction for 3/18.  Overall, pt is doing well and she is managing her anxiety with rest and is going to the gym.      HPI     Past Medical History:   Diagnosis Date    Acute sinusitis 02/14/2019    Acute suppurative otitis media of right ear without spontaneous rupture of tympanic membrane 02/15/2019    Asthma     stable on albuterol inhaler    Central retinal vein occlusion of left eye 2020    vision spared, some spots    Situational anxiety 11/13/2018    managed with no medications currently    Viral URI with cough 08/23/2018       Past Surgical History:   Procedure Laterality Date    CHOLECYSTECTOMY  12/2017    WISDOM TOOTH EXTRACTION         Current Outpatient Medications   Medication Sig Dispense Refill    aspirin 81 mg chewable tablet Chew 2 tablets (162 mg total) daily 60 tablet 3    Calcium Carbonate (CALCIUM 500 PO) Take 500 mg by mouth in the morning      enoxaparin (Lovenox) 40 mg/0.4 mL Inject 0.4 mL (40 mg total) under the skin in the morning 12 mL 9    Ferrous Sulfate (Iron) 28 MG TABS Take by mouth      folic acid (FOLVITE) 800 MCG tablet Take 800 mcg by mouth daily      MAGNESIUM PO Take by mouth      metoclopramide (Reglan) 10 mg tablet Take 1 tablet (10 mg total) by mouth every 6 (six) hours 30 tablet 0    Prenatal Vit-Fe Fumarate-FA (PRENATAL VITAMIN PO) Take by mouth      Pyridoxine HCl (VITAMIN B-6 PO) Take by mouth       No current facility-administered medications for this visit.        Allergies   Allergen Reactions    Sprintec 28 [Norgestimate-Eth Estradiol] Other (See Comments)     Blood clot in eye    Sulfa Antibiotics Rash       Review of Systems    Video Exam    There were no vitals filed for this visit.    Physical Exam     Behavioral Health Psychotherapy Progress  "Note    Psychotherapy Provided: Individual Psychotherapy     1. Anxiety during pregnancy            Goals addressed in session: Goal 1     DATA:   During this session, this clinician used the following therapeutic modalities: Engagement Strategies, Client-centered Therapy, Mindfulness-based Strategies, Solution-Focused Therapy, and Supportive Psychotherapy    Substance Abuse was not addressed during this session. If the client is diagnosed with a co-occurring substance use disorder, please indicate any changes in the frequency or amount of use: na. Stage of change for addressing substance use diagnoses: No substance use/Not applicable    ASSESSMENT:  Sanchez Shields presents with a Anxious mood.     her affect is Normal range and intensity, which is congruent, with her mood and the content of the session. The client has made progress on their goals.     Sanchez Shields presents with a none risk of suicide, none risk of self-harm, and none risk of harm to others.    For any risk assessment that surpasses a \"low\" rating, a safety plan must be developed.    A safety plan was indicated: no  If yes, describe in detail na    PLAN: Between sessions, Sanchez Shields will continue to utilize coping skills. At the next session, the therapist will use Engagement Strategies, Client-centered Therapy, Mindfulness-based Strategies, Solution-Focused Therapy, and Supportive Psychotherapy to address anxiety.    Behavioral Health Treatment Plan and Discharge Planning: Sanchez Shields is aware of and agrees to continue to work on their treatment plan. They have identified and are working toward their discharge goals. yes    Visit start and stop times:    02/02/24  Start Time: 0802  Stop Time: 0853  Total Visit Time: 51 minutes        "

## 2024-02-02 NOTE — BH TREATMENT PLAN
Outpatient Behavioral Health Psychotherapy Treatment Plan    Sanchez Shields  1995     Date of Initial Psychotherapy Assessment: 1/11/24  Date of Current Treatment Plan: 02/02/24  Treatment Plan Target Date: 8/2/24  Treatment Plan Expiration Date: 8/2/24    Diagnosis:   No diagnosis found.    Area(s) of Need: Emotion regulation;     Long Term Goal 1 (in the client's own words): Better management with my emotions, time, setting boundaries    Stage of Change: Preparation    Target Date for completion: 8/2/24     Anticipated therapeutic modalities: Supportive; Mindfulness; Solution focused;      People identified to complete this goal: Sanchez      Objective 1: (identify the means of measuring success in meeting the objective): Will improve emotion regulation, time management and setting boundaries by 50%        I am currently under the care of a [facility] psychiatric provider: no    My [facility] psychiatric provider is: NA    I am currently taking psychiatric medications: No    I feel that I will be ready for discharge from mental health care when I reach the following (measurable goal/objective): When I can regulate emotions, time management and setting boundaries by 50%    For children and adults who have a legal guardian:   Has there been any change to custody orders and/or guardianship status? NA. If yes, attach updated documentation.    I have created my Crisis Plan and have been offered a copy of this plan    Behavioral Health Treatment Plan [facility]: Diagnosis and Treatment Plan explained to Sanchez Shields acknowledges an understanding of their diagnosis. Sanchez Shields agrees to this treatment plan.    I have been offered a copy of this Treatment Plan. yes

## 2024-02-05 ENCOUNTER — CLINICAL SUPPORT (OUTPATIENT)
Age: 29
End: 2024-02-05

## 2024-02-05 DIAGNOSIS — Z32.2 ENCOUNTER FOR CHILDBIRTH INSTRUCTION: Primary | ICD-10-CM

## 2024-02-07 ENCOUNTER — ROUTINE PRENATAL (OUTPATIENT)
Dept: OBGYN CLINIC | Facility: CLINIC | Age: 29
End: 2024-02-07
Payer: COMMERCIAL

## 2024-02-07 VITALS
BODY MASS INDEX: 29.99 KG/M2 | HEART RATE: 96 BPM | DIASTOLIC BLOOD PRESSURE: 64 MMHG | WEIGHT: 180.2 LBS | SYSTOLIC BLOOD PRESSURE: 108 MMHG | OXYGEN SATURATION: 99 %

## 2024-02-07 DIAGNOSIS — F41.9 ANXIETY DURING PREGNANCY: ICD-10-CM

## 2024-02-07 DIAGNOSIS — H34.8122 STABLE CENTRAL RETINAL VEIN OCCLUSION OF LEFT EYE: ICD-10-CM

## 2024-02-07 DIAGNOSIS — O99.340 ANXIETY DURING PREGNANCY: ICD-10-CM

## 2024-02-07 DIAGNOSIS — Z34.93 PRENATAL CARE IN THIRD TRIMESTER: Primary | ICD-10-CM

## 2024-02-07 DIAGNOSIS — Z3A.33 33 WEEKS GESTATION OF PREGNANCY: ICD-10-CM

## 2024-02-07 DIAGNOSIS — J45.20 MILD INTERMITTENT ASTHMA WITHOUT COMPLICATION: ICD-10-CM

## 2024-02-07 LAB
SL AMB  POCT GLUCOSE, UA: NORMAL
SL AMB POCT URINE PROTEIN: NORMAL

## 2024-02-07 PROCEDURE — 81002 URINALYSIS NONAUTO W/O SCOPE: CPT | Performed by: STUDENT IN AN ORGANIZED HEALTH CARE EDUCATION/TRAINING PROGRAM

## 2024-02-07 PROCEDURE — 99213 OFFICE O/P EST LOW 20 MIN: CPT | Performed by: STUDENT IN AN ORGANIZED HEALTH CARE EDUCATION/TRAINING PROGRAM

## 2024-02-07 NOTE — PROGRESS NOTES
33 weeks gestation of pregnancy  30yo  at 33.6wks presents for routine prenatal visit     Pt denies contractions, vaginal bleeding, leakage of fluid. Endorses fetal movement.     -continue PNVs  -s/p Tdap  -delivery consent signed, has breast pump  - labor precautions provided   -return in 2 weeks     Anxiety during pregnancy  -follows with [facility] for talk therapy, considering medical therapy if indicated in the future     Central retinal vein occlusion  -IOL scheduled 3/17  -continued Lovenox 40mg daily    Mild intermittent asthma without complication  -stable, not currently using inhaler

## 2024-02-07 NOTE — ASSESSMENT & PLAN NOTE
-follows with baby [facility] for talk therapy, considering medical therapy if indicated in the future

## 2024-02-07 NOTE — ASSESSMENT & PLAN NOTE
28yo  at 33.6wks presents for routine prenatal visit     Pt denies contractions, vaginal bleeding, leakage of fluid. Endorses fetal movement.     -continue PNVs  -s/p Tdap  -delivery consent signed, has breast pump  - labor precautions provided   -return in 2 weeks

## 2024-02-22 ENCOUNTER — ROUTINE PRENATAL (OUTPATIENT)
Dept: OBGYN CLINIC | Facility: CLINIC | Age: 29
End: 2024-02-22
Payer: COMMERCIAL

## 2024-02-22 VITALS
HEART RATE: 94 BPM | BODY MASS INDEX: 30.82 KG/M2 | WEIGHT: 185.2 LBS | SYSTOLIC BLOOD PRESSURE: 108 MMHG | DIASTOLIC BLOOD PRESSURE: 64 MMHG

## 2024-02-22 DIAGNOSIS — Z3A.36 36 WEEKS GESTATION OF PREGNANCY: ICD-10-CM

## 2024-02-22 DIAGNOSIS — O99.340 ANXIETY DURING PREGNANCY: ICD-10-CM

## 2024-02-22 DIAGNOSIS — H34.8122 STABLE CENTRAL RETINAL VEIN OCCLUSION OF LEFT EYE: ICD-10-CM

## 2024-02-22 DIAGNOSIS — F41.9 ANXIETY DURING PREGNANCY: ICD-10-CM

## 2024-02-22 DIAGNOSIS — Z34.93 PRENATAL CARE IN THIRD TRIMESTER: Primary | ICD-10-CM

## 2024-02-22 LAB
SL AMB  POCT GLUCOSE, UA: NORMAL
SL AMB POCT URINE PROTEIN: NORMAL

## 2024-02-22 PROCEDURE — 99213 OFFICE O/P EST LOW 20 MIN: CPT | Performed by: STUDENT IN AN ORGANIZED HEALTH CARE EDUCATION/TRAINING PROGRAM

## 2024-02-22 PROCEDURE — 87150 DNA/RNA AMPLIFIED PROBE: CPT | Performed by: STUDENT IN AN ORGANIZED HEALTH CARE EDUCATION/TRAINING PROGRAM

## 2024-02-22 PROCEDURE — 81002 URINALYSIS NONAUTO W/O SCOPE: CPT | Performed by: STUDENT IN AN ORGANIZED HEALTH CARE EDUCATION/TRAINING PROGRAM

## 2024-02-22 NOTE — ASSESSMENT & PLAN NOTE
30yo  at 36.0wks presents for routine prenatal visit     Pt denies contractions, vaginal bleeding, leakage of fluid. Endorses fetal movement.     -continue PNVs   -delivery consent signed, breast pump received   -gbs collected today  -s/p Tdap   -SVE /-3  - labor precautions provided   -return in 1 week

## 2024-02-22 NOTE — ASSESSMENT & PLAN NOTE
-continue Lovenox 40mg daily   -IOL scheduled 3/17/24, discussed precautions on when to discontinue lovenox if she's experiencing regular contractions

## 2024-02-22 NOTE — ASSESSMENT & PLAN NOTE
-follows with baby and me for talk therapy, no indication for medical therapy at this time   -feels much better today, feels well prepared for baby and work is less stressful. Has good support from partner.

## 2024-02-22 NOTE — PROGRESS NOTES
36 weeks gestation of pregnancy  30yo  at 36.0wks presents for routine prenatal visit     Pt denies contractions, vaginal bleeding, leakage of fluid. Endorses fetal movement.     -continue PNVs   -delivery consent signed, breast pump received   -gbs collected today  -s/p Tdap   -SVE 3  - labor precautions provided   -return in 1 week     Anxiety during pregnancy  -follows with baby and me for talk therapy, no indication for medical therapy at this time   -feels much better today, feels well prepared for baby and work is less stressful. Has good support from partner.     Central retinal vein occlusion  -continue Lovenox 40mg daily   -IOL scheduled 3/17/24, discussed precautions on when to discontinue lovenox if she's experiencing regular contractions

## 2024-02-25 LAB — GP B STREP DNA SPEC QL NAA+PROBE: NEGATIVE

## 2024-02-27 PROBLEM — Z3A.37 37 WEEKS GESTATION OF PREGNANCY: Status: ACTIVE | Noted: 2023-09-05

## 2024-02-27 NOTE — ASSESSMENT & PLAN NOTE
Sanchez Shields is a 29 y.o.   37w0d who presents for routine PNV.  Induction scheduled for 3/17-   28 week labs reviewed: NML  TWG 22.7 kg (50 lb)   Denies OB complaints. Good fetal movement. Denies contractions, cramping, leakage of fluid or vaginal bleeding.   Tdap vaccine completed  Reviewed  labor precautions and FKCs.   Advised to continue  Perineal massage   Pregnancy Essential guide and Baby and Me web site recommended

## 2024-02-27 NOTE — PATIENT INSTRUCTIONS
Pregnancy at 35 to 38 Weeks   AMBULATORY CARE:   Changes happening with your body:  You are considered full term at the beginning of 37 weeks. Your breathing may be easier if your baby has moved down into a head-down position. You may need to urinate more often because the baby may be pressing on your bladder. You may also feel more discomfort and get tired easily.   Seek care immediately if:   You develop a severe headache that does not go away.    You have new or increased vision changes, such as blurred or spotted vision.    You have new or increased swelling in your face or hands.    You have vaginal spotting or bleeding.    Your water broke or you feel warm water gushing or trickling from your vagina.    Call your obstetrician if:   You have more than 5 contractions in 1 hour.    You notice any changes in your baby's movements.    You have abdominal cramps, pressure, or tightening.    You have a change in vaginal discharge.    You have chills or a fever.    You have vaginal itching, burning, or pain.    You have yellow, green, white, or foul-smelling vaginal discharge.    You have pain or burning when you urinate, less urine than usual, or pink or bloody urine.    You have questions or concerns about your condition or care.    How to care for yourself at this stage of your pregnancy:       Eat a variety of healthy foods.  Healthy foods include fruits, vegetables, whole-grain breads, low-fat dairy foods, beans, lean meats, and fish. Drink liquids as directed. Ask how much liquid to drink each day and which liquids are best for you. Limit caffeine to less than 200 milligrams each day. Limit your intake of fish to 2 servings each week. Choose fish low in mercury such as canned light tuna, shrimp, salmon, cod, or tilapia. Do not  eat fish high in mercury such as swordfish, tilefish, gerald mackerel, and shark.         Take prenatal vitamins as directed.  Your need for certain vitamins and minerals, such as folic  acid, increases during pregnancy. Prenatal vitamins provide some of the extra vitamins and minerals you need. Prenatal vitamins may also help to decrease the risk of certain birth defects.         Rest as needed.  Put your feet up if you have swelling in your ankles and feet.         Talk to your healthcare provider about exercise.  Moderate exercise can help you stay fit. Your healthcare provider will help you plan an exercise program that is safe for you during pregnancy.         Do not smoke.  Smoking increases your risk of a miscarriage and other health problems during your pregnancy. Smoking can cause your baby to be born early or weigh less at birth. Ask your healthcare provider for information if you need help quitting.    Do not drink alcohol.  Alcohol passes from your body to your baby through the placenta. It can affect your baby's brain development and cause fetal alcohol syndrome (FAS). FAS is a group of conditions that causes mental, behavior, and growth problems.    Talk to your healthcare provider before you take any medicines.  Many medicines may harm your baby if you take them when you are pregnant. Do not take any medicines, vitamins, herbs, or supplements without first talking to your healthcare provider. Never use illegal or street drugs (such as marijuana or cocaine) while you are pregnant.  Safety tips during pregnancy:   Avoid hot tubs and saunas.  Do not use a hot tub or sauna while you are pregnant, especially during your first trimester. Hot tubs and saunas may raise your baby's temperature and increase the risk of birth defects.    Avoid toxoplasmosis.  This is an infection caused by eating raw meat or being around infected cat feces. It can cause birth defects, miscarriages, and other problems. Wash your hands after you touch raw meat. Make sure any meat is well-cooked before you eat it. Avoid raw eggs and unpasteurized milk. Use gloves or ask someone else to clean your cat's litter box  while you are pregnant.         Ask your healthcare provider about travel.  The most comfortable time to travel is during the second trimester. Ask your provider if you can travel after 36 weeks. You may not be able to travel in an airplane after 36 weeks. He or she may also recommend you avoid long road trips.    Changes happening with your baby:  By 38 weeks, your baby may weigh between 6 and 9 pounds. Your baby may be about 14 inches long from the top of the head to the rump (baby's bottom). Your baby hears well enough to know your voice. As your baby gets larger, you may feel fewer kicks and more stretching and rolling. Your baby may move into a head-down position. Your baby will also rest lower in your abdomen.  What you need to know about prenatal care:  Your healthcare provider will check your blood pressure and weight. You may also need the following:  A urine test  may also be done to check for sugar and protein. These can be signs of gestational diabetes or infection. Protein in your urine may also be a sign of preeclampsia. Preeclampsia is a condition that can develop during week 20 or later of your pregnancy. It causes high blood pressure, and it can cause problems with your kidneys and other organs.    A gestational diabetes screen  may be done. Your healthcare provider may order either a 1-step or 2-step oral glucose tolerance test (OGTT).     1-step OGTT:  Your blood sugar level will be tested after you have not eaten for 8 hours (fasting). You will then be given a glucose drink. Your level will be tested again 1 hour and 2 hours after you finish the drink.    2-step OGTT:  You do not have to fast for the first part of the test. You will have the glucose drink at any time of day. Your blood sugar level will be checked 1 hour later. If your blood sugar is higher than a certain level, another test will be ordered. You will fast and your blood sugar level will be tested. You will have the glucose drink.  Your blood will be tested again 1 hour, 2 hours, and 3 hours after you finish the glucose drink.    A blood test  may be done to check for anemia (low iron level).    A Tdap vaccine  may be recommended by your healthcare provider.    A group B strep test  is a test that is done to check for group B strep infection. Group B strep is a type of bacteria that may be found in the vagina or rectum. It can be passed to your baby during delivery if you have it. Your healthcare provider will take swab your vagina or rectum and send the sample to the lab for tests.    Fundal height  is a measurement of your uterus to check your baby's growth. This number is usually the same as the number of weeks that you have been pregnant. Your healthcare provider may also check your baby's position.    Your baby's heart rate  will be checked.    Follow up with your obstetrician as directed:  Write down your questions so you remember to ask them during your visits.  © Copyright [employer] 2023 Information is for End User's use only and may not be sold, redistributed or otherwise used for commercial purposes.  The above information is an  only. It is not intended as medical advice for individual conditions or treatments. Talk to your doctor, nurse or pharmacist before following any medical regimen to see if it is safe and effective for you.  Kick Counts in Pregnancy   AMBULATORY CARE:   Kick counts  measure how much your baby is moving in your womb. A kick from your baby can be felt as a twist, turn, swish, roll, or jab. It is common to feel your baby kicking at 26 to 28 weeks of pregnancy. You may feel your baby kick as early as 20 weeks of pregnancy. You may want to start counting at 28 weeks.   Contact your doctor immediately if:   You feel a change in the number of kicks or movements of your baby.     You feel fewer than 10 kicks within 2 hours.     You have questions or concerns about your baby's movements.    Why measure  kick counts:  Your baby's movement may provide information about your baby's health. He or she may move less, or not at all, if there are problems. Your baby may move less if he or she is not getting enough oxygen or nutrition from the placenta. Do not smoke while you are pregnant. Smoking decreases the amount of oxygen that gets to your baby. Talk to your healthcare provider if you need help to quit smoking. Tell your healthcare provider as soon as you feel a change in your baby's movements.  When to measure kick counts:   Measure kick counts at the same time every day.      Measure kick counts when your baby is awake and most active. Your baby may be most active in the evening.    How to measure kick counts:  Check that your baby is awake before you measure kick counts. You can wake up your baby by lightly pushing on your belly, walking, or drinking something cold. Your healthcare provider may tell you different ways to measure kick counts. You may be told to do the following:  Use a chart or clock to keep track of the time you start and finish counting.     Sit in a chair or lie on your left side.     Place your hands on the largest part of your belly.     Count until you reach 10 kicks. Write down how much time it takes to count 10 kicks.     It may take 30 minutes to 2 hours to count 10 kicks. It should not take more than 2 hours to count 10 kicks.    Follow up with your doctor as directed:  Write down your questions so you remember to ask them during your visits.   © Copyright [employer] 2023 Information is for End User's use only and may not be sold, redistributed or otherwise used for commercial purposes.  The above information is an  only. It is not intended as medical advice for individual conditions or treatments. Talk to your doctor, nurse or pharmacist before following any medical regimen to see if it is safe and effective for you.      Perineal Massage    Perineal massage is recommended  starting after 34 weeks in order to reduce risks of perineal tearing during childbirth.  You have been provided and instructional sheet in your yellow 28 week prenatal packet.       Having Your Baby: The Labor Process   AMBULATORY CARE:   The labor process  is a series of 3 stages that your body goes through to deliver your baby. It is not known for sure what causes labor to begin. Hormones made by you and your baby and changes in your uterus may help labor to start. Labor usually starts 2 weeks before or after your due date. Most women do not have their baby exactly on their due date.   Call your obstetrician if:   You have vaginal spotting or bleeding.    Your water broke or you feel warm water gushing or trickling from your vagina.    You have more than 5 contractions in 1 hour.    You have bloody mucus or show.    You notice any changes in your baby's movements.     You have abdominal cramps, pressure, or tightening.    You have a change in vaginal discharge.    You have questions or concerns about your condition or care.    First stage of labor:  The first stage of labor includes latent (early) labor and active labor. This stage may last up to 12 hours if this is your first pregnancy. It may last up to 10 hours if you delivered a baby before. Your uterus will contract to prepare your cervix for delivery and to push your baby out of the birth canal. Your cervix will dilate (widen) and efface (soften and become thinner). Your contractions may last from 30 to 60 seconds. The contractions usually start in the back and move to the front. You may also have a pink, clear, or slightly bloody discharge called bloody show. Bloody show is caused by the movement of a mucus plug from your cervix. During pregnancy the mucus plug blocks your cervix to prevent it from opening  What to do during early labor:  Early labor may last for several hours. You will most likely be at home during early labor. Rest as much as possible  while you are at home. Have someone rub your back. It may be helpful to place ice packs on your lower back. Go for a short walk if you are able. Drink water and suck on ice chips. Ask your healthcare provider if it is okay to eat during early labor.   How to know when you are in active labor:  This stage may last up to 12 hours if this is your first pregnancy. It may last up to 10 hours if you delivered a baby before. Your contractions will get stronger, last longer, and happen more frequently. They will also become more intense and painful. Time your contractions from the beginning of one to the beginning of the next. Write this information down for 1 hour. Your healthcare provider will tell you when to go to the hospital or birthing center. This will be based on how many minutes apart your contractions are.   Second stage of labor:  The second stage is the time between full cervix dilation and the birth of your baby. Your cervix will be completely dilated to 10 centimeters and your baby will be ready to be born. The second stage usually lasts 20 minutes to 2 hours. It may last up to 3 hours if this is your first baby.  You may be given antibiotics to fight a bacterial infection you have or prevent an infection during delivery.    Healthcare providers will help you find a position for giving birth that is comfortable for you. You can lie on your back, have your feet up in stirrups, or squat.    You may feel pressure on your rectum and the urge to push. This pressure is caused by the movement of your baby's head down the birth canal. Your healthcare provider will have you push when you feel the urge. He or she will guide your baby out of the birth canal. Forceps or suction may be used to help deliver your baby. You may also need an episiotomy (incision) to make the vaginal opening larger. This will make more room for your baby. Your perineum will be protected during delivery. This may be with a warm compress or  massage of the area.    At least 1 minute after your baby is born, your healthcare provider will put clamps on the umbilical cord. The cord will then be cut. Your baby may be placed on your chest right away. He or she may also start breastfeeding.    Third stage of labor:  The placenta (afterbirth) is delivered during this stage. After you give birth, your uterus will continue to contract to help push out the placenta. These contractions will begin 5 to 30 minutes after you give birth. Your healthcare provider will tell you when to push. You may have chills or shakiness during this stage. You may be given medicine to help prevent heavy bleeding that can happen during this stage.  How to manage labor pain:  Pain can be managed naturally or with medicines. You can naturally manage pain by using relaxation methods and controlled breathing. There are different types of medicines that can be used to relieve pain while you are in labor. These medicines may be given through an IV or an epidural (thin catheter in your lower back). Talk with your healthcare about your options for pain medicines if you choose to use them. Tell your provider if you prefer not to have any pain control medicines during labor.   © Copyright [employer] 2023 Information is for End User's use only and may not be sold, redistributed or otherwise used for commercial purposes.  The above information is an  only. It is not intended as medical advice for individual conditions or treatments. Talk to your doctor, nurse or pharmacist before following any medical regimen to see if it is safe and effective for you.      Induction of Labor   WHAT YOU NEED TO KNOW:   What is induction of labor?  Induction of labor is a procedure to induce (start) your labor before it begins on its own. Medicines and other methods are used to start contractions and help your cervix soften, thin (efface), and dilate (open). You may be given antibiotics to fight a  bacterial infection you have or prevent an infection during delivery.  Why might I need induction of labor?   A health problem you have, such as high blood pressure or diabetes    A health problem your baby has, such as a slow heartbeat or poor growth inside the womb     Problems related to your pregnancy, such as infection of the amniotic fluid, your water breaks before labor begins, or you have too little amniotic fluid    What happens during induction of labor?  Your healthcare provider may use one or more of the following to induce labor:  Cervical ripening  is a process that helps to soften and thin out your cervix. Medicines called prostaglandins may be used to ripen your cervix. These medicines can be inserted into your vagina or taken as a pill. Other methods can also be used to dilate the cervix. This includes a catheter with an inflatable balloon on the end that is inserted into your cervix. Saline injected through the catheter helps the balloon to expand. A substance that absorbs water may also be inserted into your cervix to help dilate it.     Stripping the membranes  is a procedure that causes your body to release prostaglandins naturally. Prostaglandins soften the cervix and may help to cause contractions. Your healthcare provider will sweep a gloved finger over the membranes that connect the amniotic sac to the uterus wall.     Rupturing the amniotic sac  is a procedure that is used to cause your water to break. Your healthcare provider will use a small tool to make a hole in your amniotic sac. This may help contractions to start.     Oxytocin  may be given through an IV to cause contractions to start and stay strong and regular.    What are the risks of induction of labor?  Medicines used to induce labor may cause too many contractions. This can lower your baby's heartbeat and decrease his or her oxygen supply. Induction of labor also increases the risk of umbilical cord prolapse. This condition  causes the umbilical cord to slip back into the vagina before delivery. It can compress the cord and decrease your baby's oxygen supply. Medical induction may cause an infection in you or your baby. Medical induction may also increase your risk for a  section (), especially if it is the first time you give birth. Your uterus may rupture if you have had a  before.  CARE AGREEMENT:   You have the right to help plan your care. Learn about your health condition and how it may be treated. Discuss treatment options with your healthcare providers to decide what care you want to receive. You always have the right to refuse treatment. The above information is an  only. It is not intended as medical advice for individual conditions or treatments. Talk to your doctor, nurse or pharmacist before following any medical regimen to see if it is safe and effective for you.  ©  [employer]2023 Information is for End User's use only and may not be sold, redistributed or otherwise used for commercial purposes.

## 2024-02-28 NOTE — PROGRESS NOTES
37w0d  Pap 2022.Negative  GC/CT:2023 Neg / Neg   PN1 Labs: 2023  Blood Type: A  Positive :   MFM Level 1:2023  MFM Level 2:2023  AFP: 10/19/2023  28 Week Labs:  TDap:1/10/2024  Flu:10/19/2023  GBS: 2024  Negative   Blue Folder: given   Yellow Folder: given   Ped Referral : given   Breast pump: Order   L&D forms: Done   Delivery consent: Done     Patient reports positive fetal movements with no lost of fluids and no contractions at this time .  Patient is declining a vaginal examination at  today's visit .

## 2024-02-29 ENCOUNTER — ROUTINE PRENATAL (OUTPATIENT)
Dept: OBGYN CLINIC | Facility: CLINIC | Age: 29
End: 2024-02-29
Payer: COMMERCIAL

## 2024-02-29 VITALS
HEIGHT: 65 IN | SYSTOLIC BLOOD PRESSURE: 120 MMHG | BODY MASS INDEX: 31.32 KG/M2 | DIASTOLIC BLOOD PRESSURE: 68 MMHG | WEIGHT: 188 LBS

## 2024-02-29 DIAGNOSIS — Z34.03 ENCOUNTER FOR SUPERVISION OF NORMAL FIRST PREGNANCY IN THIRD TRIMESTER: Primary | ICD-10-CM

## 2024-02-29 LAB
SL AMB  POCT GLUCOSE, UA: NORMAL
SL AMB POCT URINE PROTEIN: NORMAL

## 2024-02-29 PROCEDURE — 99213 OFFICE O/P EST LOW 20 MIN: CPT | Performed by: OBSTETRICS & GYNECOLOGY

## 2024-02-29 PROCEDURE — 81002 URINALYSIS NONAUTO W/O SCOPE: CPT | Performed by: OBSTETRICS & GYNECOLOGY

## 2024-02-29 NOTE — PROGRESS NOTES
Problem   37 Weeks Gestation of Pregnancy    Blood Type: A. +        Pap 2022. Neg , GC/CT   PN1 Labs:  NML + early glucose NML,  H&H: 13.8/42.1  28 Week Labs: HB 12.5/ 119/ RPR NR  AFP:  Level 1:  Level 2:  Tdap: 1/10/24  Flu:   GBS swab:     Blue folder:Given   Yellow folder:     Breast pump order:  L&D forms:    Delivery consent: 23  IOL:            Heterozygous for Mthfr Gene Mutation     37 weeks gestation of pregnancy  Sanchez Shields is a 29 y.o.   37w0d who presents for routine PNV.  Induction scheduled for 3/17-   28 week labs reviewed: NML  TWG 22.7 kg (50 lb)   Denies OB complaints. Good fetal movement. Denies contractions, cramping, leakage of fluid or vaginal bleeding.   Tdap vaccine completed  Reviewed  labor precautions and FKCs.   Advised to continue  Perineal massage   Pregnancy Essential guide and Baby and Me web site recommended       Heterozygous for MTHFR gene mutation  Continues with Lovenox daily  Will stop Lovenox 24 hrs prior to induction

## 2024-03-06 ENCOUNTER — TELEMEDICINE (OUTPATIENT)
Dept: BEHAVIORAL/MENTAL HEALTH CLINIC | Facility: CLINIC | Age: 29
End: 2024-03-06
Payer: COMMERCIAL

## 2024-03-06 DIAGNOSIS — O99.340 ANXIETY DURING PREGNANCY: Primary | ICD-10-CM

## 2024-03-06 DIAGNOSIS — F41.9 ANXIETY DURING PREGNANCY: Primary | ICD-10-CM

## 2024-03-06 PROCEDURE — 90834 PSYTX W PT 45 MINUTES: CPT | Performed by: SOCIAL WORKER

## 2024-03-06 NOTE — PSYCH
"  Virtual Regular Visit    Verification of patient location:    Patient is located at Home in the following state in which I hold an active license [place]      Assessment/Plan:    Problem List Items Addressed This Visit          Other    Anxiety during pregnancy - Primary       Goals addressed in session: Goal 1          Reason for visit is No chief complaint on file.       Encounter provider Patria Serna LCSW    Provider located at DIALYSIS CENTER  [facility]  [address]  [facility] [place] [address]      Recent Visits  No visits were found meeting these conditions.  Showing recent visits within past 7 days and meeting all other requirements  Today's Visits  Date Type Provider Dept   03/06/24 Telemedicine Patria Serna LCSW [facility]   Showing today's visits and meeting all other requirements  Future Appointments  No visits were found meeting these conditions.  Showing future appointments within next 150 days and meeting all other requirements       The patient was identified by name and date of birth. Sanchez Shields was informed that this is a telemedicine visit and that the visit is being conducted throughthe Epic Embedded platform. She agrees to proceed..  My office door was closed. No one else was in the room.  She acknowledged consent and understanding of privacy and security of the video platform. The patient has agreed to participate and understands they can discontinue the visit at any time.    Patient is aware this is a billable service.     Subjective  Sanchez Shields is a 29 y.o. female who presented for follow up therapy session.  Pt reported that things have been \"really good.\"  She got to meet up with her friend who struggles with mental health issues.  They had a nice visit and pt discussed implementing boundaries once the baby arrives.  This was received well by pt's friend.  Pt's  got a job back at a former place of employment with better pay.  He " is very happy in his new position.  They are also allowing him to utilize parental leave when pt delivers.   Pt will be induced on 3/18 and she is ready but nervous.  She and her  have found the classes offered by [facility] to be helpful in preparing for the baby and delivery.  Pt talked about her mother who has spinal stenosis and is now back to utilizing a walker to get around.  Her mother will be getting another injection next week and hopefully this will be helpful.  Pt discussed how communication with her parents is challenging.  They tend to avoid discussion and just continue on with things.  Pt's  was raised differently in which they openly discussed their feelings, etc and pt has learned to do this with her .  Pt's last day of work is on Friday.  She will be taking 12 weeks maternity leave with a return to work date of 6/10/24 and she will only go back part time.  Processed pt's feelings and encouraged continued use of coping skills.         HPI     Past Medical History:   Diagnosis Date    Acute sinusitis 02/14/2019    Acute suppurative otitis media of right ear without spontaneous rupture of tympanic membrane 02/15/2019    Asthma     stable on albuterol inhaler    Central retinal vein occlusion of left eye 2020    vision spared, some spots    Situational anxiety 11/13/2018    managed with no medications currently    Viral URI with cough 08/23/2018       Past Surgical History:   Procedure Laterality Date    CHOLECYSTECTOMY  12/2017    WISDOM TOOTH EXTRACTION         Current Outpatient Medications   Medication Sig Dispense Refill    Calcium Carbonate (CALCIUM 500 PO) Take 500 mg by mouth in the morning      enoxaparin (Lovenox) 40 mg/0.4 mL Inject 0.4 mL (40 mg total) under the skin in the morning 12 mL 9    Ferrous Sulfate (Iron) 28 MG TABS Take by mouth      folic acid (FOLVITE) 800 MCG tablet Take 800 mcg by mouth daily      MAGNESIUM PO Take by mouth      metoclopramide (Reglan) 10  "mg tablet Take 1 tablet (10 mg total) by mouth every 6 (six) hours 30 tablet 0    Prenatal Vit-Fe Fumarate-FA (PRENATAL VITAMIN PO) Take by mouth      Pyridoxine HCl (VITAMIN B-6 PO) Take by mouth       No current facility-administered medications for this visit.        Allergies   Allergen Reactions    Sprintec 28 [Norgestimate-Eth Estradiol] Other (See Comments)     Blood clot in eye    Sulfa Antibiotics Rash       Review of Systems    Video Exam    There were no vitals filed for this visit.    Physical Exam     Behavioral Health Psychotherapy Progress Note    Psychotherapy Provided: Individual Psychotherapy     1. Anxiety during pregnancy            Goals addressed in session: Goal 1     DATA:   During this session, this clinician used the following therapeutic modalities: Engagement Strategies, Client-centered Therapy, Mindfulness-based Strategies, and Supportive Psychotherapy    Substance Abuse was not addressed during this session. If the client is diagnosed with a co-occurring substance use disorder, please indicate any changes in the frequency or amount of use: na. Stage of change for addressing substance use diagnoses: No substance use/Not applicable  a  ASSESSMENT:  Sanchez Shields presents with a Euthymic/ normal mood.     her affect is Normal range and intensity, which is congruent, with her mood and the content of the session. The client has made progress on their goals.     Sanchez Shields presents with a none risk of suicide, none risk of self-harm, and none risk of harm to others.    For any risk assessment that surpasses a \"low\" rating, a safety plan must be developed.    A safety plan was indicated: no  If yes, describe in detail na    PLAN: Between sessions, Sanchez Shields will continue to utilize coping skills. At the next session, the therapist will use Engagement Strategies, Client-centered Therapy, Mindfulness-based Strategies, and Supportive Psychotherapy to address anxiety.    Behavioral Health Treatment " Plan and Discharge Planning: Sanchez Shields is aware of and agrees to continue to work on their treatment plan. They have identified and are working toward their discharge goals. yes    Visit start and stop times:    03/06/24  Start Time: 0800  Stop Time: 0844  Total Visit Time: 44 minutes

## 2024-03-07 NOTE — PROGRESS NOTES
Prenatal Visit   38w1d  Induction scheduled     PN1 completed  Nuchal completed   AFP completed  Level 2 completed  28 week labs completed   Consents signed prev  Breast pump ordered prev  Tdap UTD  Cervix check today     Denies LOF, VB, or CTX.  Pt has +FM  Patients urine is   Patient has no concerns today

## 2024-03-08 ENCOUNTER — ROUTINE PRENATAL (OUTPATIENT)
Dept: OBGYN CLINIC | Facility: CLINIC | Age: 29
End: 2024-03-08

## 2024-03-08 VITALS — BODY MASS INDEX: 31.78 KG/M2 | DIASTOLIC BLOOD PRESSURE: 68 MMHG | WEIGHT: 191 LBS | SYSTOLIC BLOOD PRESSURE: 98 MMHG

## 2024-03-08 DIAGNOSIS — F41.9 ANXIETY DURING PREGNANCY: ICD-10-CM

## 2024-03-08 DIAGNOSIS — Z3A.38 38 WEEKS GESTATION OF PREGNANCY: Primary | ICD-10-CM

## 2024-03-08 DIAGNOSIS — O99.340 ANXIETY DURING PREGNANCY: ICD-10-CM

## 2024-03-08 DIAGNOSIS — H34.8192 CENTRAL RETINAL VEIN OCCLUSION, UNSPECIFIED COMPLICATION STATUS, UNSPECIFIED LATERALITY: ICD-10-CM

## 2024-03-08 PROBLEM — Z3A.12 12 WEEKS GESTATION OF PREGNANCY: Status: ACTIVE | Noted: 2024-03-08

## 2024-03-08 PROBLEM — Z3A.12 12 WEEKS GESTATION OF PREGNANCY: Status: RESOLVED | Noted: 2024-03-08 | Resolved: 2024-03-08

## 2024-03-08 RX ORDER — ENOXAPARIN SODIUM 100 MG/ML
40 INJECTION SUBCUTANEOUS DAILY
Qty: 12 ML | Refills: 2 | Status: ON HOLD | OUTPATIENT
Start: 2024-03-08 | End: 2024-06-06

## 2024-03-08 NOTE — PROGRESS NOTES
38 weeks gestation of pregnancy  Sanchez  is a 29 y.o.  @38w1d who presents for routine prenatal visit.      IOL scheduled for 3/17/24 into 3/18/24. Cervix 1-1.5/60 so will keep with ripening.    Growth scan- EFW49% at 30 wks  28 week labs- complete and wnl  TDAP up to date  GBS negative  Delivery consents and plan in media    Reports good fetal movement.  Denies LOF, vaginal bleeding, regular uterine contractions, cramping, headaches or visual changes.      Reviewed PTL/Labor precautions and FKC.        Anxiety during pregnancy  Follows with baby and me for talk therapy  She is doing well presently    Central retinal vein occlusion  Continue Lovenox 40 mg QD  IOL scheduled 3/17/24 into 3/18/24  Advised to stop Lovenox 24 hours prior to IOL

## 2024-03-08 NOTE — ASSESSMENT & PLAN NOTE
Continue Lovenox 40 mg QD  IOL scheduled 3/17/24 into 3/18/24  Advised to stop Lovenox 24 hours prior to IOL

## 2024-03-08 NOTE — ASSESSMENT & PLAN NOTE
Sanchez  is a 29 y.o.  @38w1d who presents for routine prenatal visit.      IOL scheduled for 3/17/24 into 3/18/24. Cervix 1-1.5/60 so will keep with ripening.    Growth scan- EFW49% at 30 wks  28 week labs- complete and wnl  TDAP up to date  GBS negative  Delivery consents and plan in media    Reports good fetal movement.  Denies LOF, vaginal bleeding, regular uterine contractions, cramping, headaches or visual changes.      Reviewed PTL/Labor precautions and FKC.

## 2024-03-13 ENCOUNTER — ROUTINE PRENATAL (OUTPATIENT)
Dept: OBGYN CLINIC | Facility: CLINIC | Age: 29
End: 2024-03-13
Payer: COMMERCIAL

## 2024-03-13 VITALS
HEART RATE: 92 BPM | DIASTOLIC BLOOD PRESSURE: 70 MMHG | WEIGHT: 192 LBS | OXYGEN SATURATION: 98 % | BODY MASS INDEX: 31.95 KG/M2 | SYSTOLIC BLOOD PRESSURE: 110 MMHG

## 2024-03-13 DIAGNOSIS — Z3A.38 38 WEEKS GESTATION OF PREGNANCY: Primary | ICD-10-CM

## 2024-03-13 DIAGNOSIS — F41.9 ANXIETY DURING PREGNANCY: ICD-10-CM

## 2024-03-13 DIAGNOSIS — O99.340 ANXIETY DURING PREGNANCY: ICD-10-CM

## 2024-03-13 DIAGNOSIS — H34.8192 CENTRAL RETINAL VEIN OCCLUSION, UNSPECIFIED COMPLICATION STATUS, UNSPECIFIED LATERALITY: ICD-10-CM

## 2024-03-13 LAB
SL AMB  POCT GLUCOSE, UA: NORMAL
SL AMB POCT URINE PROTEIN: NORMAL

## 2024-03-13 PROCEDURE — 81002 URINALYSIS NONAUTO W/O SCOPE: CPT | Performed by: PHYSICIAN ASSISTANT

## 2024-03-13 PROCEDURE — 99213 OFFICE O/P EST LOW 20 MIN: CPT | Performed by: PHYSICIAN ASSISTANT

## 2024-03-13 NOTE — ASSESSMENT & PLAN NOTE
Sanchez  is a 29 y.o.  @38w6d who presents for routine prenatal visit.      IOL scheduled for 3/17/24 into 3/18/24. Cervix 1-1.5/60 so will keep with ripening. No IOL consents on file. Signed today     Growth scan- EFW49% at 30 wks  28 week labs- complete and wnl  TDAP up to date  GBS negative  Delivery consents and plan in media    Reports good fetal movement.  Denies LOF, vaginal bleeding, regular uterine contractions, cramping, headaches or visual changes.      Reviewed PTL/Labor precautions and FKC.

## 2024-03-13 NOTE — PROGRESS NOTES
38 weeks gestation of pregnancy  Sanchez  is a 29 y.o.  @38w6d who presents for routine prenatal visit.      IOL scheduled for 3/17/24 into 3/18/24. Cervix 1-1.5/60 so will keep with ripening. No IOL consents on file. Signed today     Growth scan- EFW49% at 30 wks  28 week labs- complete and wnl  TDAP up to date  GBS negative  Delivery consents and plan in media    Reports good fetal movement.  Denies LOF, vaginal bleeding, regular uterine contractions, cramping, headaches or visual changes.      Reviewed PTL/Labor precautions and FKC.        Anxiety during pregnancy  Follows with baby and me for talk therapy  She is doing well presently    Central retinal vein occlusion  Continue Lovenox 40 mg QD  IOL scheduled 3/17/24 into 3/18/24  Advised to stop Lovenox 24 hours prior to IOL

## 2024-03-17 ENCOUNTER — HOSPITAL ENCOUNTER (INPATIENT)
Facility: HOSPITAL | Age: 29
LOS: 3 days | Discharge: HOME | DRG: 560 | End: 2024-03-20
Attending: OBSTETRICS & GYNECOLOGY | Admitting: STUDENT IN AN ORGANIZED HEALTH CARE EDUCATION/TRAINING PROGRAM
Payer: COMMERCIAL

## 2024-03-17 ENCOUNTER — NURSE TRIAGE (OUTPATIENT)
Dept: OTHER | Facility: OTHER | Age: 29
End: 2024-03-17

## 2024-03-17 ENCOUNTER — HOSPITAL ENCOUNTER (OUTPATIENT)
Dept: LABOR AND DELIVERY | Facility: HOSPITAL | Age: 29
Discharge: HOME | DRG: 560 | End: 2024-03-17
Payer: COMMERCIAL

## 2024-03-17 DIAGNOSIS — Z34.90 ENCOUNTER FOR ELECTIVE INDUCTION OF LABOR: Primary | ICD-10-CM

## 2024-03-17 DIAGNOSIS — Z3A.38 38 WEEKS GESTATION OF PREGNANCY: ICD-10-CM

## 2024-03-17 PROBLEM — Z3A.39 39 WEEKS GESTATION OF PREGNANCY: Status: ACTIVE | Noted: 2023-09-05

## 2024-03-17 LAB
ABO GROUP BLD: NORMAL
BLD GP AB SCN SERPL QL: NEGATIVE
ERYTHROCYTE [DISTWIDTH] IN BLOOD BY AUTOMATED COUNT: 13.1 % (ref 11.6–15.1)
HCT VFR BLD AUTO: 41.9 % (ref 34.8–46.1)
HGB BLD-MCNC: 14.6 G/DL (ref 11.5–15.4)
HOLD SPECIMEN: NORMAL
MCH RBC QN AUTO: 30.9 PG (ref 26.8–34.3)
MCHC RBC AUTO-ENTMCNC: 34.8 G/DL (ref 31.4–37.4)
MCV RBC AUTO: 89 FL (ref 82–98)
PLATELET # BLD AUTO: 182 THOUSANDS/UL (ref 149–390)
PMV BLD AUTO: 12.3 FL (ref 8.9–12.7)
RBC # BLD AUTO: 4.72 MILLION/UL (ref 3.81–5.12)
RH BLD: POSITIVE
SPECIMEN EXPIRATION DATE: NORMAL
WBC # BLD AUTO: 13.23 THOUSAND/UL (ref 4.31–10.16)

## 2024-03-17 PROCEDURE — 86901 BLOOD TYPING SEROLOGIC RH(D): CPT

## 2024-03-17 PROCEDURE — 86850 RBC ANTIBODY SCREEN: CPT

## 2024-03-17 PROCEDURE — 86780 TREPONEMA PALLIDUM: CPT

## 2024-03-17 PROCEDURE — NC001 PR NO CHARGE: Performed by: OBSTETRICS & GYNECOLOGY

## 2024-03-17 PROCEDURE — 85027 COMPLETE CBC AUTOMATED: CPT

## 2024-03-17 PROCEDURE — 4A1HXCZ MONITORING OF PRODUCTS OF CONCEPTION, CARDIAC RATE, EXTERNAL APPROACH: ICD-10-PCS | Performed by: OBSTETRICS & GYNECOLOGY

## 2024-03-17 PROCEDURE — 86900 BLOOD TYPING SEROLOGIC ABO: CPT

## 2024-03-17 RX ORDER — SODIUM CHLORIDE, SODIUM LACTATE, POTASSIUM CHLORIDE, CALCIUM CHLORIDE 600; 310; 30; 20 MG/100ML; MG/100ML; MG/100ML; MG/100ML
125 INJECTION, SOLUTION INTRAVENOUS CONTINUOUS
Status: DISCONTINUED | OUTPATIENT
Start: 2024-03-17 | End: 2024-03-18

## 2024-03-17 RX ORDER — BUPIVACAINE HYDROCHLORIDE 2.5 MG/ML
30 INJECTION, SOLUTION EPIDURAL; INFILTRATION; INTRACAUDAL ONCE AS NEEDED
Status: DISCONTINUED | OUTPATIENT
Start: 2024-03-17 | End: 2024-03-18

## 2024-03-17 RX ADMIN — SODIUM CHLORIDE, SODIUM LACTATE, POTASSIUM CHLORIDE, AND CALCIUM CHLORIDE 125 ML/HR: .6; .31; .03; .02 INJECTION, SOLUTION INTRAVENOUS at 21:00

## 2024-03-17 NOTE — TELEPHONE ENCOUNTER
"Regarding: labor concerns  ----- Message from Faye Moreno sent at 3/17/2024 10:11 AM EDT -----  \"I am scheduled for an induction and I was told if I was feeling like I might be going into labor, to not take the blood thinner that I am on the night before. I had lower back pain yesterday so I didn't take it yesterday just in case something happened overnight.\"    "

## 2024-03-17 NOTE — H&P
"H & P- Obstetrics   Sanchez Shields 29 y.o. female MRN: [id number]  Unit/Bed#: -01 Encounter: [id number]    Assessment: 29 y.o.  at 39w3d admitted for elective induction of labor.  SVE: 1.5/50/-3  FHT: cat1  Clinical EFW: 49%ile; Cephalic confirmed by TAUS  GBS status: negative     Plan:   * Encounter for elective induction of labor  Assessment & Plan  Admitted for eIOL  Admission labs - CBC, T&S, syphilis screen  Clear liquid diet  Anesthesia consult placed   Rh+, Rhogam not indicated   GBS-, prophylaxis not indicated  Plan for FB/cytotec with transition to pitocin titration      Anxiety during pregnancy  Assessment & Plan  F/u EPDS    39 weeks gestation of pregnancy  Assessment & Plan  Up to date on prenatal care   A+  GBS-  EFW [place] 4 1685 grams - 3 lbs 11 oz (49%)   MTHFR heterozygote with remote history of clot    Heterozygous for MTHFR gene mutation  Assessment & Plan  On lovenox throughout pregnancy  Stopped lovenox  >24 hours prior to induction       Central retinal vein occlusion  Assessment & Plan  Occurred in  while on hormonal birth control   On Lovenox throughout pregnancy    Mild intermittent asthma without complication  Assessment & Plan  Avoid hemabate      Discussed case and plan w/ Dr. Hall.    Chief Complaint: \"I'm here for my induction.\"     HPI: Sanchez Shields is a 29 y.o.  with an DANDY of 3/21/2024, by Last Menstrual Period at 39w3d who is being admitted for scheduled elective induction of labor. She denies feeling any uterine contractions, has no LOF, and reports no VB. She states she has felt good FM.    Patient Active Problem List   Diagnosis    Mild intermittent asthma without complication    Central retinal vein occlusion    Heterozygous for MTHFR gene mutation    39 weeks gestation of pregnancy    Pregnancy headache in second trimester    Anxiety during pregnancy    RUQ pain    Encounter for elective induction of labor       Baby complications/comments: " none.    Review of Systems   Constitutional:  Negative for chills and fever.   Respiratory:  Negative for cough and shortness of breath.    Gastrointestinal:  Negative for diarrhea, nausea and vomiting.   Genitourinary:  Negative for dysuria and hematuria.   Skin:  Negative for color change and rash.   Neurological:  Negative for dizziness, syncope and headaches.       OB Hx:  OB History    Para Term  AB Living   1 0 0 0 0 0   SAB IAB Ectopic Multiple Live Births   0 0 0 0 0      # Outcome Date GA Lbr Dipesh/2nd Weight Sex Delivery Anes PTL Lv   1 Current                Past Medical Hx:  Past Medical History:   Diagnosis Date    Acute sinusitis 2019    Acute suppurative otitis media of right ear without spontaneous rupture of tympanic membrane 02/15/2019    Asthma     stable on albuterol inhaler    Central retinal vein occlusion of left eye     vision spared, some spots    Situational anxiety 2018    managed with no medications currently    Viral URI with cough 2018       Past Surgical hx:  Past Surgical History:   Procedure Laterality Date    CHOLECYSTECTOMY  2017    WISDOM TOOTH EXTRACTION         Social Hx:  Alcohol use: denies  Tobacco use: denies  Other substance use: denies    Allergies   Allergen Reactions    Sprintec 28 [Norgestimate-Eth Estradiol] Other (See Comments)     Blood clot in eye    Sulfa Antibiotics Rash       Medications Prior to Admission   Medication    Calcium Carbonate (CALCIUM 500 PO)    enoxaparin (Lovenox) 40 mg/0.4 mL    Ferrous Sulfate (Iron) 28 MG TABS    folic acid (FOLVITE) 800 MCG tablet    MAGNESIUM PO    metoclopramide (Reglan) 10 mg tablet    Prenatal Vit-Fe Fumarate-FA (PRENATAL VITAMIN PO)    Pyridoxine HCl (VITAMIN B-6 PO)       Objective:  Temp:  [98.7 °F (37.1 °C)] 98.7 °F (37.1 °C)  HR:  [80-91] 80  Resp:  [16] 16  BP: (113)/(67) 113/67  There is no height or weight on file to calculate BMI.     Physical Exam:  Physical  Exam  Constitutional:       Appearance: Normal appearance.   Genitourinary:      Vulva normal.   Cardiovascular:      Rate and Rhythm: Normal rate.   Pulmonary:      Effort: Pulmonary effort is normal.   Abdominal:      Palpations: Abdomen is soft.   Neurological:      General: No focal deficit present.      Mental Status: She is alert and oriented to person, place, and time.   Skin:     General: Skin is dry.   Psychiatric:         Mood and Affect: Mood normal.        FHT:  Baseline Rate (FHR): 135 bpm  Variability: Moderate  Accelerations: 15 x 15 or greater  Decelerations: None  FHR Category: Category I    TOCO:   Contraction Frequency (minutes): 3-4    Lab Results   Component Value Date    WBC 13.23 (H) 03/17/2024    HGB 14.6 03/17/2024    HCT 41.9 03/17/2024     03/17/2024     Lab Results   Component Value Date    K 4.1 01/30/2024     01/30/2024    CO2 22 01/30/2024    BUN 8 01/30/2024    CREATININE 0.50 (L) 01/30/2024    AST 15 01/30/2024    ALT 12 01/30/2024     Prenatal Labs: Reviewed     Blood type: +  Antibody: negative  GBS: negative  HIV: non-reactive  Rubella: immune  Syphilis IgM/IgG: non-reactive  HBsAg: non-reactive  HCAb: non-reactive  Chlamydia: negative  Gonorrhea: negative  Diabetes 1 hour screen: passed  3 hour glucose: n/a  Platelets: 203, pending admission CBC  Hgb: 14.3, pending admission CBC  >2 Midnights  INPATIENT     Signature/Title: Kassidy Vincent MD  Date: 3/17/2024  Time: 9:07 PM

## 2024-03-17 NOTE — ASSESSMENT & PLAN NOTE
On lovenox throughout pregnancy  Stopped lovenox  >24 hours prior to induction. Patients last Lovenox dose prior to induction was on 3/15 9PM.   Prophylactic lovenox starting 3/19 AM, continue for 6 weeks postpartum

## 2024-03-17 NOTE — ASSESSMENT & PLAN NOTE
Admitted for eIOL  Admission labs - CBC, T&S, syphilis screen  Clear liquid diet  Anesthesia consult placed   Rh+, Rhogam not indicated   GBS-, prophylaxis not indicated  Plan for FB/cytotec with transition to pitocin titration

## 2024-03-17 NOTE — TELEPHONE ENCOUNTER
"Answer Assessment - Initial Assessment Questions  1. ONSET: \"When did the pain begin?\"       Since last night    2. LOCATION: \"Where does it hurt?\" (upper, mid or lower back)      Lower back    3. SEVERITY: \"How bad is the pain?\"  (e.g., Scale 1-10; mild, moderate, or severe)    - MILD (1-3): doesn't interfere with normal activities     - MODERATE (4-7): interferes with normal activities or awakens from sleep     - SEVERE (8-10): excruciating pain, unable to do any normal activities       Mild- 2/10      4. PATTERN: \"Is the pain constant?\" (e.g., yes, no; constant, intermittent)       Inconsistent, irregular    5. RADIATION: \"Does the pain shoot into your legs or elsewhere?\"      Denies    6. CAUSE:  \"What do you think is causing the back pain?\"       Labor? [place] lindo    7. BACK OVERUSE:  \"Any recent lifting of heavy objects, strenuous work or exercise?\"      No    8. MEDICATIONS: \"What have you taken so far for the pain?\" (e.g., nothing, acetaminophen)      Held her Lovenox last night.     9. NEUROLOGIC SYMPTOMS: \"Do you have any weakness, numbness, or problems with bowel/bladder control?\"      No    10. OTHER SYMPTOMS: \"Do you have any other symptoms?\" (e.g., fever, abdominal pain, burning with urination, blood in urine, fluid leaking from vagina)        No vaginal bleeding. No fluid leakage. Normal FM.     11. DANDY: \"What date are you expecting to deliver?\"        Pt is scheduled to cervical ripening tonight and induction for tomorrow.    Protocols used: Pregnancy - Back Pain-ADULT-      Pt states she wanted to call in to make on call aware that she held her Lovenox last night due to the lower back pain she was having. Says she was told she can hold if she felt necessary but to call to make provider aware.       Per Dr. Hall- last night was supposed to be pts last dose of Lovenox so would not resume lovenox at this time. If pt develops more regular contractions or pain then can come in earlier to " scheduled IOL.     Recommendation given to pt who verbalized understanding.

## 2024-03-18 ENCOUNTER — ANESTHESIA EVENT (INPATIENT)
Dept: ANESTHESIOLOGY | Facility: HOSPITAL | Age: 29
DRG: 560 | End: 2024-03-18
Payer: COMMERCIAL

## 2024-03-18 ENCOUNTER — ANESTHESIA (INPATIENT)
Dept: ANESTHESIOLOGY | Facility: HOSPITAL | Age: 29
DRG: 560 | End: 2024-03-18
Payer: COMMERCIAL

## 2024-03-18 PROBLEM — Z3A.39 39 WEEKS GESTATION OF PREGNANCY: Chronic | Status: ACTIVE | Noted: 2023-09-05

## 2024-03-18 LAB
BASE EXCESS BLDCOA CALC-SCNC: -5.9 MMOL/L (ref 3–11)
BASE EXCESS BLDCOV CALC-SCNC: -6.8 MMOL/L (ref 1–9)
HCO3 BLDCOA-SCNC: 22.5 MMOL/L (ref 17.3–27.3)
HCO3 BLDCOV-SCNC: 19.5 MMOL/L (ref 12.2–28.6)
O2 CT VFR BLDCOA CALC: 7.6 ML/DL
OXYHGB MFR BLDCOA: 36.6 %
OXYHGB MFR BLDCOV: 57 %
PCO2 BLDCOA: 56.1 MM[HG] (ref 30–60)
PCO2 BLDCOV: 41.9 MM HG (ref 27–43)
PH BLDCOA: 7.22 [PH] (ref 7.23–7.43)
PH BLDCOV: 7.29 [PH] (ref 7.19–7.49)
PO2 BLDCOA: 20.5 MM HG (ref 5–25)
PO2 BLDCOV: 26.8 MM HG (ref 15–45)
SAO2 % BLDCOV: 11.6 ML/DL
TREPONEMA PALLIDUM IGG+IGM AB [PRESENCE] IN SERUM OR PLASMA BY IMMUNOASSAY: NORMAL

## 2024-03-18 PROCEDURE — 82805 BLOOD GASES W/O2 SATURATION: CPT | Performed by: OBSTETRICS & GYNECOLOGY

## 2024-03-18 PROCEDURE — 59409 OBSTETRICAL CARE: CPT | Performed by: OBSTETRICS & GYNECOLOGY

## 2024-03-18 PROCEDURE — 0HQ9XZZ REPAIR PERINEUM SKIN, EXTERNAL APPROACH: ICD-10-PCS | Performed by: OBSTETRICS & GYNECOLOGY

## 2024-03-18 RX ORDER — ENOXAPARIN SODIUM 100 MG/ML
40 INJECTION SUBCUTANEOUS
Status: DISCONTINUED | OUTPATIENT
Start: 2024-03-19 | End: 2024-03-20 | Stop reason: HOSPADM

## 2024-03-18 RX ORDER — CARBOPROST TROMETHAMINE 250 UG/ML
INJECTION, SOLUTION INTRAMUSCULAR
Status: DISCONTINUED
Start: 2024-03-18 | End: 2024-03-18 | Stop reason: WASHOUT

## 2024-03-18 RX ORDER — EPHEDRINE SULFATE 50 MG/ML
INJECTION INTRAVENOUS AS NEEDED
Status: DISCONTINUED | OUTPATIENT
Start: 2024-03-18 | End: 2024-03-18 | Stop reason: HOSPADM

## 2024-03-18 RX ORDER — POLYETHYLENE GLYCOL 3350 17 G/17G
17 POWDER, FOR SOLUTION ORAL DAILY
Status: DISCONTINUED | OUTPATIENT
Start: 2024-03-18 | End: 2024-03-20 | Stop reason: HOSPADM

## 2024-03-18 RX ORDER — ACETAMINOPHEN 325 MG/1
650 TABLET ORAL EVERY 4 HOURS PRN
Status: DISCONTINUED | OUTPATIENT
Start: 2024-03-18 | End: 2024-03-20 | Stop reason: HOSPADM

## 2024-03-18 RX ORDER — CALCIUM CARBONATE 500 MG/1
1000 TABLET, CHEWABLE ORAL DAILY PRN
Status: DISCONTINUED | OUTPATIENT
Start: 2024-03-18 | End: 2024-03-20 | Stop reason: HOSPADM

## 2024-03-18 RX ORDER — PHENYLEPHRINE HCL IN 0.9% NACL 1 MG/10 ML
SYRINGE (ML) INTRAVENOUS AS NEEDED
Status: DISCONTINUED | OUTPATIENT
Start: 2024-03-18 | End: 2024-03-18 | Stop reason: HOSPADM

## 2024-03-18 RX ORDER — DIPHENHYDRAMINE HCL 25 MG
25 TABLET ORAL EVERY 6 HOURS PRN
Status: DISCONTINUED | OUTPATIENT
Start: 2024-03-18 | End: 2024-03-20 | Stop reason: HOSPADM

## 2024-03-18 RX ORDER — BENZOCAINE/MENTHOL 6 MG-10 MG
1 LOZENGE MUCOUS MEMBRANE DAILY PRN
Status: DISCONTINUED | OUTPATIENT
Start: 2024-03-18 | End: 2024-03-20 | Stop reason: HOSPADM

## 2024-03-18 RX ORDER — OXYTOCIN/RINGER'S LACTATE 30/500 ML
1-30 PLASTIC BAG, INJECTION (ML) INTRAVENOUS
Status: DISCONTINUED | OUTPATIENT
Start: 2024-03-18 | End: 2024-03-18

## 2024-03-18 RX ORDER — SIMETHICONE 80 MG
80 TABLET,CHEWABLE ORAL 4 TIMES DAILY PRN
Status: DISCONTINUED | OUTPATIENT
Start: 2024-03-18 | End: 2024-03-20 | Stop reason: HOSPADM

## 2024-03-18 RX ORDER — METHYLERGONOVINE MALEATE 0.2 MG/ML
INJECTION INTRAVENOUS
Status: DISCONTINUED
Start: 2024-03-18 | End: 2024-03-18 | Stop reason: WASHOUT

## 2024-03-18 RX ORDER — IBUPROFEN 600 MG/1
600 TABLET ORAL EVERY 6 HOURS
Status: DISCONTINUED | OUTPATIENT
Start: 2024-03-18 | End: 2024-03-20 | Stop reason: HOSPADM

## 2024-03-18 RX ORDER — OXYTOCIN/RINGER'S LACTATE 30/500 ML
250 PLASTIC BAG, INJECTION (ML) INTRAVENOUS ONCE
Status: DISCONTINUED | OUTPATIENT
Start: 2024-03-18 | End: 2024-03-20 | Stop reason: HOSPADM

## 2024-03-18 RX ORDER — LIDOCAINE HYDROCHLORIDE AND EPINEPHRINE 15; 5 MG/ML; UG/ML
INJECTION, SOLUTION EPIDURAL
Status: COMPLETED | OUTPATIENT
Start: 2024-03-18 | End: 2024-03-18

## 2024-03-18 RX ORDER — ONDANSETRON 2 MG/ML
4 INJECTION INTRAMUSCULAR; INTRAVENOUS EVERY 8 HOURS PRN
Status: DISCONTINUED | OUTPATIENT
Start: 2024-03-18 | End: 2024-03-20 | Stop reason: HOSPADM

## 2024-03-18 RX ADMIN — IBUPROFEN 600 MG: 600 TABLET ORAL at 12:33

## 2024-03-18 RX ADMIN — ACETAMINOPHEN 650 MG: 325 TABLET, FILM COATED ORAL at 22:03

## 2024-03-18 RX ADMIN — EPHEDRINE SULFATE 25 MG: 50 INJECTION INTRAVENOUS at 05:48

## 2024-03-18 RX ADMIN — ROPIVACAINE HYDROCHLORIDE: 2 INJECTION, SOLUTION EPIDURAL; INFILTRATION at 08:26

## 2024-03-18 RX ADMIN — HYDROCORTISONE 1 APPLICATION: 1 CREAM TOPICAL at 12:33

## 2024-03-18 RX ADMIN — WITCH HAZEL 1 PAD: 500 SOLUTION RECTAL; TOPICAL at 12:33

## 2024-03-18 RX ADMIN — ACETAMINOPHEN 650 MG: 325 TABLET, FILM COATED ORAL at 12:33

## 2024-03-18 RX ADMIN — Medication 100 MCG: at 01:59

## 2024-03-18 RX ADMIN — ROPIVACAINE HYDROCHLORIDE: 2 INJECTION, SOLUTION EPIDURAL; INFILTRATION at 01:49

## 2024-03-18 RX ADMIN — BENZOCAINE AND LEVOMENTHOL 1 APPLICATION: 200; 5 SPRAY TOPICAL at 12:33

## 2024-03-18 RX ADMIN — LIDOCAINE HYDROCHLORIDE AND EPINEPHRINE 3 ML: 15; 5 INJECTION, SOLUTION EPIDURAL at 01:35

## 2024-03-18 RX ADMIN — SODIUM CHLORIDE, SODIUM LACTATE, POTASSIUM CHLORIDE, AND CALCIUM CHLORIDE 125 ML/HR: .6; .31; .03; .02 INJECTION, SOLUTION INTRAVENOUS at 09:36

## 2024-03-18 RX ADMIN — SODIUM CHLORIDE, SODIUM LACTATE, POTASSIUM CHLORIDE, AND CALCIUM CHLORIDE 125 ML/HR: .6; .31; .03; .02 INJECTION, SOLUTION INTRAVENOUS at 01:01

## 2024-03-18 RX ADMIN — LIDOCAINE HYDROCHLORIDE AND EPINEPHRINE 2 ML: 15; 5 INJECTION, SOLUTION EPIDURAL at 01:37

## 2024-03-18 RX ADMIN — EPHEDRINE SULFATE 10 MG: 50 INJECTION INTRAVENOUS at 01:58

## 2024-03-18 RX ADMIN — Medication 100 MCG: at 05:46

## 2024-03-18 RX ADMIN — SODIUM CHLORIDE, SODIUM LACTATE, POTASSIUM CHLORIDE, AND CALCIUM CHLORIDE 125 ML/HR: .6; .31; .03; .02 INJECTION, SOLUTION INTRAVENOUS at 03:55

## 2024-03-18 RX ADMIN — Medication 2 MILLI-UNITS/MIN: at 02:45

## 2024-03-18 RX ADMIN — IBUPROFEN 600 MG: 600 TABLET ORAL at 19:45

## 2024-03-18 NOTE — L&D DELIVERY NOTE
Vaginal Delivery Summary - OB/GYN   Sanchez Shields 29 y.o. female MRN: [id number]  Unit/Bed#: -01 Encounter: [id number]    Pre-delivery Diagnosis:   Pregnancy at 39 weeks gestation   Heterozygous for MTHFR gene mutation  Mild Intermittent Asthma  History of central retinal vein occlusion      Post-delivery Diagnosis:   Same, delivered    Procedure: Spontaneous Vaginal Delivery     Attending Physician: Dr. Naranjo  Resident Physician: Dr. Mara Haines    Anesthesia: Epidural    QBL: 156 cc  Admission H.6 g/dL  Admission platelets: 182 thousands/uL    Complications: none apparent    Specimens:   1. Arterial and venous cord gases  2. Cord blood  3. Segment of umbilical cord  4. Placenta to storage     Findings:  1. Viable female on 3/18/2024 at 10:57 AM , with APGARS of 9  and 9  at 1 and 5 minutes respectively. Weight pending at time of dictation for skin to skin bonding.  2. Spontaneous delivery of intact placenta at 1102  3. 1 degree laceration repaired with 3-0 Vicryl Rapide    Gases:  Umbilical Artery  Recent Labs     24  1104   PHCART 7.221*   BECART -5.9*       Umbilical Vein  Recent Labs     24  1104   PHCVEN 7.286   BECVEN -6.8*         Brief history and labor course:  Sanchez Shields is now a 29 y.o. [id number] 39w4d who presented to labor and delivery for elective induction of labor. Her pregnancy was uncomplicated, but her history is notable for heterozygous status of the MTHFR gene with a history of central retinal vein occlusion for which was was taking prophylactic Lovenox throughout her pregnancy. On exam, she was noted to be 1.5/70/-3.  She was induced with a mota balloon and pitocin. She received an epidural and spontaneously rupture for clear fluid. She progressed to complete cervical dilation and began pushing.    Description of delivery:    After pushing for 26 minutes, Sanchez delivered a viable female , wt pending as mother is doing skin to skin bonding. The fetal vertex  delivered DIANELYS position spontaneously. There was no nuchal cord. The anterior shoulder delivered atraumatically with maternal expulsive forces and the assistance of gentle downward traction. The posterior shoulder delivered with maternal expulsive forces and the assistance of gentle upward traction. The remainder of the fetus delivered spontaneously. Terminal meconium was noted upon delivery.    Upon delivery, the infant was placed on the mothers abdomen and the cord was doubly clamped and cut. Delayed cord clamping was achieved.The infant was noted to cry spontaneously and was moving all extremities appropriately. There was no evidence for injury. Nurse resuscitators evaluating the . Arterial and venous cord blood gases and cord blood was collected for analysis. These were promptly sent to the lab. In the immediate post-partum, active management of the 3rd stage of labor was performed with massage, the administration of 30 units of IV pitocin, and gentle traction on the umbilical cord. The placenta delivered spontaneously and was noted to have a centrally inserted 3 vessel cord.  The placenta was sent to storage.    The vagina, cervix, perineum, and rectum were inspected and there was noted to be a 1st degree laceration.    Laceration Repair    The patient was comfortable with epidural for analgesia. The apex of the vaginal laceration was identified and a suture of 3-0 Vicryl Rapide was placed 1cm above the apex. The vaginal mucosa was closed in running, locked fashion.  The suture was then brought underneath the hymenal ring. The suture was brought to the posterior apex of the skin laceration and then the skin was re-approximated in a subcuticular fashion to the hymenal ring. Hemostasis was achieved.    Bimanual exam revealed minimal clots and good uterine tone.  The fundus was firm and at the level of the umbilicus.  At the conclusion of the procedure, all needle, sponge, and instrument counts were noted to  be correct. Patient tolerated the procedure well and was allowed to recover in labor and delivery room with family and  before being transferred to the post-partum floor. Dr. Naranjo was present and participated in all key portions of the case.    Disposition:  The patient and the  both tolerated the procedure well and are recovering in labor and delivery room       Mara Haines MD  OB/GYN PGY-1  3/18/2024  11:27 AM

## 2024-03-18 NOTE — DISCHARGE SUMMARY
Obstetrics Discharge Summary  Sanchez Shields 29 y.o. female MRN: [id number]  Unit/Bed#: -01 Encounter: [id number]    Admission Date: 3/17/2024     Discharge Date: 3/20/2024    Admitting Attending: Dr. Naranjo  Delivery Attending: Dr. Naranjo  Discharging Attending:  Dr. Tompkins     Admitting Diagnoses:   Pregnancy at 39 weeks gestation   Heterozygous for MTHFR gene mutation  Mild Intermittent Asthma  History of central retinal vein occlusion    Discharge Diagnoses:   Same, delivered    Procedures: Spontaneous vaginal delivery    Anesthesia: epidural    Hospital course: Sanchez Shields is now a 29 y.o. [id number] who presented to labor and delivery for elective induction of labor. Her pregnancy was uncomplicated, but her history is notable for heterozygous status of the MTHFR gene with a history of central retinal vein occlusion for which was was taking prophylactic Lovenox throughout her pregnancy. On exam, she was noted to be 1.5/70/-3.  She was induced with a mota balloon and pitocin. She received an epidural and spontaneously rupture for clear fluid. She progressed to complete cervical dilation and began pushing.    Delivery Findings:  After pushing for 26 minutes, Sanchez delivered a viable female  on 3/18/2024  10:57 AM  via Vaginal, Spontaneous . The delivery was uncomplicated.  She sustained a 1st degree laceration during delivery which was adequately repaired. Patient tolerated the procedure well.  was admitted to the  nursery.    Baby's Weight: 3700 g (8 lb 2.5 oz) ; 130.51     Apgar scores: 9  and 9  at 1 and 5 minutes, respectively  QBL: Non-Surgical QBL (mL): 156        Her post-delivery course was uncomplicated. Her postpartum pain was well controlled with oral analgesics. Maternal blood type is A positive so RhoGAM was not indicated.    On day of discharge, she was ambulating and able to reasonably perform all ADLs. She was voiding and had appropriate bowel function. Pain  was well controlled. She was discharged home on postpartum day #2 without complications. Patient was instructed to follow up with her OBGYN as an outpatient and was given appropriate warnings to call provider if she develops signs of infection or uncontrolled pain.    Complications: none apparent    Condition at discharge: Good    Disposition: Home    Planned Readmission: No    Discharge Medications:   Please see AVS for a complete list of discharge medications.    Discharge instructions :   -Do not place anything (no partner, tampons or douche) in your vagina for 6 weeks  -You may walk for exercise for the first 6 weeks then gradually return to your usual activities   -Please do not drive for 1 week if you have no stitches and for 2 weeks if you have stitches    -You may take baths or shower per your preference   -Please examine your breasts in the mirror daily and call your doctor for redness or tenderness or increased warmth    -Please call your doctor's office if temperature > 100.4*F or 38* C, worsening pain or a foul discharge.    Follow Up:  - Follow up in 3 weeks for postpartum visit    Dione Kline MD  SLW  PGY 1

## 2024-03-18 NOTE — ASSESSMENT & PLAN NOTE
- Pain well controlled with oral analgesics  - Voiding spontaneously  - Tolerating PO fluids and solids  - Ambulating without difficulty  - Encourage breastfeeding and familial bonding

## 2024-03-18 NOTE — ANESTHESIA POSTPROCEDURE EVALUATION
Post-Op Assessment Note    CV Status:  Stable  Pain Score: 0    Pain management: adequate      Post-op block assessment: catheter intact and no complications   Mental Status:  Alert and awake   Hydration Status:  Euvolemic   PONV Controlled:  Controlled   Airway Patency:  Patent     Post Op Vitals Reviewed: Yes    No anethesia notable event occurred.    Staff: CRNA             BP      Temp      Pulse     Resp      SpO2

## 2024-03-18 NOTE — OB LABOR/OXYTOCIN SAFETY PROGRESS
Labor Progress Note - Sanchez Shields 29 y.o. female MRN: [id number]    Unit/Bed#: -01 Encounter: [id number]       Contraction Frequency (minutes): 3-4     Uterine Activity Characteristics: Regular  Cervical Dilation: 1-2        Cervical Effacement: 70  Fetal Station: -3  Baseline Rate (FHR): 135 bpm  Fetal Heart Rate (FHT): 132 BPM  FHR Category: 1               Vital Signs:   Vitals:    03/17/24 2038   BP: 113/67   Pulse: 80   Resp:    Temp:    SpO2:        A 24F mota with a 30cc balloon was selected. SVE was performed and cervix was located. Mota was introduced over sterile gloved hands. Balloon advanced through cervix beyond the internal cervical os. A small amount amount of sterile normal saline solution was instilled in the balloon to confirm placement. Placement was confirmed to be beyond the internal cervical os. A total of 60cc of sterile normal saline solution was instilled into the balloon. Patient tolerated well. Instructions left with RN to place mota to gravity with a 1L bag of IV fluid. Notify MD when mota dislodged.      Kassidy Vincent MD 3/17/2024 9:19 PM

## 2024-03-18 NOTE — ANESTHESIA PREPROCEDURE EVALUATION
Procedure:  LABOR ANALGESIA    Relevant Problems   GYN   (+) 39 weeks gestation of pregnancy      NEURO/PSYCH   (+) Pregnancy headache in second trimester      PULMONARY   (+) Mild intermittent asthma without complication        Physical Exam    Airway    Mallampati score: II  TM Distance: >3 FB  Neck ROM: full     Dental       Cardiovascular  Cardiovascular exam normal    Pulmonary  Pulmonary exam normal     Other Findings  post-pubertal.      Anesthesia Plan  ASA Score- 2     Anesthesia Type- epidural with ASA Monitors.         Additional Monitors:     Airway Plan:            Plan Factors-    Chart reviewed.   Existing labs reviewed. Patient summary reviewed.                  Induction-     Postoperative Plan-     Informed Consent- Anesthetic plan and risks discussed with patient.  I personally reviewed this patient with the CRNA. Discussed and agreed on the Anesthesia Plan with the CRNA..

## 2024-03-18 NOTE — PLAN OF CARE
Problem: PAIN - ADULT  Goal: Verbalizes/displays adequate comfort level or baseline comfort level  Description: Interventions:  - Encourage patient to monitor pain and request assistance  - Assess pain using appropriate pain scale  - Administer analgesics based on type and severity of pain and evaluate response  - Implement non-pharmacological measures as appropriate and evaluate response  - Consider cultural and social influences on pain and pain management  - Notify physician/advanced practitioner if interventions unsuccessful or patient reports new pain  Outcome: Progressing     Problem: INFECTION - ADULT  Goal: Absence or prevention of progression during hospitalization  Description: INTERVENTIONS:  - Assess and monitor for signs and symptoms of infection  - Monitor lab/diagnostic results  - Monitor all insertion sites, i.e. indwelling lines, tubes, and drains  - Monitor endotracheal if appropriate and nasal secretions for changes in amount and color  - [place] appropriate cooling/warming therapies per order  - Administer medications as ordered  - Instruct and encourage patient and family to use good hand hygiene technique  - Identify and instruct in appropriate isolation precautions for identified infection/condition  Outcome: Progressing  Goal: Absence of fever/infection during neutropenic period  Description: INTERVENTIONS:  - Monitor WBC    Outcome: Progressing     Problem: SAFETY ADULT  Goal: Patient will remain free of falls  Description: INTERVENTIONS:  - Educate patient/family on patient safety including physical limitations  - Instruct patient to call for assistance with activity   - Consult OT/PT to assist with strengthening/mobility   - Keep Call bell within reach  - Keep bed low and locked with side rails adjusted as appropriate  - Keep care items and personal belongings within reach  - Initiate and maintain comfort rounds  - Make Fall Risk Sign visible to staff  - Offer Toileting every 2 Hours,  in advance of need  - Initiate/Maintain alarm  - Obtain necessary fall risk management equipment:   - Apply yellow socks and bracelet for high fall risk patients  - Consider moving patient to room near nurses station  Outcome: Progressing  Goal: Maintain or return to baseline ADL function  Description: INTERVENTIONS:  -  Assess patient's ability to carry out ADLs; assess patient's baseline for ADL function and identify physical deficits which impact ability to perform ADLs (bathing, care of mouth/teeth, toileting, grooming, dressing, etc.)  - Assess/evaluate cause of self-care deficits   - Assess range of motion  - Assess patient's mobility; develop plan if impaired  - Assess patient's need for assistive devices and provide as appropriate  - Encourage maximum independence but intervene and supervise when necessary  - Involve family in performance of ADLs  - Assess for home care needs following discharge   - Consider OT consult to assist with ADL evaluation and planning for discharge  - Provide patient education as appropriate  Outcome: Progressing  Goal: Maintains/Returns to pre admission functional level  Description: INTERVENTIONS:  - Perform AM-PAC 6 Click Basic Mobility/ Daily Activity assessment daily.  - Set and communicate daily mobility goal to care team and patient/family/caregiver.   - Collaborate with rehabilitation services on mobility goals if consulted    - Out of bed for toileting  - Record patient progress and toleration of activity level   Outcome: Progressing     Problem: DISCHARGE PLANNING  Goal: Discharge to home or other facility with appropriate resources  Description: INTERVENTIONS:  - Identify barriers to discharge w/patient and caregiver  - Arrange for needed discharge resources and transportation as appropriate  - Identify discharge learning needs (meds, wound care, etc.)  - Arrange for interpretive services to assist at discharge as needed  - Refer to Case Management Department for  coordinating discharge planning if the patient needs post-hospital services based on physician/advanced practitioner order or complex needs related to functional status, cognitive ability, or social support system  Outcome: Progressing

## 2024-03-18 NOTE — PLAN OF CARE
Problem: PAIN - ADULT  Goal: Verbalizes/displays adequate comfort level or baseline comfort level  Description: Interventions:  - Encourage patient to monitor pain and request assistance  - Assess pain using appropriate pain scale  - Administer analgesics based on type and severity of pain and evaluate response  - Implement non-pharmacological measures as appropriate and evaluate response  - Consider cultural and social influences on pain and pain management  - Notify physician/advanced practitioner if interventions unsuccessful or patient reports new pain  Outcome: Progressing     Problem: INFECTION - ADULT  Goal: Absence or prevention of progression during hospitalization  Description: INTERVENTIONS:  - Assess and monitor for signs and symptoms of infection  - Monitor lab/diagnostic results  - Monitor all insertion sites, i.e. indwelling lines, tubes, and drains  - Monitor endotracheal if appropriate and nasal secretions for changes in amount and color  - [place] appropriate cooling/warming therapies per order  - Administer medications as ordered  - Instruct and encourage patient and family to use good hand hygiene technique  - Identify and instruct in appropriate isolation precautions for identified infection/condition  Outcome: Progressing  Goal: Absence of fever/infection during neutropenic period  Description: INTERVENTIONS:  - Monitor WBC    Outcome: Progressing     Problem: SAFETY ADULT  Goal: Patient will remain free of falls  Description: INTERVENTIONS:  - Educate patient/family on patient safety including physical limitations  - Instruct patient to call for assistance with activity   - Consult OT/PT to assist with strengthening/mobility   - Keep Call bell within reach  - Keep bed low and locked with side rails adjusted as appropriate  - Keep care items and personal belongings within reach  - Initiate and maintain comfort rounds  - Make Fall Risk Sign visible to staff  - Offer Toileting every  Hours,  in advance of need  - Initiate/Maintain alarm  - Obtain necessary fall risk management equipment:   - Apply yellow socks and bracelet for high fall risk patients  - Consider moving patient to room near nurses station  Outcome: Progressing  Goal: Maintain or return to baseline ADL function  Description: INTERVENTIONS:  -  Assess patient's ability to carry out ADLs; assess patient's baseline for ADL function and identify physical deficits which impact ability to perform ADLs (bathing, care of mouth/teeth, toileting, grooming, dressing, etc.)  - Assess/evaluate cause of self-care deficits   - Assess range of motion  - Assess patient's mobility; develop plan if impaired  - Assess patient's need for assistive devices and provide as appropriate  - Encourage maximum independence but intervene and supervise when necessary  - Involve family in performance of ADLs  - Assess for home care needs following discharge   - Consider OT consult to assist with ADL evaluation and planning for discharge  - Provide patient education as appropriate  Outcome: Progressing  Goal: Maintains/Returns to pre admission functional level  Description: INTERVENTIONS:  - Perform AM-PAC 6 Click Basic Mobility/ Daily Activity assessment daily.  - Set and communicate daily mobility goal to care team and patient/family/caregiver.   - Collaborate with rehabilitation services on mobility goals if consulted  - Perform Range of Motion  times a day.  - Reposition patient every  hours.  - Dangle patient  times a day  - Stand patient  times a day  - Ambulate patient  times a day  - Out of bed to chair  times a day   - Out of bed for meals  times a day  - Out of bed for toileting  - Record patient progress and toleration of activity level   Outcome: Progressing     Problem: Knowledge Deficit  Goal: Patient/family/caregiver demonstrates understanding of disease process, treatment plan, medications, and discharge instructions  Description: Complete learning  assessment and assess knowledge base.  Interventions:  - Provide teaching at level of understanding  - Provide teaching via preferred learning methods  Outcome: Progressing  Goal: Verbalizes understanding of labor plan  Description: Assess patient/family/caregiver's baseline knowledge level and ability to understand information.  Provide education via patient/family/caregiver's preferred learning method at appropriate level of understanding.     1. Provide teaching at level of understanding.  2. Provide teaching via preferred learning method(s).  Outcome: Progressing     Problem: DISCHARGE PLANNING  Goal: Discharge to home or other facility with appropriate resources  Description: INTERVENTIONS:  - Identify barriers to discharge w/patient and caregiver  - Arrange for needed discharge resources and transportation as appropriate  - Identify discharge learning needs (meds, wound care, etc.)  - Arrange for interpretive services to assist at discharge as needed  - Refer to Case Management Department for coordinating discharge planning if the patient needs post-hospital services based on physician/advanced practitioner order or complex needs related to functional status, cognitive ability, or social support system  Outcome: Progressing     Problem: Labor & Delivery  Goal: Manages discomfort  Description: Assess and monitor for signs and symptoms of discomfort.  Assess patient's pain level regularly and per hospital policy.  Administer medications as ordered. Support use of nonpharmacological methods to help control pain such as distraction, imagery, relaxation, and application of heat and cold.  Collaborate with interdisciplinary team and patient to determine appropriate pain management plan.    1. Include patient in decisions related to comfort.  2. Offer non-pharmacological pain management interventions.  3. Report ineffective pain management to physician.  Outcome: Progressing  Goal: Patient vital signs are  stable  Description: 1. Assess vital signs - vaginal delivery.  Outcome: Progressing

## 2024-03-18 NOTE — ANESTHESIA PROCEDURE NOTES
Epidural Block    Patient location during procedure: OB/L&D  Start time: 3/18/2024 1:34 AM  Reason for block: procedure for pain  Staffing  Performed by: Joey Fields MD  Authorized by: Joey Fields MD    Preanesthetic Checklist  Completed: patient identified, IV checked, site marked, risks and benefits discussed, surgical consent, monitors and equipment checked, pre-op evaluation and timeout performed  Epidural  Patient position: sitting  Prep: ChloraPrep  Sedation Level: no sedation  Patient monitoring: frequent blood pressure checks, continuous pulse oximetry and heart rate  Approach: midline  Location: lumbar, L3-4  Injection technique: RAHUL saline  Needle  Needle type: Tuohy   Needle gauge: 18 G  Needle insertion depth: 6 cm  Catheter type: multi-orifice  Catheter size: 20 G  Catheter at skin depth: 13 cm  Catheter securement method: stabilization device and clear occlusive dressing  Test dose: negativelidocaine-epinephrine (XYLOCAINE-MPF/EPINEPHRINE) 1.5 %-1:200,000 injection 3 mL - Epidural   3 mL - 3/18/2024 1:35:00 AM   2 mL - 3/18/2024 1:37:00 AM  Assessment  Sensory level: T10  Number of attempts: 2negative aspiration for CSF, negative aspiration for heme and no paresthesia on injection  patient tolerated the procedure well with no immediate complications  Additional Notes  First attempt by crna. Second by NICOLE

## 2024-03-18 NOTE — OB LABOR/OXYTOCIN SAFETY PROGRESS
Oxytocin Safety Progress Check Note - Sanchez Shields 29 y.o. female MRN: [id number]    Unit/Bed#: -01 Encounter: [id number]    Dose (edward-units/min) Oxytocin: 10 edward-units/min  Contraction Frequency (minutes): 2.5  Contraction Intensity: Moderate  Uterine Activity Characteristics: Regular  Cervical Dilation: 4        Cervical Effacement: 80  Fetal Station: -2  Baseline Rate (FHR): 140 bpm  Fetal Heart Rate (FHT): 148 BPM  FHR Category: 1               Vital Signs:   Vitals:    03/18/24 0458   BP: 96/53   Pulse: 80   Resp:    Temp:    SpO2:        Patient evaluated now 2hrs after pitocin started.  SVE unchanged, as above. FHT cat 1. Lelo q2.5. Pitocin at 8, will continue to titrate as tolerated.       Kassidy Vincent MD 3/18/2024 5:18 AM

## 2024-03-18 NOTE — OB LABOR/OXYTOCIN SAFETY PROGRESS
Labor Progress Note - Sanchez Shields 29 y.o. female MRN: [id number]    Unit/Bed#: -01 Encounter: [id number]       Contraction Frequency (minutes): 1-4  Contraction Intensity: Mild/Moderate  Uterine Activity Characteristics: Regular  Cervical Dilation: 4        Cervical Effacement: 80  Fetal Station: -2  Baseline Rate (FHR): 130 bpm  Fetal Heart Rate (FHT): 142 BPM  FHR Category: 1               Vital Signs:   Vitals:    03/17/24 2038   BP: 113/67   Pulse: 80   Resp:    Temp:    SpO2:        Vargas balloon spontaneously expelled. SVE as above. Moderate vaginal bleeding noted on cervical exam, but FHT remains cat 1. Lelo q1-4. Plan to start pitocin. D/w Dr. Hall.       Kassidy Vincent MD 3/18/2024 12:18 AM

## 2024-03-18 NOTE — OB LABOR/OXYTOCIN SAFETY PROGRESS
Oxytocin Safety Progress Check Note - Sanchez Shields 29 y.o. female MRN: [id number]    Unit/Bed#: -01 Encounter: [id number]    Dose (edward-units/min) Oxytocin: 14 edward-units/min  Contraction Frequency (minutes): 2-3  Contraction Intensity: Moderate  Uterine Activity Characteristics: Regular  Cervical Dilation: 6-7        Cervical Effacement: 80  Fetal Station: -1  Baseline Rate (FHR): 135 bpm  Fetal Heart Rate (FHT):  (unable to trace d/t maternal reposition)  FHR Category: 2               Vital Signs:   Vitals:    03/18/24 0700   BP:    Pulse:    Resp:    Temp: 97.7 °F (36.5 °C)   SpO2:        Notes/comments:   SVE as above. Patient already SROMed on check. Category II tracing for fetal tachycardia, patient afebrile. D/w Dr. Jose A Haines MD 3/18/2024 7:55 AM

## 2024-03-18 NOTE — ASSESSMENT & PLAN NOTE
Up to date on prenatal care   A+  GBS-  EFW [place] 4 1685 grams - 3 lbs 11 oz (49%)   MTHFR heterozygote with remote history of clot

## 2024-03-18 NOTE — PLAN OF CARE
Problem: PAIN - ADULT  Goal: Verbalizes/displays adequate comfort level or baseline comfort level  Description: Interventions:  - Encourage patient to monitor pain and request assistance  - Assess pain using appropriate pain scale  - Administer analgesics based on type and severity of pain and evaluate response  - Implement non-pharmacological measures as appropriate and evaluate response  - Consider cultural and social influences on pain and pain management  - Notify physician/advanced practitioner if interventions unsuccessful or patient reports new pain  Outcome: Progressing     Problem: INFECTION - ADULT  Goal: Absence or prevention of progression during hospitalization  Description: INTERVENTIONS:  - Assess and monitor for signs and symptoms of infection  - Monitor lab/diagnostic results  - Monitor all insertion sites, i.e. indwelling lines, tubes, and drains  - Monitor endotracheal if appropriate and nasal secretions for changes in amount and color  - [place] appropriate cooling/warming therapies per order  - Administer medications as ordered  - Instruct and encourage patient and family to use good hand hygiene technique  - Identify and instruct in appropriate isolation precautions for identified infection/condition  Outcome: Progressing  Goal: Absence of fever/infection during neutropenic period  Description: INTERVENTIONS:  - Monitor WBC    Outcome: Progressing     Problem: SAFETY ADULT  Goal: Patient will remain free of falls  Description: INTERVENTIONS:  - Educate patient/family on patient safety including physical limitations  - Instruct patient to call for assistance with activity   - Consult OT/PT to assist with strengthening/mobility   - Keep Call bell within reach  - Keep bed low and locked with side rails adjusted as appropriate  - Keep care items and personal belongings within reach  - Initiate and maintain comfort rounds  - Make Fall Risk Sign visible to staff  - Apply yellow socks and bracelet  for high fall risk patients  - Consider moving patient to room near nurses station  Outcome: Progressing  Goal: Maintain or return to baseline ADL function  Description: INTERVENTIONS:  -  Assess patient's ability to carry out ADLs; assess patient's baseline for ADL function and identify physical deficits which impact ability to perform ADLs (bathing, care of mouth/teeth, toileting, grooming, dressing, etc.)  - Assess/evaluate cause of self-care deficits   - Assess range of motion  - Assess patient's mobility; develop plan if impaired  - Assess patient's need for assistive devices and provide as appropriate  - Encourage maximum independence but intervene and supervise when necessary  - Involve family in performance of ADLs  - Assess for home care needs following discharge   - Consider OT consult to assist with ADL evaluation and planning for discharge  - Provide patient education as appropriate  Outcome: Progressing  Goal: Maintains/Returns to pre admission functional level  Description: INTERVENTIONS:  - Perform AM-PAC 6 Click Basic Mobility/ Daily Activity assessment daily.  - Set and communicate daily mobility goal to care team and patient/family/caregiver.   - Collaborate with rehabilitation services on mobility goals if consulted  - Out of bed for toileting  - Record patient progress and toleration of activity level   Outcome: Progressing     Problem: Knowledge Deficit  Goal: Patient/family/caregiver demonstrates understanding of disease process, treatment plan, medications, and discharge instructions  Description: Complete learning assessment and assess knowledge base.  Interventions:  - Provide teaching at level of understanding  - Provide teaching via preferred learning methods  Outcome: Progressing  Goal: Verbalizes understanding of labor plan  Description: Assess patient/family/caregiver's baseline knowledge level and ability to understand information.  Provide education via patient/family/caregiver's  preferred learning method at appropriate level of understanding.     1. Provide teaching at level of understanding.  2. Provide teaching via preferred learning method(s).  Outcome: Progressing     Problem: DISCHARGE PLANNING  Goal: Discharge to home or other facility with appropriate resources  Description: INTERVENTIONS:  - Identify barriers to discharge w/patient and caregiver  - Arrange for needed discharge resources and transportation as appropriate  - Identify discharge learning needs (meds, wound care, etc.)  - Arrange for interpretive services to assist at discharge as needed  - Refer to Case Management Department for coordinating discharge planning if the patient needs post-hospital services based on physician/advanced practitioner order or complex needs related to functional status, cognitive ability, or social support system  Outcome: Progressing     Problem: Labor & Delivery  Goal: Manages discomfort  Description: Assess and monitor for signs and symptoms of discomfort.  Assess patient's pain level regularly and per hospital policy.  Administer medications as ordered. Support use of nonpharmacological methods to help control pain such as distraction, imagery, relaxation, and application of heat and cold.  Collaborate with interdisciplinary team and patient to determine appropriate pain management plan.    1. Include patient in decisions related to comfort.  2. Offer non-pharmacological pain management interventions.  3. Report ineffective pain management to physician.  Outcome: Progressing  Goal: Patient vital signs are stable  Description: 1. Assess vital signs - vaginal delivery.  Outcome: Progressing

## 2024-03-19 PROCEDURE — 99024 POSTOP FOLLOW-UP VISIT: CPT | Performed by: OBSTETRICS & GYNECOLOGY

## 2024-03-19 RX ORDER — KETOROLAC TROMETHAMINE 30 MG/ML
15 INJECTION, SOLUTION INTRAMUSCULAR; INTRAVENOUS ONCE
Status: COMPLETED | OUTPATIENT
Start: 2024-03-19 | End: 2024-03-19

## 2024-03-19 RX ADMIN — ACETAMINOPHEN 650 MG: 325 TABLET, FILM COATED ORAL at 19:10

## 2024-03-19 RX ADMIN — ENOXAPARIN SODIUM 40 MG: 40 INJECTION SUBCUTANEOUS at 09:26

## 2024-03-19 RX ADMIN — IBUPROFEN 600 MG: 600 TABLET ORAL at 01:45

## 2024-03-19 RX ADMIN — IBUPROFEN 600 MG: 600 TABLET ORAL at 09:27

## 2024-03-19 RX ADMIN — POLYETHYLENE GLYCOL 3350 17 G: 17 POWDER, FOR SOLUTION ORAL at 09:28

## 2024-03-19 RX ADMIN — KETOROLAC TROMETHAMINE 15 MG: 30 INJECTION, SOLUTION INTRAMUSCULAR; INTRAVENOUS at 17:58

## 2024-03-19 NOTE — LACTATION NOTE
This note was copied from a baby's chart.  CONSULT - LACTATION  Baby Girl Shields (Elia) 0 days female MRN: [id number]    [facility] AN NURSERY Room / Bed: (N)/(N) Encounter: [id number]    Maternal Information     MOTHER:  Sanchez Shields  Maternal Age: 29 y.o.   OB History: # 1 - Date: 24, Sex: Female, Weight: 3700 g (8 lb 2.5 oz), GA: 39w4d, Delivery: Vaginal, Spontaneous, Apgar1: 9, Apgar5: 9, Living: Living, Birth Comments: None   Previouse breast reduction surgery? No    Lactation history:   Has patient previously breast fed: No   How long had patient previously breast fed:     Previous breast feeding complications:       Past Surgical History:   Procedure Laterality Date    CHOLECYSTECTOMY  2017    WISDOM TOOTH EXTRACTION          Birth information:  YOB: 2023   Time of birth: 10:57 AM   Sex: female   Delivery type: Vaginal, Spontaneous   Birth Weight: 3700 g (8 lb 2.5 oz)   Percent of Weight Change: 0%     Gestational Age: 39w4d   [unfilled]    Assessment     Breast and nipple assessment: sore nipple, left side     Assessment: sleepy    Feeding assessment: feeding well  LATCH:  Latch: Grasps breast, tongue down, lips flanged, rhythmic sucking   Audible Swallowing: A few with stimulation   Type of Nipple: Everted (After stimulation)   Comfort (Breast/Nipple): Soft/non-tender   Hold (Positioning): Partial assist, teach one side, mother does other, staff holds   LATCH Score: 8        Having latch problems? No   Position(s) Used Cradle;Cross Cradle;Football   Breasts/Nipples   Left Breast Soft   Right Breast Soft   Left Nipple Everted;Tender   Right Nipple Everted   Intervention Lanolin   Breastfeeding Status Yes   Breastfeeding Progress Not yet established   Other OB Lactation Tools   Feeding Devices Lanolin   Breast Pump   Pump 3  (Spectra S2 & [place])   Patient Follow-Up   Lactation Consult Status 2   Follow-Up Type Inpatient;Call as needed    Other OB Lactation Documentation    Additional Problem Noted Assisted mom with positioning for deep latch in cross-cradle and football holds. Reviewed breast compressions, lanolin for left nipple tenderness d/t shallow latches. RSB & DC booklets reviewed.   a    Feeding recommendations:  breast feed on demand  Mother reports she would like to breast feed and begin offering bottles prior to returning to work at 12 weeks. Mother reports left nipple tenderness, reports she had a shallow latch which she believes led to discomfort. Lanolin and non-stick dressing provided.     Reviewed meeting baby's early hunger cues and feeding every 2-3 hours. Reviewed offering both breasts with each feeding and to begin feeding on side that last feeding ended on.     Mother and baby placed S2S for feeding. Multiple attempts to latch on left breast were made but baby was sleepy and no latch obtained. Baby brought back S2S with mother. Demonstrated with adequate teach- expression, yielding drips of milk.     Baby and mother assisted into football hold on right breast. Baby aligned nipple to nose and latch obtained. Baby held breast in mouth but did not perform any sucks. Demonstrated how to un-latch baby from the breast with pinky finger-technique.     Mother and baby brought into cross-cradle/cradle hold on right breast. Baby obtained deep latch with minimal LC assistance. Reviewed breast compressions if baby becomes sleepy at the breast.       RSB & DC booklets reviewed.   Mother has Spectra S2 breast pump.     Encouraged to call for additional support or questions, ext. Provided.     Information on hand expression given. Discussed benefits of knowing how to manually express breast including stimulating milk supply, softening nipple for latch and evacuating breast in the event of engorgement.    Mom is encouraged to place baby skin to skin for feedings. Skin to skin education provided for baby placement on mother's chest,  baby only in diaper, blankets below shoulders on baby's back. Skin to skin is encouraged to continue at home for feedings and between feedings.      - Start feedings on breast that last feeding ended   - allow no more than 3 hours between breast feeding sessions   - time between feedings is counted from the beginning of the first feed to the beginning of the next feeding session    Reviewed early signs of hunger, including tensing of hands and shoulders - no need to wait for open eyes.  Crying is a late hunger sign.  If baby is crying, soothe baby first and then attempt to latch.  Reviewed normal sucking patterns: transition from stimulation to nutritive to release or non-nutritive. The goal is to see and hear lots of swallowing.    Reviewed normal nursing pattern: infant could latch on one breast up to 30 minutes or until releases on own. Signs of satiation is open hand with fingers that do not grab your finger.  Discussed difference in sensation of non-nutritive v nutritive sucking    Met with mother. Provided mother with Ready, Set, Baby booklet.    Discussed Skin to Skin contact an benefits to mom and baby.  Talked about the delay of the first bath until baby has adjusted. Spoke about the benefits of rooming in. Feeding on cue and what that means for recognizing infant's hunger. Avoidance of pacifiers for the first month discussed. Talked about exclusive breastfeeding for the first 6 months.    Positioning and latch reviewed as well as showing images of other feeding positions.  Discussed the properties of a good latch in any position. Reviewed hand/manual expression.  Discussed s/s that baby is getting enough milk and some s/s that breastfeeding dyad may need further help.    Gave information on common concerns, what to expect the first few weeks after delivery, preparing for other caregivers, and how partners can help. Resources for support also provided.    Encouraged parents to call for assistance,  questions, and concerns about breastfeeding.  Extension provided.    Fatuma Tello RN 3/18/2024 8:54 PM

## 2024-03-19 NOTE — LACTATION NOTE
This note was copied from a baby's chart.  Worked on positioning infant up at chest level and starting to feed infant with nose arriving at the nipple. Then, using areolar compression to achieve a deep latch that is comfortable and exchanges optimum amounts of milk. I offered suggestions on positioning, for a more optimal latch, showed mom proper positioning, ear, shoulder hip in line, baby's arms open, not in between mom and baby, nose to nipple, hand at base of head/neck.  Breast compressions offered as needed throughout the feeding.  Encouraged hand expression after each feeding and feeding expressed colostrum to baby.    Sanchez has been applying moist wound healing to the left breast. She reports that latch has been more painful on the left breast.    Sanchez reports a more comfortable feeding on both breasts with a deeper latch at this time.    Encouraged family to call for assistance as needed.       0904 Brit Mclain RN aware that baby appeared jittery at times during consult.

## 2024-03-19 NOTE — PLAN OF CARE
Problem: PAIN - ADULT  Goal: Verbalizes/displays adequate comfort level or baseline comfort level  Description: Interventions:  - Encourage patient to monitor pain and request assistance  - Assess pain using appropriate pain scale  - Administer analgesics based on type and severity of pain and evaluate response  - Implement non-pharmacological measures as appropriate and evaluate response  - Consider cultural and social influences on pain and pain management  - Notify physician/advanced practitioner if interventions unsuccessful or patient reports new pain  Outcome: Progressing     Problem: INFECTION - ADULT  Goal: Absence or prevention of progression during hospitalization  Description: INTERVENTIONS:  - Assess and monitor for signs and symptoms of infection  - Monitor lab/diagnostic results  - Monitor all insertion sites, i.e. indwelling lines, tubes, and drains  - Monitor endotracheal if appropriate and nasal secretions for changes in amount and color  - [place] appropriate cooling/warming therapies per order  - Administer medications as ordered  - Instruct and encourage patient and family to use good hand hygiene technique  - Identify and instruct in appropriate isolation precautions for identified infection/condition  Outcome: Progressing  Goal: Absence of fever/infection during neutropenic period  Description: INTERVENTIONS:  - Monitor WBC    Outcome: Progressing     Problem: SAFETY ADULT  Goal: Patient will remain free of falls  Description: INTERVENTIONS:  - Educate patient/family on patient safety including physical limitations  - Instruct patient to call for assistance with activity   - Consult OT/PT to assist with strengthening/mobility   - Keep Call bell within reach  - Keep bed low and locked with side rails adjusted as appropriate  - Keep care items and personal belongings within reach  - Initiate and maintain comfort rounds  - Make Fall Risk Sign visible to staff  - Offer Toileting every  Hours,  in advance of need  - Initiate/Maintain alarm  - Obtain necessary fall risk management equipment:   - Apply yellow socks and bracelet for high fall risk patients  - Consider moving patient to room near nurses station  Outcome: Progressing  Goal: Maintain or return to baseline ADL function  Description: INTERVENTIONS:  -  Assess patient's ability to carry out ADLs; assess patient's baseline for ADL function and identify physical deficits which impact ability to perform ADLs (bathing, care of mouth/teeth, toileting, grooming, dressing, etc.)  - Assess/evaluate cause of self-care deficits   - Assess range of motion  - Assess patient's mobility; develop plan if impaired  - Assess patient's need for assistive devices and provide as appropriate  - Encourage maximum independence but intervene and supervise when necessary  - Involve family in performance of ADLs  - Assess for home care needs following discharge   - Consider OT consult to assist with ADL evaluation and planning for discharge  - Provide patient education as appropriate  Outcome: Progressing  Goal: Maintains/Returns to pre admission functional level  Description: INTERVENTIONS:  - Perform AM-PAC 6 Click Basic Mobility/ Daily Activity assessment daily.  - Set and communicate daily mobility goal to care team and patient/family/caregiver.   - Collaborate with rehabilitation services on mobility goals if consulted  - Perform Range of Motion  times a day.  - Reposition patient every  hours.  - Dangle patient  times a day  - Stand patient  times a day  - Ambulate patient  times a day  - Out of bed to chair  times a day   - Out of bed for meals  times a day  - Out of bed for toileting  - Record patient progress and toleration of activity level   Outcome: Progressing     Problem: DISCHARGE PLANNING  Goal: Discharge to home or other facility with appropriate resources  Description: INTERVENTIONS:  - Identify barriers to discharge w/patient and caregiver  - Arrange for  needed discharge resources and transportation as appropriate  - Identify discharge learning needs (meds, wound care, etc.)  - Arrange for interpretive services to assist at discharge as needed  - Refer to Case Management Department for coordinating discharge planning if the patient needs post-hospital services based on physician/advanced practitioner order or complex needs related to functional status, cognitive ability, or social support system  Outcome: Progressing     Problem: POSTPARTUM  Goal: Experiences normal postpartum course  Description: INTERVENTIONS:  - Monitor maternal vital signs  - Assess uterine involution and lochia  Outcome: Progressing  Goal: Appropriate maternal -  bonding  Description: INTERVENTIONS:  - Identify family support  - Assess for appropriate maternal/infant bonding   -Encourage maternal/infant bonding opportunities  - Referral to  or  as needed  Outcome: Progressing  Goal: Establishment of infant feeding pattern  Description: INTERVENTIONS:  - Assess breast/bottle feeding  - Refer to lactation as needed  Outcome: Progressing  Goal: Incision(s), wounds(s) or drain site(s) healing without S/S of infection  Description: INTERVENTIONS  - Assess and document dressing, incision, wound bed, drain sites and surrounding tissue  - Provide patient and family education  - Perform skin care/dressing changes every   Outcome: Progressing

## 2024-03-19 NOTE — PROGRESS NOTES
Progress Note - OB/GYN   Sanchez Shields 29 y.o. female MRN: [id number]  Unit/Bed#: -01 Encounter: [id number]      Assessment/Plan    Sanchez Shields is a 29 y.o.  who is PPD 1  s/p  at 39w4d     *  (spontaneous vaginal delivery)  Assessment & Plan  - Pain well controlled with oral analgesics  - Voiding spontaneously  - Tolerating PO fluids and solids  - Ambulating without difficulty  - Encourage breastfeeding and familial bonding    Heterozygous for MTHFR gene mutation  Assessment & Plan  On lovenox throughout pregnancy  Stopped lovenox  >24 hours prior to induction. Patients last Lovenox dose prior to induction was on 3/15 9PM.   Prophylactic lovenox starting 3/19 AM, continue for 6 weeks postpartum    Anxiety during pregnancy  Assessment & Plan  F/u EPDS    Central retinal vein occlusion  Assessment & Plan  Occurred in  while on hormonal birth control   On Lovenox throughout pregnancy       Disposition: Anticipate discharge home postpartum Day #2. Patient would like to be monitored for another day for bleeding once Lovenox is restarted this morning.  Barriers to discharge: none.    Subjective/Objective     Subjective: Postpartum state    Pain: no  Tolerating PO: yes  Voiding: yes  Flatus: yes  BM: no  Ambulating: yes  Breastfeeding: Breastfeeding  Chest pain: no  Shortness of breath: no  Leg pain: no  Lochia: appropriate    Objective:     Vitals:  Vitals:    24 1601 24 0018 24 0300   BP: 108/60 113/61 107/71 100/70   BP Location: Left arm Left arm Left arm Left arm   Pulse: 95 74 74 74   Resp: 16 16 17 16   Temp: 98.2 °F (36.8 °C) 97.7 °F (36.5 °C) 97.8 °F (36.6 °C) 97.6 °F (36.4 °C)   TempSrc: Oral Oral Oral Oral   SpO2: 97%  98% 98%   Weight:       Height:           Physical Exam:   GEN: appears well, alert and oriented x 3, pleasant and cooperative   CV: Regular rate  RESP: non labored breathing  ABDOMEN: soft, no tenderness, no distention, Uterine fundus firm and  non-tender, -1 cm below the umbilicus  EXTREMITIES: non-tender  NEURO Alert and oriented to person, place, and time.       Lab Results   Component Value Date    WBC 13.23 (H) 03/17/2024    HGB 14.6 03/17/2024    HCT 41.9 03/17/2024    MCV 89 03/17/2024     03/17/2024         Dione Kline MD  Obstetrics & Gynecology  03/19/24

## 2024-03-20 ENCOUNTER — NURSE TRIAGE (OUTPATIENT)
Age: 29
End: 2024-03-20

## 2024-03-20 VITALS
RESPIRATION RATE: 18 BRPM | BODY MASS INDEX: 31.99 KG/M2 | OXYGEN SATURATION: 97 % | HEIGHT: 65 IN | HEART RATE: 89 BPM | SYSTOLIC BLOOD PRESSURE: 98 MMHG | TEMPERATURE: 98.1 F | WEIGHT: 192 LBS | DIASTOLIC BLOOD PRESSURE: 55 MMHG

## 2024-03-20 PROCEDURE — NC001 PR NO CHARGE: Performed by: OBSTETRICS & GYNECOLOGY

## 2024-03-20 PROCEDURE — 99024 POSTOP FOLLOW-UP VISIT: CPT | Performed by: OBSTETRICS & GYNECOLOGY

## 2024-03-20 RX ORDER — IBUPROFEN 600 MG/1
600 TABLET ORAL EVERY 6 HOURS
Qty: 30 TABLET | Refills: 0 | Status: SHIPPED | OUTPATIENT
Start: 2024-03-20

## 2024-03-20 RX ORDER — DOCUSATE SODIUM 100 MG/1
100 CAPSULE, LIQUID FILLED ORAL 2 TIMES DAILY PRN
Qty: 30 CAPSULE | Refills: 0 | Status: SHIPPED | OUTPATIENT
Start: 2024-03-20 | End: 2024-04-19

## 2024-03-20 RX ORDER — ACETAMINOPHEN 325 MG/1
650 TABLET ORAL EVERY 4 HOURS PRN
Start: 2024-03-20

## 2024-03-20 RX ORDER — ENOXAPARIN SODIUM 100 MG/ML
40 INJECTION SUBCUTANEOUS
Qty: 16.4 ML | Refills: 0 | Status: SHIPPED | OUTPATIENT
Start: 2024-03-20 | End: 2024-04-30

## 2024-03-20 RX ORDER — POLYETHYLENE GLYCOL 3350 17 G/17G
17 POWDER, FOR SOLUTION ORAL DAILY
Start: 2024-03-20

## 2024-03-20 RX ORDER — BENZOCAINE/MENTHOL 6 MG-10 MG
1 LOZENGE MUCOUS MEMBRANE DAILY PRN
Start: 2024-03-20

## 2024-03-20 RX ADMIN — IBUPROFEN 600 MG: 600 TABLET ORAL at 00:45

## 2024-03-20 RX ADMIN — IBUPROFEN 600 MG: 600 TABLET ORAL at 05:58

## 2024-03-20 RX ADMIN — ENOXAPARIN SODIUM 40 MG: 40 INJECTION SUBCUTANEOUS at 08:26

## 2024-03-20 RX ADMIN — POLYETHYLENE GLYCOL 3350 17 G: 17 POWDER, FOR SOLUTION ORAL at 08:26

## 2024-03-20 NOTE — UTILIZATION REVIEW
"Mother and baby discharged on 2024       NOTIFICATION OF INPATIENT ADMISSION   MATERNITY/DELIVERY AUTHORIZATION REQUEST   SERVICING FACILITY:   [facility] - L&D, [place], NICU  [address] [place] [address]  Tax ID: [id number]  NPI: [id number]   ATTENDING PROVIDER:  Attending Name and NPI#: Lucina Kennedy Md [[id number]]  Address: [address] [place] [address]  Phone: [phone]   ADMISSION INFORMATION:  Place of Service: Inpatient [facility]  Place of Service Code: 21  Inpatient Admission Date/Time: 3/17/24  7:50 PM  Discharge Date/Time: 3/20/2024 11:55 AM  Admitting Diagnosis Code/Description:  Encounter for induction of labor [Z34.90]  Encounter for full-term uncomplicated delivery [O80]     Mother: Sanchez Shields 1995 Estimated Date of Delivery: 3/21/24  Delivering clinician: Adri Naranjo    OB History          1    Para   1    Term   1       0    AB   0    Living   1         SAB   0    IAB   0    Ectopic   0    Multiple   0    Live Births   1                Name & MRN:   Information for the patient's :  [facility] Baby Girl (Sanchez) [[id number]]    Delivery Information:  Sex: female  Delivered 3/18/2024 10:57 AM by Vaginal, Spontaneous; Gestational Age: 39w4d    [place] Measurements:  Weight: 8 lb 2.5 oz (3700 g);  Height: 20\"    APGAR 1 minute 5 minutes 10 minutes   Totals: 9 9       UTILIZATION REVIEW CONTACT:  Dinorah Deleon Utilization   Network Utilization Review Department  Phone: [phone]  Fax [phone]  Email: [email or web address]  Contact for approvals/pending authorizations, clinical reviews, and discharge.     PHYSICIAN ADVISORY SERVICES:  Medical Necessity Denial & Hjto-fa-Pjtl Review  Phone: [phone]  Fax: [phone]  Email: [email or web address]     DISCHARGE SUPPORT TEAM:  For Patients Discharge Needs & Updates  Phone: " [phone] opt. 2 Fax: [phone]  Email: [email or web address]

## 2024-03-20 NOTE — TELEPHONE ENCOUNTER
RN called pt back. Discussed that Biofreeze is safe to use while breastfeeding to treat lower back pain. Advised to also use heat pad. Pt is using Tylenol and Motrin as per hospital to help with lower back pain and cramping while breastfeeding. No further questions at this time. Pt scheduled for PP appointment. Will call back with additional questions as needed.

## 2024-03-20 NOTE — PROGRESS NOTES
Progress Note - OB/GYN   Sanchez Shields 29 y.o. female MRN: [id number]  Unit/Bed#: -01 Encounter: [id number]      Assessment/Plan    Sanchez Shields is a 29 y.o.  who is PPD 2 s/p  at 39w4d     *  (spontaneous vaginal delivery)  Assessment & Plan  - Pain well controlled with oral analgesics  - Voiding spontaneously  - Tolerating PO fluids and solids  - Ambulating without difficulty  - Encourage breastfeeding and familial bonding    Heterozygous for MTHFR gene mutation  Assessment & Plan  On lovenox throughout pregnancy  Stopped lovenox  >24 hours prior to induction. Patients last Lovenox dose prior to induction was on 3/15 9PM.   Prophylactic lovenox starting 3/19 AM, continue for 6 weeks postpartum    Anxiety during pregnancy  Assessment & Plan  F/u EPDS    Central retinal vein occlusion  Assessment & Plan  Occurred in  while on hormonal birth control   On Lovenox throughout pregnancy       Disposition: Anticipate discharge home postpartum Day #2  Barriers to discharge: none     Subjective/Objective     Subjective: Postpartum state    Pain: no  Tolerating PO: yes  Voiding: yes  Flatus: yes  BM: yes  Ambulating: yes  Breastfeeding: Breastfeeding  Chest pain: no  Shortness of breath: no  Leg pain: no  Lochia: minimal    Objective:     Vitals:  Vitals:    24 0828 24 1619 24 2037 24 0015   BP: 102/55 112/60 112/64 107/56   BP Location: Left arm Left arm Left arm Left arm   Pulse: 71 71 75 73   Resp: 18 18 18 17   Temp: 97.5 °F (36.4 °C) 97.7 °F (36.5 °C) 98 °F (36.7 °C) 98 °F (36.7 °C)   TempSrc: Oral Oral Oral Oral   SpO2:   98% 99%   Weight:       Height:           Physical Exam:   GEN: appears well, alert and oriented x 3, pleasant and cooperative   CV: Regular rate  RESP: non labored breathing  ABDOMEN: soft, no tenderness, no distention, Uterine fundus firm and non-tender, -2 cm below the umbilicus  EXTREMITIES: non-tender  NEURO Alert and oriented to person, place, and time.        Lab Results   Component Value Date    WBC 13.23 (H) 03/17/2024    HGB 14.6 03/17/2024    HCT 41.9 03/17/2024    MCV 89 03/17/2024     03/17/2024         Dione Kline MD  Obstetrics & Gynecology  03/20/24

## 2024-03-20 NOTE — LACTATION NOTE
This note was copied from a baby's chart.  CONSULT - LACTATION  Baby Girl Shields (Elia) 2 days female MRN: [id number]    [facility] NURSERY Room / Bed: (N)/(N) Encounter: [id number]    Maternal Information     MOTHER:  Sanchez Shields  Maternal Age: 29 y.o.   OB History: # 1 - Date: 24, Sex: Female, Weight: 3700 g (8 lb 2.5 oz), GA: 39w4d, Delivery: Vaginal, Spontaneous, Apgar1: 9, Apgar5: 9, Living: Living, Birth Comments: None   Previouse breast reduction surgery? No    Lactation history:   Has patient previously breast fed: No   How long had patient previously breast fed:     Previous breast feeding complications:       Past Surgical History:   Procedure Laterality Date    CHOLECYSTECTOMY  2017    WISDOM TOOTH EXTRACTION          Birth information:  YOB: 2023   Time of birth: 10:57 AM   Sex: female   Delivery type: Vaginal, Spontaneous   Birth Weight: 3700 g (8 lb 2.5 oz)   Percent of Weight Change: -6%     Gestational Age: 39w4d   [unfilled]    Assessment     Breast and nipple assessment: normal assessment    [place] Assessment: normal assessment    Feeding assessment: feeding well  LATCH:  Latch: Grasps breast, tongue down, lips flanged, rhythmic sucking   Audible Swallowing: Spontaneous and intermittent (24 hours old)   Type of Nipple: Everted (After stimulation)   Comfort (Breast/Nipple): Soft/non-tender   Hold (Positioning): No assist from staff, mother able to position/hold infant   LATCH Score: 10             24 0900   Lactation Consultation   Reason for Consult 20 minutes;10 min   LATCH Documentation   Latch 2   Audible Swallowing 2   Type of Nipple 2   Comfort (Breast/Nipple) 2   Hold (Positioning) 2   LATCH Score 10   Having latch problems? No   Position(s) Used Cross Cradle   Breasts/Nipples   Left Breast Soft   Right Breast Soft   Left Nipple Everted   Right Nipple Everted   Intervention Lanolin;Hand expression   Breastfeeding  Status Yes   Breastfeeding Progress Not yet established;Breastfeeding well   Other OB Lactation Tools   Feeding Devices Lanolin   Breast Pump   Pump 3   Patient Follow-Up   Lactation Consult Status 2   Follow-Up Type Inpatient;Call as needed   Other OB Lactation Documentation    Additional Problem Noted Baby latched upon arrival into room. Baby latched deeply and sucking with swallows. Reviewed cluster feeding and pumping per mom's questions.  (D/C booklet reviewed.)     Feeding recommendations:  breast feed on demand    Baby latched upon arrival into room. Baby latched deeply and sucking with swallows. Reviewed cluster feeding and pumping per mom's questions.    Provided demonstration, education and support of deep latch to breast by placing the nipple to the nose, dragging down to chin to achieve a wide latch. Bring baby to the breast, not breast to baby. Move your shoulders down and away from your ears. Look for ear, shoulder, hip alignment. Baby's upper and lower lip should be flanged on the breast.    Met with mother to go over discharge breastfeeding booklet including the feeding log. Emphasized 8 or more (12) feedings in a 24 hour period, what to expect for the number of diapers per day of life and the progression of properties of the  stooling pattern.    List of reasons to call a lactation consultant.  Feeding logs  Feeding cues  Hand expression  Baby's Second day (cluster feeding)  Breastfeeding and Your Lifestyle (Medications, Alcohol, Caffeine, Smoking, Street Drugs, Methadone)  First Two Weeks Survival Guide for Breastfeeding  Breast Changes  Physical Therapy  Storage and Handling of Breast milk  How to Keep Your Breast Pump Kit Clean  The Employed Breastfeeding Mother  Mixed feeding  Bottle feeding like breastfeeding (paced bottle feeding)  astfeeding and your lifestyle, storage and preparation of breast milk, how to keep you breast pump clean, the employed breastfeeding mother and paced bottle  feeding handouts.     Booklet included Breastfeeding Resources for after discharge including access to the number for the [facility].    Vonnie Antonio 3/20/2024 9:06 AM

## 2024-03-20 NOTE — TELEPHONE ENCOUNTER
"  Reason for Disposition  • Information only question and nurse able to answer    Answer Assessment - Initial Assessment Questions  1. REASON FOR CALL or QUESTION: \"What is your reason for calling today?\" or \"How can I best help you?\" or \"What question do you have that I can help answer?\"      Medication question    Protocols used: Information Only Call - No Triage-ADULT-[place]    "

## 2024-03-20 NOTE — PLAN OF CARE
Problem: PAIN - ADULT  Goal: Verbalizes/displays adequate comfort level or baseline comfort level  Description: Interventions:  - Encourage patient to monitor pain and request assistance  - Assess pain using appropriate pain scale  - Administer analgesics based on type and severity of pain and evaluate response  - Implement non-pharmacological measures as appropriate and evaluate response  - Consider cultural and social influences on pain and pain management  - Notify physician/advanced practitioner if interventions unsuccessful or patient reports new pain  Outcome: Progressing     Problem: INFECTION - ADULT  Goal: Absence or prevention of progression during hospitalization  Description: INTERVENTIONS:  - Assess and monitor for signs and symptoms of infection  - Monitor lab/diagnostic results  - Monitor all insertion sites, i.e. indwelling lines, tubes, and drains  - Monitor endotracheal if appropriate and nasal secretions for changes in amount and color  - [place] appropriate cooling/warming therapies per order  - Administer medications as ordered  - Instruct and encourage patient and family to use good hand hygiene technique  - Identify and instruct in appropriate isolation precautions for identified infection/condition  Outcome: Progressing  Goal: Absence of fever/infection during neutropenic period  Description: INTERVENTIONS:  - Monitor WBC    Outcome: Progressing     Problem: SAFETY ADULT  Goal: Patient will remain free of falls  Description: INTERVENTIONS:  - Educate patient/family on patient safety including physical limitations  - Instruct patient to call for assistance with activity   - Consult OT/PT to assist with strengthening/mobility   - Keep Call bell within reach  - Keep bed low and locked with side rails adjusted as appropriate  - Keep care items and personal belongings within reach  - Initiate and maintain comfort rounds  - Apply yellow socks and bracelet for high fall risk patients  - Consider  moving patient to room near nurses station  Outcome: Progressing  Goal: Maintain or return to baseline ADL function  Description: INTERVENTIONS:  -  Assess patient's ability to carry out ADLs; assess patient's baseline for ADL function and identify physical deficits which impact ability to perform ADLs (bathing, care of mouth/teeth, toileting, grooming, dressing, etc.)  - Assess/evaluate cause of self-care deficits   - Assess range of motion  - Assess patient's mobility; develop plan if impaired  - Assess patient's need for assistive devices and provide as appropriate  - Encourage maximum independence but intervene and supervise when necessary  - Involve family in performance of ADLs  - Assess for home care needs following discharge   - Consider OT consult to assist with ADL evaluation and planning for discharge  - Provide patient education as appropriate  Outcome: Progressing  Goal: Maintains/Returns to pre admission functional level  Description: INTERVENTIONS:  - Perform AM-PAC 6 Click Basic Mobility/ Daily Activity assessment daily.  - Set and communicate daily mobility goal to care team and patient/family/caregiver.   - Collaborate with rehabilitation services on mobility goals if consulted  - Out of bed for toileting  - Record patient progress and toleration of activity level   Outcome: Progressing     Problem: DISCHARGE PLANNING  Goal: Discharge to home or other facility with appropriate resources  Description: INTERVENTIONS:  - Identify barriers to discharge w/patient and caregiver  - Arrange for needed discharge resources and transportation as appropriate  - Identify discharge learning needs (meds, wound care, etc.)  - Arrange for interpretive services to assist at discharge as needed  - Refer to Case Management Department for coordinating discharge planning if the patient needs post-hospital services based on physician/advanced practitioner order or complex needs related to functional status, cognitive  ability, or social support system  Outcome: Progressing     Problem: POSTPARTUM  Goal: Experiences normal postpartum course  Description: INTERVENTIONS:  - Monitor maternal vital signs  - Assess uterine involution and lochia  Outcome: Progressing  Goal: Appropriate maternal -  bonding  Description: INTERVENTIONS:  - Identify family support  - Assess for appropriate maternal/infant bonding   -Encourage maternal/infant bonding opportunities  - Referral to  or  as needed  Outcome: Progressing  Goal: Establishment of infant feeding pattern  Description: INTERVENTIONS:  - Assess breast/bottle feeding  - Refer to lactation as needed  Outcome: Progressing  Goal: Incision(s), wounds(s) or drain site(s) healing without S/S of infection  Description: INTERVENTIONS  - Assess and document dressing, incision, wound bed, drain sites and surrounding tissue  - Provide patient and family education  Outcome: Progressing

## 2024-03-20 NOTE — TELEPHONE ENCOUNTER
----- Message from Yajaira Robles sent at 3/20/2024 12:32 PM EDT -----  Patient delivered on 3/18 she had a vaginal birth and epidural. She states that when she breast feeds she is still getting contractions and lower back pain. Can patient use a topical cream or Biofreeze on her back?

## 2024-03-20 NOTE — PLAN OF CARE
Problem: PAIN - ADULT  Goal: Verbalizes/displays adequate comfort level or baseline comfort level  Description: Interventions:  - Encourage patient to monitor pain and request assistance  - Assess pain using appropriate pain scale  - Administer analgesics based on type and severity of pain and evaluate response  - Implement non-pharmacological measures as appropriate and evaluate response  - Consider cultural and social influences on pain and pain management  - Notify physician/advanced practitioner if interventions unsuccessful or patient reports new pain  Outcome: Progressing     Problem: INFECTION - ADULT  Goal: Absence or prevention of progression during hospitalization  Description: INTERVENTIONS:  - Assess and monitor for signs and symptoms of infection  - Monitor lab/diagnostic results  - Monitor all insertion sites, i.e. indwelling lines, tubes, and drains  - Monitor endotracheal if appropriate and nasal secretions for changes in amount and color  - [place] appropriate cooling/warming therapies per order  - Administer medications as ordered  - Instruct and encourage patient and family to use good hand hygiene technique  - Identify and instruct in appropriate isolation precautions for identified infection/condition  Outcome: Progressing  Goal: Absence of fever/infection during neutropenic period  Description: INTERVENTIONS:  - Monitor WBC    Outcome: Progressing     Problem: SAFETY ADULT  Goal: Patient will remain free of falls  Description: INTERVENTIONS:  - Educate patient/family on patient safety including physical limitations  - Instruct patient to call for assistance with activity   - Consult OT/PT to assist with strengthening/mobility   - Keep Call bell within reach  - Keep bed low and locked with side rails adjusted as appropriate  - Keep care items and personal belongings within reach  - Initiate and maintain comfort rounds  - Make Fall Risk Sign visible to staff  - Offer Toileting every  Hours,  in advance of need  - Initiate/Maintain alarm  - Obtain necessary fall risk management equipment:   - Apply yellow socks and bracelet for high fall risk patients  - Consider moving patient to room near nurses station  Outcome: Progressing  Goal: Maintain or return to baseline ADL function  Description: INTERVENTIONS:  -  Assess patient's ability to carry out ADLs; assess patient's baseline for ADL function and identify physical deficits which impact ability to perform ADLs (bathing, care of mouth/teeth, toileting, grooming, dressing, etc.)  - Assess/evaluate cause of self-care deficits   - Assess range of motion  - Assess patient's mobility; develop plan if impaired  - Assess patient's need for assistive devices and provide as appropriate  - Encourage maximum independence but intervene and supervise when necessary  - Involve family in performance of ADLs  - Assess for home care needs following discharge   - Consider OT consult to assist with ADL evaluation and planning for discharge  - Provide patient education as appropriate  Outcome: Progressing  Goal: Maintains/Returns to pre admission functional level  Description: INTERVENTIONS:  - Perform AM-PAC 6 Click Basic Mobility/ Daily Activity assessment daily.  - Set and communicate daily mobility goal to care team and patient/family/caregiver.   - Collaborate with rehabilitation services on mobility goals if consulted  - Perform Range of Motion  times a day.  - Reposition patient every  hours.  - Dangle patient  times a day  - Stand patient  times a day  - Ambulate patient  times a day  - Out of bed to chair  times a day   - Out of bed for meals  times a day  - Out of bed for toileting  - Record patient progress and toleration of activity level   Outcome: Progressing     Problem: DISCHARGE PLANNING  Goal: Discharge to home or other facility with appropriate resources  Description: INTERVENTIONS:  - Identify barriers to discharge w/patient and caregiver  - Arrange for  needed discharge resources and transportation as appropriate  - Identify discharge learning needs (meds, wound care, etc.)  - Arrange for interpretive services to assist at discharge as needed  - Refer to Case Management Department for coordinating discharge planning if the patient needs post-hospital services based on physician/advanced practitioner order or complex needs related to functional status, cognitive ability, or social support system  Outcome: Progressing     Problem: POSTPARTUM  Goal: Experiences normal postpartum course  Description: INTERVENTIONS:  - Monitor maternal vital signs  - Assess uterine involution and lochia  Outcome: Progressing  Goal: Appropriate maternal -  bonding  Description: INTERVENTIONS:  - Identify family support  - Assess for appropriate maternal/infant bonding   -Encourage maternal/infant bonding opportunities  - Referral to  or  as needed  Outcome: Progressing  Goal: Establishment of infant feeding pattern  Description: INTERVENTIONS:  - Assess breast/bottle feeding  - Refer to lactation as needed  Outcome: Progressing  Goal: Incision(s), wounds(s) or drain site(s) healing without S/S of infection  Description: INTERVENTIONS  - Assess and document dressing, incision, wound bed, drain sites and surrounding tissue  - Provide patient and family education  - Perform skin care/dressing changes every   Outcome: Progressing

## 2024-03-21 ENCOUNTER — NURSE TRIAGE (OUTPATIENT)
Dept: OTHER | Facility: OTHER | Age: 29
End: 2024-03-21

## 2024-03-21 DIAGNOSIS — O92.29 POSTPARTUM NIPPLE PAIN: Primary | ICD-10-CM

## 2024-03-21 NOTE — TELEPHONE ENCOUNTER
"Reason for Disposition   [1] SEVERE pain AND [2] not improved 2 hours after pain medicine and care advice    Additional Information   Breastfeeding questions about mother (breast symptoms or feeling sick)    Answer Assessment - Initial Assessment Questions  1. SYMPTOM: \"What's the main symptom you're concerned about?\"  (e.g., lump, pain, rash, nipple discharge)      Sore and cracked nipples, pain    2. LOCATION: \"Where is the this located?\"      Bilat breasts    3. ONSET: \"When did this start?\"      2 days ago    4. PRIOR HISTORY: \"Do you have any history of prior problems with your breasts?\" (e.g., lumps, cancer, fibrocystic breast disease)      Denies     5. CAUSE: \"What do you think is causing this symptom?\"      Unsure     6. OTHER SYMPTOMS: \"Do you have any other symptoms?\" (e.g., fever, breast pain, redness or rash, nipple discharge)      Warm and full, nipples appear red (reports her nipples are usually brown) and are scabbed    7. PREGNANCY-BREASTFEEDING: \"Is there any chance you are pregnant?\" \"When was your last menstrual period?\" \"Are you breastfeeding?\"      Breastfeeding, gave birth on 3/18/24    Protocols used: Breast Symptoms-ADULT-AH, Breastfeeding - Mother's Breast Symptoms or Illness-PEDIATRIC-AH    "

## 2024-03-21 NOTE — PROGRESS NOTES
Antibiotic: Mupirocin 2% ointment 15gm  Steroid:Betamethasone 0.1% ointment 15gm  Antifungal: Miconazole powder to a concentration 2%  Total: 30gms  Apply sparingly after each feeding.

## 2024-03-21 NOTE — TELEPHONE ENCOUNTER
"Reason for Disposition   [1] SEVERE pain AND [2] not improved 2 hours after pain medicine and care advice    Additional Information   Breastfeeding questions about mother (breast symptoms or feeling sick)    Answer Assessment - Initial Assessment Questions  1. SYMPTOM: \"What's the main symptom you're concerned about?\"  (e.g., lump, pain, rash, nipple discharge)      Severely painful and cracked nipples    2. LOCATION: \"Where is the this located?\"      Bilat nipples    3. ONSET: \"When did this start?\"      2 days ago, worsening    4. PRIOR HISTORY: \"Do you have any history of prior problems with your breasts?\" (e.g., lumps, cancer, fibrocystic breast disease)      Denies     5. CAUSE: \"What do you think is causing this symptom?\"      Unsure     6. OTHER SYMPTOMS: \"Do you have any other symptoms?\" (e.g., fever, breast pain, redness or rash, nipple discharge)      Breast feel warm and full, nipples appear red (reports her nipples are usually brown) and are scabbed    7. PREGNANCY-BREASTFEEDING: \"Is there any chance you are pregnant?\" \"When was your last menstrual period?\" \"Are you breastfeeding?\"      Breastfeeding, gave birth on 3/18/24    Protocols used: Breast Symptoms-ADULT-AH, Breastfeeding - Mother's Breast Symptoms or Illness-PEDIATRIC-AH    "

## 2024-03-22 ENCOUNTER — TELEPHONE (OUTPATIENT)
Age: 29
End: 2024-03-22

## 2024-03-22 NOTE — TELEPHONE ENCOUNTER
Incoming call from patient-    Delivered Mon - d/c on Wed. From L&D    Baby was not voiding for few hrs ( maybe 12hrs)  They went to  Ped in [facility]  on Thurs 4am on advice from oncall, as they were not established with pediatrician yet.     They were told it was due to latching issue. They are discharging again today from Peds in [facility]. She met with lactation specialist who helped greatly with pumping schedule etc    Denies symptoms of mastitis. Nipples feel little sore- but now she has more output and good pumping schedule.    Has appointment with lactation today and she will see if they feel she still needs script. Pharmacy told her she can put the script on hold if doesn't need it right now- she will let them know after her appointment today but she feels 100% better.

## 2024-03-25 LAB — PLACENTA IN STORAGE: NORMAL

## 2024-03-26 ENCOUNTER — OFFICE VISIT (OUTPATIENT)
Dept: POSTPARTUM | Facility: CLINIC | Age: 29
End: 2024-03-26
Payer: COMMERCIAL

## 2024-03-26 ENCOUNTER — TELEMEDICINE (OUTPATIENT)
Dept: BEHAVIORAL/MENTAL HEALTH CLINIC | Facility: CLINIC | Age: 29
End: 2024-03-26
Payer: COMMERCIAL

## 2024-03-26 DIAGNOSIS — O92.79 INSUFFICIENT LACTATION: ICD-10-CM

## 2024-03-26 DIAGNOSIS — F41.9 ANXIETY: Primary | ICD-10-CM

## 2024-03-26 PROCEDURE — 90834 PSYTX W PT 45 MINUTES: CPT | Performed by: SOCIAL WORKER

## 2024-03-26 PROCEDURE — 99404 PREV MED CNSL INDIV APPRX 60: CPT | Performed by: PEDIATRICS

## 2024-03-26 NOTE — PSYCH
Virtual Regular Visit    Verification of patient location:    Patient is located at Home in the following state in which I hold an active license [place]      Assessment/Plan:    Problem List Items Addressed This Visit    None  Visit Diagnoses       Anxiety    -  Primary            Goals addressed in session: Goal 1          Reason for visit is No chief complaint on file.       Encounter provider Patria Serna LCSW    Provider located at [facility] [address] [place] [address] [phone]      Recent Visits  No visits were found meeting these conditions.  Showing recent visits within past 7 days and meeting all other requirements  Today's Visits  Date Type Provider Dept   03/26/24 Telemedicine Patria Serna LCSW [facility] [place]   Showing today's visits and meeting all other requirements  Future Appointments  No visits were found meeting these conditions.  Showing future appointments within next 150 days and meeting all other requirements       The patient was identified by name and date of birth. Sanchez Shields was informed that this is a telemedicine visit and that the visit is being conducted throughthe Epic Embedded platform. She agrees to proceed..  My office door was closed. No one else was in the room.  She acknowledged consent and understanding of privacy and security of the video platform. The patient has agreed to participate and understands they can discontinue the visit at any time.    Patient is aware this is a billable service.     Subjective  Sanchez Shields is a 29 y.o. female who presented for follow up therapy session .  Pt gave birth on 3/18/24 to Ollie CABRALES.  Pt was initially breast feeding but noticed the baby wasn't having any wet diapers for long periods of time.  Pt and her  were instructed to take the baby to the ER.  The baby did have some weight loss and had low blood sugars.   She was admitted for a few days.  Pt found breast feeding to be very painful and she has an appointment with lactation today.  In the meantime, she has been pumping and supplementing with formula.  This also gives pt's  an opportunity to feed the baby.  Pt is getting plenty of fluids and she has a good appetite.  She does feel tense but otherwise is doing well emotionally at this time.  She is taking time to do pelvic floor exercises and going for walks.        HPI     Past Medical History:   Diagnosis Date    Acute sinusitis 02/14/2019    Acute suppurative otitis media of right ear without spontaneous rupture of tympanic membrane 02/15/2019    Asthma     stable on albuterol inhaler    Central retinal vein occlusion of left eye 2020    vision spared, some spots    Situational anxiety 11/13/2018    managed with no medications currently    Viral URI with cough 08/23/2018       Past Surgical History:   Procedure Laterality Date    CHOLECYSTECTOMY  12/2017    WISDOM TOOTH EXTRACTION         Current Outpatient Medications   Medication Sig Dispense Refill    acetaminophen (TYLENOL) 325 mg tablet Take 2 tablets (650 mg total) by mouth every 4 (four) hours as needed for mild pain      APNO compound (mupirocin ointment 2%-betamethasone ointment 0.1%-miconazole powder 2%) Apply 1 Application topically as needed for discomfort or pain Apply a small amount to nipple after each feeding. Clean nipple w/soap and water before next feeding. 30 g 0    benzocaine-menthol-lanolin-aloe (DERMOPLAST) 20-0.5 % topical spray Apply 1 Application topically every 6 (six) hours as needed for mild pain or irritation      Calcium Carbonate (CALCIUM 500 PO) Take 500 mg by mouth in the morning      docusate sodium (COLACE) 100 mg capsule Take 1 capsule (100 mg total) by mouth 2 (two) times a day as needed for constipation 30 capsule 0    enoxaparin (LOVENOX) 40 mg/0.4 mL Inject 0.4 mL (40 mg total) under the skin every 24 hours 16.4 mL 0     "Ferrous Sulfate (Iron) 28 MG TABS Take by mouth      hydrocortisone 1 % cream Apply 1 Application topically daily as needed for irritation or rash      ibuprofen (MOTRIN) 600 mg tablet Take 1 tablet (600 mg total) by mouth every 6 (six) hours 30 tablet 0    MAGNESIUM PO Take by mouth      polyethylene glycol (MIRALAX) 17 g packet Take 17 g by mouth daily      Prenatal Vit-Fe Fumarate-FA (PRENATAL VITAMIN PO) Take by mouth      witch hazel-glycerin (TUCKS) topical pad Apply 1 Pad topically every 4 (four) hours as needed for irritation       No current facility-administered medications for this visit.        Allergies   Allergen Reactions    Sprintec 28 [Norgestimate-Eth Estradiol] Other (See Comments)     Blood clot in eye    Sulfa Antibiotics Rash       Review of Systems    Video Exam    There were no vitals filed for this visit.    Physical Exam     Behavioral Health Psychotherapy Progress Note    Psychotherapy Provided: Individual Psychotherapy     1. Anxiety            Goals addressed in session: Goal 1     DATA:   During this session, this clinician used the following therapeutic modalities: Engagement Strategies, Client-centered Therapy, Mindfulness-based Strategies, and Supportive Psychotherapy    Substance Abuse was not addressed during this session. If the client is diagnosed with a co-occurring substance use disorder, please indicate any changes in the frequency or amount of use: na. Stage of change for addressing substance use diagnoses: No substance use/Not applicable    ASSESSMENT:  Sanchez Shields presents with a Euthymic/ normal mood.     her affect is Normal range and intensity, which is congruent, with her mood and the content of the session. The client has made progress on their goals.     Sanchez Shields presents with a none risk of suicide, none risk of self-harm, and none risk of harm to others.    For any risk assessment that surpasses a \"low\" rating, a safety plan must be developed.    A safety plan was " indicated: no  If yes, describe in detail na    PLAN: Between sessions, Sanchez Shields will continue to utilize coping skills. At the next session, the therapist will use Engagement Strategies, Client-centered Therapy, Mindfulness-based Strategies, and Supportive Psychotherapy to address anxiety.    Behavioral Health Treatment Plan and Discharge Planning: Sanchez Shields is aware of and agrees to continue to work on their treatment plan. They have identified and are working toward their discharge goals. yes    Visit start and stop times:    03/26/24  Start Time: 1001  Stop Time: 1039  Total Visit Time: 38 minutes

## 2024-03-26 NOTE — PATIENT INSTRUCTIONS
"Continue pumping about every 3 hours during the day and every 4-5 hours at night.  When pumping, cycle your pump through stimulation and expression mode several times in a session to stimulate several let downs until you have expressed enough milk to feed the baby and to achieve breast comfort.  There is no need to \"empty\" the breast completely. Use gentle hands on pumping and hand expression   Maintain your pump as recommended. Use flange that fits comfortably and allows the breast to empty effectively.    Spend as much time as you can skin to skin with your baby.  Refer to your hand out about tips for making more milk.   Our doctor's are available for more evaluation and support for milk production if you desire.  Please call with any questions or concerns.  "

## 2024-03-26 NOTE — PROGRESS NOTES
INITIAL BREAST FEEDING EVALUATION    Informant/Relationship: Sanchez and her mom, Lucina    Discussion of General Lactation Issues: Breastfeeding has been painful sine the beginning.  Ollie had some trouble latching. Her latch was shallow and her lips did not flange on the breast. Things did not improve at home after discharge. Ollie was readmitted to Peds on DOL #4 due to weight loss and dehydration.  Since then, Sanchez is exclusively pumping and bottle feeding due to concerns that Ollie is not getting enough milk from the breast and due to her pain.  She is supplementing with formula because she has not been able to express all of the milk that Ollie needs.  She does not desire to directly breastfeed at this time.    Infant is 8 days old today.        History:  Fertility Problem:no  Breast changes:yes - areolas were larger and darker.  Breasts were a little tender in the beginning. There was very little change in the size or the fullness of her breasts.  : yes - induced due to maternal use of Lovenox  Full term:yes - 39 4/7 weeks   labor:no  First nursing/attempt < 1 hour after birth:yes - baby latched in the delivery room.  Baby was positioned at the breast by the delivery room nurse  Skin to skin following delivery:yes - in the delivery room for about an hour  Breast changes after delivery:yes - breasts feel marcus.  Milk was in by day 4  Rooming in (infant in room with mother with exception of procedures, eg. Circumcision:  yes  Blood sugar issues: not during initial hospital stay but did have low blood sugar when readmitted on DOL #4  NICU stay:no  Jaundice:no  Phototherapy:no  Supplement given: (list supplement and method used as well as reason(s): none during initial hospital stay.  Was fed formula via bottle during readmission.    Past Medical History:   Diagnosis Date    Acute sinusitis 2019    Acute suppurative otitis media of right ear without spontaneous rupture of tympanic  membrane 02/15/2019    Asthma     stable on albuterol inhaler    Central retinal vein occlusion of left eye 2020    vision spared, some spots    Situational anxiety 11/13/2018    managed with no medications currently    Viral URI with cough 08/23/2018         Current Outpatient Medications:     acetaminophen (TYLENOL) 325 mg tablet, Take 2 tablets (650 mg total) by mouth every 4 (four) hours as needed for mild pain, Disp: , Rfl:     APNO compound (mupirocin ointment 2%-betamethasone ointment 0.1%-miconazole powder 2%), Apply 1 Application topically as needed for discomfort or pain Apply a small amount to nipple after each feeding. Clean nipple w/soap and water before next feeding., Disp: 30 g, Rfl: 0    benzocaine-menthol-lanolin-aloe (DERMOPLAST) 20-0.5 % topical spray, Apply 1 Application topically every 6 (six) hours as needed for mild pain or irritation, Disp: , Rfl:     Calcium Carbonate (CALCIUM 500 PO), Take 500 mg by mouth in the morning, Disp: , Rfl:     docusate sodium (COLACE) 100 mg capsule, Take 1 capsule (100 mg total) by mouth 2 (two) times a day as needed for constipation, Disp: 30 capsule, Rfl: 0    enoxaparin (LOVENOX) 40 mg/0.4 mL, Inject 0.4 mL (40 mg total) under the skin every 24 hours, Disp: 16.4 mL, Rfl: 0    Ferrous Sulfate (Iron) 28 MG TABS, Take by mouth, Disp: , Rfl:     hydrocortisone 1 % cream, Apply 1 Application topically daily as needed for irritation or rash, Disp: , Rfl:     ibuprofen (MOTRIN) 600 mg tablet, Take 1 tablet (600 mg total) by mouth every 6 (six) hours, Disp: 30 tablet, Rfl: 0    MAGNESIUM PO, Take by mouth, Disp: , Rfl:     polyethylene glycol (MIRALAX) 17 g packet, Take 17 g by mouth daily, Disp: , Rfl:     Prenatal Vit-Fe Fumarate-FA (PRENATAL VITAMIN PO), Take by mouth, Disp: , Rfl:     witch hazel-glycerin (TUCKS) topical pad, Apply 1 Pad topically every 4 (four) hours as needed for irritation, Disp: , Rfl:     Allergies   Allergen Reactions    Sprintec 28  [Norgestimate-Eth Estradiol] Other (See Comments)     Blood clot in eye    Sulfa Antibiotics Rash       Social History     Substance and Sexual Activity   Drug Use No       Social History Never a smoker    Interval Breastfeeding History:  Frequency of breast feeding: Has not attempted since Ollie's readmission  Does mother feel breastfeeding is effective: No  Does infant appear satisfied after nursing:No  Stooling pattern normal: Yes  Urinating frequently:Yes  Using shield or shells: No    Alternative/Artificial Feedings:   Bottle: Yes, Ollie is exclusively bottle fed at this time.  Berrios Lulú size 0.  Not using paced feeding technique.   Cup: No  Syringe/Finger: No           Formula Type: Similac 360 Total Care                     Amount: 3-4 ounces when expressed milk is not available            Breast Milk:                      Amount: 3-4 ounces when available (typically 50% of the time)            Frequency Q 2-3 Hr between feedings  Elimination Problems: No      Equipment:  Nipple Shield             Type: none             Size: n/a             Frequency of Use: n/a  Pump            Type: Spectra S2            Frequency of Use: Every 3 hours during the day and every 4-5 hours at night.  Expresses up to 1.5 ounces per session.  Shells            Type: none            Frequency of use: n/a    Equipment Problems: yes Sanchez is struggling to find the flange that fits well.    Mom:  Breast: Small symmetrical breasts.  Conical shape.  Widely spaced.  Some full glandular tissue palpable just distal to the areolas.  Areolas are slightly large in proportion to the size of the breasts.  Nipple Assessment in General: Normal: elongated/eraser, no discoloration and no damage noted.  Mother's Awareness of Feeding Cues                 Recognizes: Yes                  Verbalizes: Yes  Support System: FOB, extended family  History of Breastfeeding: none  Changes/Stressors/Violence: Sanchez is concerned about her milk  production.  Concerns/Goals: Sanchez desires to produce as much milk as possible.    Problems with Mom: insufficient lactation.    Physical Exam  Constitutional:       Appearance: Normal appearance.   HENT:      Head: Normocephalic and atraumatic.   Pulmonary:      Effort: Pulmonary effort is normal.   Musculoskeletal:         General: Normal range of motion.      Cervical back: Normal range of motion and neck supple.   Neurological:      Mental Status: She is alert and oriented to person, place, and time.   Skin:     General: Skin is warm and dry.   Psychiatric:         Mood and Affect: Mood normal.         Behavior: Behavior normal.         Thought Content: Thought content normal.         Judgment: Judgment normal.     Pumping Session:  Sanchez used her Spectra S2 with 19mm flanges for this session.  Pumping was comfortable and flange size appeared appropriate.    Handouts:   None    Education:  Reviewed Frequency/Supply & Demand: Discussed how milk production is established and maintained and factors that can impact milk production.  Reviewed Alternative/Artificial Feedings: Discussed and demonstrated paced bottle feeding.  Reviewed Mom/Breast care: discussed appropriate handling of the breasts to avoid inflammation, pain and injury.  Reviewed Equipment: discussed and demonstrated the use and features of the Spectra S2 and how to determine what size flange to use.      Plan:    Reassurance and support given.  I acknowledged Sanchez's concerns and her desire to provide milk for her baby.  I encouraged her to continue pumping every 3 hours during the day and every 4-5 hours at night, or as often as she desires.  She was taught effective use of her pump.  Her flange appeared to fit well.  I reviewed with her that her insufficient lactation may be due to insufficient glandular tissue (IGT).  I offered her additional support from our breastfeeding medicine physician if she desires.  I did not discuss herbal galactagogues with  her due to her bleeding disorder and lovenox use.  I encouraged her to call with any questions or concerns.    I have spent 90 minutes with Patient and family today in which greater than 50% of this time was spent in counseling/coordination of care regarding Instructions for management and Patient and family education.

## 2024-03-29 ENCOUNTER — TELEPHONE (OUTPATIENT)
Dept: BEHAVIORAL/MENTAL HEALTH CLINIC | Facility: CLINIC | Age: 29
End: 2024-03-29

## 2024-03-29 NOTE — TELEPHONE ENCOUNTER
Left voicemail informing patient and/or parent/guardian of the Psych Encounter form needing to be signed as a requirement from the insurance company for billing purposes. Patient can access form via [employer] and sign electronically.     Please make patient aware this form must be signed for each visit as a requirement to continue future visits with provider.

## 2024-03-30 NOTE — PROGRESS NOTES
I have reviewed the notes, assessments, and/or procedures performed by Maria De Jesus Zavala RN, IBCLC, I concur with her/his documentation of Sanchez Newton MD 03/30/24

## 2024-04-09 ENCOUNTER — POSTPARTUM VISIT (OUTPATIENT)
Dept: OBGYN CLINIC | Facility: CLINIC | Age: 29
End: 2024-04-09

## 2024-04-09 VITALS — DIASTOLIC BLOOD PRESSURE: 68 MMHG | WEIGHT: 178 LBS | SYSTOLIC BLOOD PRESSURE: 102 MMHG | BODY MASS INDEX: 29.62 KG/M2

## 2024-04-09 PROCEDURE — 99024 POSTOP FOLLOW-UP VISIT: CPT | Performed by: PHYSICIAN ASSISTANT

## 2024-04-09 NOTE — PROGRESS NOTES
"Sanchez Cornelius  1995    S:  The patient is a 29 y.o. who presents for a postpartum visit. She is 3 weeks postpartum following a  delivery. The delivery was at 39 gestational weeks. I have fully reviewed the prenatal and intrapartum course. Complications include: hx of central retinal vein occlusion on prophylactic Lovenox in pregnancy;1st degree laceration.        Postpartum course has been complicated by some difficulties with breast feeding.  She has since worked out a pumping schedule and is supplementing with formula and doing much better on this regimen.  She reports feeling some guilt surrounding stopping breast-feeding.  While baby \" Ollie\" is doing well. Bleeding has slowed to light spotting     Bowel function is normal. Bladder function is normal. Patient has not been sexually active.          She admits to some postpartum blues/depression.  Reports a lot of guilt surrounding breast-feeding troubles.  But she is working through this and starting to feel better.  She is established with a counselor through [facility].  Counseling sessions have been working well for her.  She does not feel she needs to restart an SSRI at this point in time.  She did do well with Lexapro in the past and would consider restarting this in the future if symptoms ever worsen.  Franklin Scale=10    Last Pap: 3/2022 negative    We discussed contraceptive options in detail including birth control pills, patches, NuvaRing, DepoProvera, Mirena/Kyleena IUD, Nexplanon in detail.  At this point she would like condoms    Review of Systems   Respiratory: Negative.    Cardiovascular: Negative.    Gastrointestinal: Negative for constipation and diarrhea.   Genitourinary: Negative for difficulty urinating, pelvic pain, vaginal bleeding, vaginal discharge, itching or odor.      Past Medical History:   Diagnosis Date    Acute sinusitis 2019    Acute suppurative otitis media of right ear without spontaneous rupture of tympanic " membrane 02/15/2019    Asthma     stable on albuterol inhaler    Central retinal vein occlusion of left eye 2020    vision spared, some spots    Situational anxiety 11/13/2018    managed with no medications currently    Viral URI with cough 08/23/2018     Family History   Problem Relation Age of Onset    Hypertension Mother     Diabetes Mother     Hyperlipidemia Mother     SRUTHI disease Mother     Hyperlipidemia Father     Asthma Father     COPD Father     Stroke Maternal Grandmother     Hypertension Maternal Grandmother     Dementia Maternal Grandmother     Prostate cancer Maternal Grandfather     Lung cancer Maternal Grandfather     Stroke Paternal Grandmother     Diabetes Paternal Grandfather     Heart disease Paternal Grandfather      Social History     Socioeconomic History    Marital status: /Civil Union     Spouse name: None    Number of children: None    Years of education: None    Highest education level: None   Occupational History    None   Tobacco Use    Smoking status: Never    Smokeless tobacco: Never   Vaping Use    Vaping status: Never Used   Substance and Sexual Activity    Alcohol use: Not Currently     Comment: social drinking prior to pregnancy    Drug use: No    Sexual activity: Not Currently     Partners: Male     Birth control/protection: None     Comment: Currently not on OCP after 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 CRVO   Other Topics Concern    None   Social History Narrative    None     Social Determinants of Health     Financial Resource Strain: Not on file   Food Insecurity: No Food Insecurity (3/18/2024)    Hunger Vital Sign     Worried About Running Out of Food in the Last Year: Never true     Ran Out of Food in the Last Year: Never true   Transportation Needs: No Transportation Needs (3/18/2024)    PRAPARE - Transportation     Lack of Transportation (Medical): No     Lack of Transportation (Non-Medical): No   Physical Activity: Not on file   Stress: Not on file   Social Connections: Not on file    Intimate Partner Violence: Not on file   Housing Stability: Low Risk  (3/18/2024)    Housing Stability Vital Sign     Unable to Pay for Housing in the Last Year: No     Number of Places Lived in the Last Year: 1     Unstable Housing in the Last Year: No       O:   Vitals:    04/09/24 1005   BP: 102/68     Wt 80.7 kg (178 lb)   LMP 06/15/2023   Breastfeeding Yes Comment: pumping  BMI 29.62 kg/m²     She appears well and in no distress  Abdomen is soft and nontender  External genitals are normal. Site of first degree laceration site healing well. Small visible suture still visible but no evidence of separation or dehiscence     A/P:  Postpartum visit.  Hx of central retinal vein occlusion- continue Lovenox until 6 weeks PP  Contraception -    condoms for now. May consider IUD for the future  Pap up to date     She will return in 3 weeks for mood and laceration check .

## 2024-04-12 RX ORDER — ENOXAPARIN SODIUM 100 MG/ML
40 INJECTION SUBCUTANEOUS
Qty: 12 ML | Refills: 0 | Status: SHIPPED | OUTPATIENT
Start: 2024-04-12 | End: 2024-05-23

## 2024-04-12 NOTE — TELEPHONE ENCOUNTER
Patient called and stated her refills were stopped and she  is suppose to be taking this medication until 3 more weeks.She has one more left and she will be out..Pharmacy  on file verified.

## 2024-04-15 ENCOUNTER — PROCEDURE VISIT (OUTPATIENT)
Age: 29
End: 2024-04-15
Payer: COMMERCIAL

## 2024-04-15 VITALS
HEIGHT: 65 IN | OXYGEN SATURATION: 97 % | SYSTOLIC BLOOD PRESSURE: 100 MMHG | DIASTOLIC BLOOD PRESSURE: 60 MMHG | BODY MASS INDEX: 29.66 KG/M2 | WEIGHT: 178 LBS | HEART RATE: 91 BPM

## 2024-04-15 DIAGNOSIS — M99.03 SEGMENTAL DYSFUNCTION OF LUMBAR REGION: Primary | ICD-10-CM

## 2024-04-15 DIAGNOSIS — M54.6 RIGHT-SIDED THORACIC BACK PAIN, UNSPECIFIED CHRONICITY: ICD-10-CM

## 2024-04-15 DIAGNOSIS — M99.02 SEGMENTAL DYSFUNCTION OF THORACIC REGION: ICD-10-CM

## 2024-04-15 DIAGNOSIS — M99.04 SEGMENTAL DYSFUNCTION OF SACRAL REGION: ICD-10-CM

## 2024-04-15 DIAGNOSIS — M54.59 MECHANICAL LOW BACK PAIN: ICD-10-CM

## 2024-04-15 DIAGNOSIS — M99.01 SEGMENTAL DYSFUNCTION OF CERVICAL REGION: ICD-10-CM

## 2024-04-15 PROCEDURE — 98941 CHIROPRACT MANJ 3-4 REGIONS: CPT | Performed by: CHIROPRACTOR

## 2024-04-15 NOTE — PROGRESS NOTES
Diagnoses and all orders for this visit:    Segmental dysfunction of lumbar region    Mechanical low back pain    Segmental dysfunction of cervical region    Segmental dysfunction of thoracic region    Segmental dysfunction of sacral region    Right-sided thoracic back pain, unspecified chronicity      ASSESSMENT:   Pt's symptoms and exam findings consistent with mechanical lower back and mid back pain secondary to physical stressors of pregnancy and repetitive st/sp injury, exacerbated by postural/ergonomic stressors. Pt responded well to stretches and manual mobilization of the affected spinal and myofascial tissues with increased ROM; trial of conservative tx recommended consisting of stretching, ther-ex, graded mobilization/manipulation of the affected spinal/myofascial tissues, postural/ergonomic education and take home stretches/exercises. If symptoms fail to improve with short trial of conservative care, appropriate imaging and referral will be coordinated.  - Tolerating treatment well with decrease in pain.     PROCEDURE CODES: 57593    TREATMENT:  Fear avoidance behavior discussion, encouraged and reassured pt that natural course of condition is to improve over time with adherence to tx plan and home care strategies. Home care recommendations: avoid bed rest, walk (but avoid trails and uneven surfaces), gradual return to activity to tolerance (avoid anything that peripheralizes symptoms), ice 20 min on/off, watch for ice burn, call if symptoms peripheralize, worsen, or neurologic deficit progresses. Ther-ex: IASTM - discussed post procedure soreness and/or ecchymosis for up to 36 hrs, applied to affected mm hypertonicities; wall cheryl, axial retraction, upper trap stretch, lev scap stretch, SCM stretch, lat rows with t-band, lat pull down with t-band, abdominal bracing; greater than 15 min spent performing above mentioned ther-ex. Thoracic mobilization/manipulation: prone P-A mob, supine A-P manip; cervical  mobilization/manipulation: diversified supine graded mobilization, cervical traction, Lumbar side lying mobilization, hip distraction- long axis    HPI:  Sanchez Shields is a 29 y.o. female   Chief Complaint   Patient presents with   • Neck Pain     Neck pain-4   • Back Pain     Middle back pain-0  Lower back pain-0     The patient reports that she is feeling typical lower neck and into the mid back pain she mentions previous treatment was chiropractic adjustment or myofascial release but it was short lived. The patient also self treatment with tennis ball. Exercise routine- stretching and yoga at home- concentrating on stretches, Today she is 24 weeks pregnant. Pt  at a "[employer]. The patient reports there is no heavy lifting, but at times a lot of walking.  12/22- The patient is feeling ok. The patient reports she has been doing the exercises.  1/12- The patient reports increased pain in the mid back and neck after she mentions that an employee at her job left and she is now doing more desk work and can not get up as often  1/26-The patient is feeling a little tight in the lower back and into the hips but overall improvements.  4/15- The patient is feeling some upper back pain since giving birth to daughter a month ago.    Neck Pain   Associated symptoms include headaches.   Back Pain  Associated symptoms include headaches.     Past Medical History:   Diagnosis Date   • Acute sinusitis 02/14/2019   • Acute suppurative otitis media of right ear without spontaneous rupture of tympanic membrane 02/15/2019   • Asthma     stable on albuterol inhaler   • Central retinal vein occlusion of left eye 2020    vision spared, some spots   • Situational anxiety 11/13/2018    managed with no medications currently   • Viral URI with cough 08/23/2018      The following portions of the patient's history were reviewed and updated as appropriate: allergies, past family history, past medical history, past social history,  past surgical history, and problem list.  Review of Systems   Musculoskeletal:  Positive for back pain and neck pain.   Neurological:  Positive for headaches.     Physical Exam  Musculoskeletal:         General: Tenderness present.      Cervical back: Rigidity, spasms and tenderness present. No swelling, edema, deformity, erythema, signs of trauma, lacerations, torticollis, bony tenderness or crepitus. Pain with movement present. Decreased range of motion.      Thoracic back: Spasms present. No swelling, edema, deformity, signs of trauma, lacerations or bony tenderness. Decreased range of motion. No scoliosis.      Lumbar back: Spasms present. No swelling, edema, deformity, signs of trauma, lacerations or bony tenderness. Decreased range of motion. Negative right straight leg raise test. No scoliosis.        Back:        SOFT TISSUE ASSESSMENT: Hypertonicity and tenderness palpated C5-T6 T9-12, L2-S1, MAGDI SIJ erector spinae, upper traps, lev scap, SCM, scalene, rhomboid, suboccipitals, QL R, Glute, piriformis- R, Middle/lower trap R JOINT RECTRICTIONS: C5-T6, T9-12, L2-S1, MAGDI SIJORTHO: Renetta unremarkable for centralization/peripheralization; max foraminal comp elicits local np R/L; shoulder depression elicits stiffness in R/L upper trap; brachial plexus tension test elicits neural tension in R/L UE; cervical distraction elicits relieves CC, SLUMP- Neg, SLR- Neg, Fabere- positive SIJ pain    Return if symptoms worsen or fail to improve.

## 2024-04-25 ENCOUNTER — PROCEDURE VISIT (OUTPATIENT)
Age: 29
End: 2024-04-25
Payer: COMMERCIAL

## 2024-04-25 ENCOUNTER — TELEMEDICINE (OUTPATIENT)
Dept: BEHAVIORAL/MENTAL HEALTH CLINIC | Facility: CLINIC | Age: 29
End: 2024-04-25
Payer: COMMERCIAL

## 2024-04-25 VITALS
WEIGHT: 178 LBS | SYSTOLIC BLOOD PRESSURE: 120 MMHG | HEIGHT: 65 IN | DIASTOLIC BLOOD PRESSURE: 61 MMHG | BODY MASS INDEX: 29.66 KG/M2 | HEART RATE: 65 BPM

## 2024-04-25 DIAGNOSIS — M99.03 SEGMENTAL DYSFUNCTION OF LUMBAR REGION: Primary | ICD-10-CM

## 2024-04-25 DIAGNOSIS — F41.9 ANXIETY: Primary | ICD-10-CM

## 2024-04-25 DIAGNOSIS — M99.02 SEGMENTAL DYSFUNCTION OF THORACIC REGION: ICD-10-CM

## 2024-04-25 DIAGNOSIS — M99.04 SEGMENTAL DYSFUNCTION OF SACRAL REGION: ICD-10-CM

## 2024-04-25 DIAGNOSIS — M54.59 MECHANICAL LOW BACK PAIN: ICD-10-CM

## 2024-04-25 DIAGNOSIS — M54.6 RIGHT-SIDED THORACIC BACK PAIN, UNSPECIFIED CHRONICITY: ICD-10-CM

## 2024-04-25 DIAGNOSIS — M99.01 SEGMENTAL DYSFUNCTION OF CERVICAL REGION: ICD-10-CM

## 2024-04-25 PROCEDURE — 90832 PSYTX W PT 30 MINUTES: CPT | Performed by: SOCIAL WORKER

## 2024-04-25 PROCEDURE — 98941 CHIROPRACT MANJ 3-4 REGIONS: CPT | Performed by: CHIROPRACTOR

## 2024-04-25 NOTE — PROGRESS NOTES
Diagnoses and all orders for this visit:    Segmental dysfunction of lumbar region    Mechanical low back pain    Segmental dysfunction of cervical region    Segmental dysfunction of thoracic region    Segmental dysfunction of sacral region    Right-sided thoracic back pain, unspecified chronicity      ASSESSMENT:   Pt's symptoms and exam findings consistent with mechanical lower back and mid back pain secondary to physical stressors of pregnancy and repetitive st/sp injury, exacerbated by postural/ergonomic stressors. Pt responded well to stretches and manual mobilization of the affected spinal and myofascial tissues with increased ROM; trial of conservative tx recommended consisting of stretching, ther-ex, graded mobilization/manipulation of the affected spinal/myofascial tissues, postural/ergonomic education and take home stretches/exercises. If symptoms fail to improve with short trial of conservative care, appropriate imaging and referral will be coordinated.  - Tolerating treatment well with decrease in pain.     PROCEDURE CODES: 65779    TREATMENT:  Fear avoidance behavior discussion, encouraged and reassured pt that natural course of condition is to improve over time with adherence to tx plan and home care strategies. Home care recommendations: avoid bed rest, walk (but avoid trails and uneven surfaces), gradual return to activity to tolerance (avoid anything that peripheralizes symptoms), ice 20 min on/off, watch for ice burn, call if symptoms peripheralize, worsen, or neurologic deficit progresses. Ther-ex: IASTM - discussed post procedure soreness and/or ecchymosis for up to 36 hrs, applied to affected mm hypertonicities; wall cheryl, axial retraction, upper trap stretch, lev scap stretch, SCM stretch, lat rows with t-band, lat pull down with t-band, abdominal bracing; greater than 15 min spent performing above mentioned ther-ex. Thoracic mobilization/manipulation: prone P-A mob, supine A-P manip; cervical  mobilization/manipulation: diversified supine graded mobilization, cervical traction, Lumbar side lying mobilization, hip distraction- long axis    HPI:  Sanchez Shields is a 29 y.o. female   Chief Complaint   Patient presents with   • Neck - Pain     Neck pain that radiates down right shoulder with dull pain. Pain score 4-5        The patient reports that she is feeling typical lower neck and into the mid back pain she mentions previous treatment was chiropractic adjustment or myofascial release but it was short lived. The patient also self treatment with tennis ball. Exercise routine- stretching and yoga at home- concentrating on stretches, Today she is 24 weeks pregnant. Pt  at a [employer]. The patient reports there is no heavy lifting, but at times a lot of walking.  12/22- The patient is feeling ok. The patient reports she has been doing the exercises.  1/12- The patient reports increased pain in the mid back and neck after she mentions that an employee at her job left and she is now doing more desk work and can not get up as often  1/26-The patient is feeling a little tight in the lower back and into the hips but overall improvements.  4/15- The patient is feeling some upper back pain since giving birth to daughter a month ago.  4/25- The patient is feeling pain is a 4-5/10    Neck Pain   Associated symptoms include headaches.   Back Pain  Associated symptoms include headaches.     Past Medical History:   Diagnosis Date   • Acute sinusitis 02/14/2019   • Acute suppurative otitis media of right ear without spontaneous rupture of tympanic membrane 02/15/2019   • Asthma     stable on albuterol inhaler   • Central retinal vein occlusion of left eye 2020    vision spared, some spots   • Situational anxiety 11/13/2018    managed with no medications currently   • Viral URI with cough 08/23/2018      The following portions of the patient's history were reviewed and updated as appropriate: allergies, past  family history, past medical history, past social history, past surgical history, and problem list.  Review of Systems   Musculoskeletal:  Positive for back pain and neck pain.   Neurological:  Positive for headaches.     Physical Exam  Musculoskeletal:         General: Tenderness present.      Cervical back: Rigidity, spasms and tenderness present. No swelling, edema, deformity, erythema, signs of trauma, lacerations, torticollis, bony tenderness or crepitus. Pain with movement present. Decreased range of motion.      Thoracic back: Spasms present. No swelling, edema, deformity, signs of trauma, lacerations or bony tenderness. Decreased range of motion. No scoliosis.      Lumbar back: Spasms present. No swelling, edema, deformity, signs of trauma, lacerations or bony tenderness. Decreased range of motion. Negative right straight leg raise test. No scoliosis.        Back:        SOFT TISSUE ASSESSMENT: Hypertonicity and tenderness palpated C5-T6 T9-12, L2-S1, MAGDI SIJ erector spinae, upper traps, lev scap, SCM, scalene, rhomboid, suboccipitals, QL R, Glute, piriformis- R, Middle/lower trap R JOINT RECTRICTIONS: C5-T6, T9-12, L2-S1, MAGDI SIJORTHO: Renetta unremarkable for centralization/peripheralization; max foraminal comp elicits local np R/L; shoulder depression elicits stiffness in R/L upper trap; brachial plexus tension test elicits neural tension in R/L UE; cervical distraction elicits relieves CC, SLUMP- Neg, SLR- Neg, Fabere- positive SIJ pain    Return in about 1 week (around 5/2/2024) for Recheck.

## 2024-04-25 NOTE — PSYCH
"  Virtual Regular Visit    Verification of patient location:    Patient is located at Home in the following state in which I hold an active license [place]      Assessment/Plan:    Problem List Items Addressed This Visit    None  Visit Diagnoses       Anxiety    -  Primary            Goals addressed in session: Goal 1          Reason for visit is No chief complaint on file.       Encounter provider Patria Serna LCSW      Recent Visits  No visits were found meeting these conditions.  Showing recent visits within past 7 days and meeting all other requirements  Today's Visits  Date Type Provider Dept   04/25/24 Telemedicine Patria Serna LCSW [facility]   Showing today's visits and meeting all other requirements  Future Appointments  No visits were found meeting these conditions.  Showing future appointments within next 150 days and meeting all other requirements       The patient was identified by name and date of birth. Sanchez Shields was informed that this is a telemedicine visit and that the visit is being conducted throughthe Epic Embedded platform. She agrees to proceed..  My office door was closed. No one else was in the room.  She acknowledged consent and understanding of privacy and security of the video platform. The patient has agreed to participate and understands they can discontinue the visit at any time.    Patient is aware this is a billable service.     Subjective  Sanchez Shields is a 29 y.o. female who presented for follow up therapy session .  Pt reported that things have been going \"really well.\"  She and her  are getting used to the \"new normal.\"  Pt is more comfortable asking her family for help with watching the baby as needed.  They are all communicating well regarding how to care for the baby (feedings, napping).  The baby has since outgrown her bassinet and is now in the pack n' play.  Pt is getting outside and getting more movement in every day.  Pt's sleep is \"okay\" and " "her appetite is \"great.\"   Pt continues to pump whatever breast milk she can get and is supplementing with formula.  Overall, pt is feeling much better and is ready for discharge.  Informed pt she may call at any time should she feel it necessary to resume services.       HPI     Past Medical History:   Diagnosis Date    Acute sinusitis 02/14/2019    Acute suppurative otitis media of right ear without spontaneous rupture of tympanic membrane 02/15/2019    Asthma     stable on albuterol inhaler    Central retinal vein occlusion of left eye 2020    vision spared, some spots    Situational anxiety 11/13/2018    managed with no medications currently    Viral URI with cough 08/23/2018       Past Surgical History:   Procedure Laterality Date    CHOLECYSTECTOMY  12/2017    WISDOM TOOTH EXTRACTION         Current Outpatient Medications   Medication Sig Dispense Refill    enoxaparin (LOVENOX) 40 mg/0.4 mL Inject 0.4 mL (40 mg total) under the skin every 24 hours 12 mL 0    Ferrous Sulfate (Iron) 28 MG TABS Take by mouth      MAGNESIUM PO Take by mouth      Prenatal Vit-Fe Fumarate-FA (PRENATAL VITAMIN PO) Take by mouth       No current facility-administered medications for this visit.        Allergies   Allergen Reactions    Sprintec 28 [Norgestimate-Eth Estradiol] Other (See Comments)     Blood clot in eye    Sulfa Antibiotics Rash       Review of Systems    Video Exam    There were no vitals filed for this visit.    Physical Exam     Behavioral Health Psychotherapy Progress Note    Psychotherapy Provided: Individual Psychotherapy     1. Anxiety            Goals addressed in session: Goal 1     DATA:   During this session, this clinician used the following therapeutic modalities: Engagement Strategies, Client-centered Therapy, Mindfulness-based Strategies, and Supportive Psychotherapy    Substance Abuse was not addressed during this session. If the client is diagnosed with a co-occurring substance use disorder, please " "indicate any changes in the frequency or amount of use: na. Stage of change for addressing substance use diagnoses: No substance use/Not applicable    ASSESSMENT:  Sanchez Shields presents with a Euthymic/ normal mood.     her affect is Normal range and intensity, which is congruent, with her mood and the content of the session. The client has made progress on their goals.     Sanchez Shields presents with a none risk of suicide, none risk of self-harm, and none risk of harm to others.    For any risk assessment that surpasses a \"low\" rating, a safety plan must be developed.    A safety plan was indicated: no  If yes, describe in detail na    PLAN: DISCHARGE    Psychotherapy Discharge Summary    Preferred Name: Sanchez Shields  YOB: 1995    Admission date to psychotherapy: 1/11/24    Referred by: Serena Canales DO    Presenting Problem: Anxiety in pregnancy    Course of treatment included : individual therapy     Progress/Outcome of Treatment Goals (brief summary of course of treatment) Pt presented for individual therapy due to feelings of anxiousness and being overwhelmed during pregnancy.  She attended all sessions and was cooperative in treatment.    Treatment Complications (if any): NA    Treatment Progress: excellent    Current [facility] Provider: NA    Discharge Medications include: NA    Discharge Date: 4/25/24    Discharge Diagnosis:   1. Anxiety            Criteria for Discharge: completed treatment goals and objectives and is no longer in need of services    Aftercare recommendations include (include specific referral names and phone numbers, if appropriate): Pt may call to resume services if necessary.    Prognosis: excellent      Visit start and stop times:    04/25/24  Start Time: 1359  Stop Time: 1424  Total Visit Time: 25 minutes        "

## 2024-04-30 ENCOUNTER — POSTPARTUM VISIT (OUTPATIENT)
Dept: OBGYN CLINIC | Facility: CLINIC | Age: 29
End: 2024-04-30
Payer: COMMERCIAL

## 2024-04-30 VITALS — DIASTOLIC BLOOD PRESSURE: 60 MMHG | SYSTOLIC BLOOD PRESSURE: 96 MMHG | BODY MASS INDEX: 29.29 KG/M2 | WEIGHT: 176 LBS

## 2024-04-30 NOTE — PROGRESS NOTES
"Sanchezluis miguel Shields  1995    S:  The patient is a 29 y.o. who presents for a postpartum visit. She is 6 weeks postpartum following a  delivery. The delivery was at 39 gestational weeks. I have fully reviewed the prenatal and intrapartum course. Complications include: hx of central retinal vein occlusion on prophylactic Lovenox in pregnancy;1st degree laceration.        Postpartum course has been complicated by some difficulties with breast feeding.  She is now pumping and supplementing with specialty formula. This is working well for pt and baby. Baby \"Ollie\" is doing well- sleeping better through the night.     Bowel function is normal. Bladder function is normal. Patient has not been sexually active.          She presents today to follow-up on postpartum blues/depression.  She was previously experiencing some guilt related to breast-feeding troubles.  Since her last visit she has been able to work through this with her therapist.  She feels she is in a much better headspace since last visit.  She actually had her last counseling session with her therapist this week and does not feel she needs any follow-up at this time.  We had discussed SSRIs at last visit and their benefit in treating postpartum depression.  She does not feel she needs to start an SSRI at this point in time.  Overall is feeling much better than last visit.  Denies SI/HI.      [place] Scale=7    Last Pap: 3/2022 negative    We discussed contraceptive options in detail including birth control pills, patches, NuvaRing, DepoProvera, Mirena/Kyleena IUD, Nexplanon in detail.  At this point she would like condoms    Review of Systems   Respiratory: Negative.    Cardiovascular: Negative.    Gastrointestinal: Negative for constipation and diarrhea.   Genitourinary: Negative for difficulty urinating, pelvic pain, vaginal bleeding, vaginal discharge, itching or odor.      Past Medical History:   Diagnosis Date    Acute sinusitis 2019    Acute " suppurative otitis media of right ear without spontaneous rupture of tympanic membrane 02/15/2019    Asthma     stable on albuterol inhaler    Central retinal vein occlusion of left eye 2020    vision spared, some spots    Situational anxiety 11/13/2018    managed with no medications currently    Viral URI with cough 08/23/2018     Family History   Problem Relation Age of Onset    Hypertension Mother     Diabetes Mother     Hyperlipidemia Mother     SRUTHI disease Mother     Hyperlipidemia Father     Asthma Father     COPD Father     Stroke Maternal Grandmother     Hypertension Maternal Grandmother     Dementia Maternal Grandmother     Prostate cancer Maternal Grandfather     Lung cancer Maternal Grandfather     Stroke Paternal Grandmother     Diabetes Paternal Grandfather     Heart disease Paternal Grandfather      Social History     Socioeconomic History    Marital status: /Civil Union     Spouse name: None    Number of children: None    Years of education: None    Highest education level: None   Occupational History    None   Tobacco Use    Smoking status: Never    Smokeless tobacco: Never   Vaping Use    Vaping status: Never Used   Substance and Sexual Activity    Alcohol use: Not Currently     Comment: social drinking prior to pregnancy    Drug use: No    Sexual activity: Not Currently     Partners: Male     Birth control/protection: None     Comment: Currently not on OCP after 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 CRVO   Other Topics Concern    None   Social History Narrative    None     Social Determinants of Health     Financial Resource Strain: Not on file   Food Insecurity: No Food Insecurity (3/18/2024)    Hunger Vital Sign     Worried About Running Out of Food in the Last Year: Never true     Ran Out of Food in the Last Year: Never true   Transportation Needs: No Transportation Needs (3/18/2024)    PRAPARE - Transportation     Lack of Transportation (Medical): No     Lack of Transportation (Non-Medical): No   Physical  Activity: Not on file   Stress: Not on file   Social Connections: Not on file   Intimate Partner Violence: Not on file   Housing Stability: Low Risk  (3/18/2024)    Housing Stability Vital Sign     Unable to Pay for Housing in the Last Year: No     Number of Places Lived in the Last Year: 1     Unstable Housing in the Last Year: No       O:   Vitals:    04/30/24 0951   BP: 96/60     BP 96/60 (BP Location: Left arm, Patient Position: Sitting, Cuff Size: Large)   Wt 79.8 kg (176 lb)   LMP 06/15/2023   Breastfeeding Yes Comment: pumping  BMI 29.29 kg/m²     She appears well and in no distress  Abdomen is soft and nontender  External genitals are normal. Site of first degree laceration site healed  A/P:  Postpartum visit.  PP blues- resolved with counseling; encouraged to reach out with any return of symptoms  Hx of central retinal vein occlusion- reviewed okay to d/c Lovenox given pt is 6 weeks PP  Contraception -    condoms for now.   Pap up to date     She will return in 3 months for routine annual exam

## 2024-04-30 NOTE — PROGRESS NOTES
Post partum.   Delivered 3/18/24  Pumping / formula   Pap- 3/7/22-negative.   . Apgars 9/9  Female  8lb 2.5oz  Lac - 1st   [place]- 7

## 2024-07-01 ENCOUNTER — TELEPHONE (OUTPATIENT)
Age: 29
End: 2024-07-01

## 2024-07-02 ENCOUNTER — NURSE TRIAGE (OUTPATIENT)
Age: 29
End: 2024-07-02

## 2024-07-02 DIAGNOSIS — B37.31 VULVOVAGINAL CANDIDIASIS: Primary | ICD-10-CM

## 2024-07-02 RX ORDER — FLUCONAZOLE 150 MG/1
150 TABLET ORAL
Qty: 2 TABLET | Refills: 0 | Status: SHIPPED | OUTPATIENT
Start: 2024-07-02 | End: 2024-07-06

## 2024-07-02 NOTE — TELEPHONE ENCOUNTER
"Vaginal itching last month prior to menses-monistat was effective last month for same. Reports creamy white discharge, external itching. Denies odor.   Tried monistat last week with no relief.   Trouble sleeping due to itching.      Would like to try Rx treatment for yeast. In agreement to schedule appointment if no relief.     Advised open to air at bedtime, avoid perfume soaps and products, recommend cotton underwear only, no thongs, wear looser fit clothing, change out of wet clothing after exercise and/or bathing suits ASAP. May use A&D/Aquaphor oint externally to alleviate irration, wash with Cetaphil cleanser, watch sugar and carb intake. If symptoms persist after treatment, develops an abnormal discharge with an odor, contact the office and schedule pelvic exam for further evaluation.     Ernestina, please advise if you are comfortable providing Rx for Diflucan?  She could not schedule next available opeining for 6/3 due to location. Confirmed pharmacy in chart.    Reason for Disposition   MODERATE-SEVERE itching (i.e., interferes with school, work, or sleep)    Answer Assessment - Initial Assessment Questions  1. SYMPTOM: \"What's the main symptom you're concerned about?\" (e.g., pain, itching, dryness)      Vaginal itching  2. LOCATION: \"Where is the  itching located?\" (e.g., inside/outside, left/right)      vagina  3. ONSET: \"When did the  itching  start?\"      Over a week  4. PAIN: \"Is there any pain?\" If Yes, ask: \"How bad is it?\" (Scale: 1-10; mild, moderate, severe)      denies  5. ITCHING: \"Is there any itching?\" If Yes, ask: \"How bad is it?\" (Scale: 1-10; mild, moderate, severe)     Constant mild itch- trouble sleeping  6. CAUSE: \"What do you think is causing the discharge?\" \"Have you had the same problem before? What happened then?\"      White creamy discharge  7. OTHER SYMPTOMS: \"Do you have any other symptoms?\" (e.g., fever, itching, vaginal bleeding, pain with urination, injury to genital area, vaginal " "foreign body)      Denies odor, denies urinary symptoms  8. PREGNANCY: \"Is there any chance you are pregnant?\" \"When was your last menstrual period?\"      LMP 6/8/2024    Protocols used: Vaginal Symptoms-ADULT-[place]    "

## 2024-07-02 NOTE — TELEPHONE ENCOUNTER
Regarding: Yeast infection  ----- Message from Sabine LILLY sent at 7/2/2024  8:26 AM EDT -----  Pt called stating she called yesterday about yeast infection and self treatment that did not work and itching has gotten worse. Pt said no one ever called her back. Only note I saw about a phone call yesterday stated pt treated yeast with Monistat and it did not work and that her pharmacy was verified. No CTS available pt asked for a nurse to please call her back.

## 2024-07-03 ENCOUNTER — OFFICE VISIT (OUTPATIENT)
Dept: INTERNAL MEDICINE CLINIC | Facility: OTHER | Age: 29
End: 2024-07-03
Payer: COMMERCIAL

## 2024-07-03 ENCOUNTER — APPOINTMENT (OUTPATIENT)
Dept: LAB | Facility: IMAGING CENTER | Age: 29
End: 2024-07-03
Payer: COMMERCIAL

## 2024-07-03 VITALS
HEIGHT: 65 IN | HEART RATE: 70 BPM | SYSTOLIC BLOOD PRESSURE: 100 MMHG | OXYGEN SATURATION: 98 % | BODY MASS INDEX: 29.99 KG/M2 | TEMPERATURE: 99.7 F | WEIGHT: 180 LBS | DIASTOLIC BLOOD PRESSURE: 68 MMHG

## 2024-07-03 DIAGNOSIS — Z86.39 HISTORY OF IRON DEFICIENCY: ICD-10-CM

## 2024-07-03 DIAGNOSIS — F41.9 ANXIETY AND DEPRESSION: Primary | ICD-10-CM

## 2024-07-03 DIAGNOSIS — Z13.1 SCREENING FOR DIABETES MELLITUS: ICD-10-CM

## 2024-07-03 DIAGNOSIS — Z13.0 SCREENING FOR DEFICIENCY ANEMIA: ICD-10-CM

## 2024-07-03 DIAGNOSIS — F32.A ANXIETY AND DEPRESSION: Primary | ICD-10-CM

## 2024-07-03 PROBLEM — R51.9 PREGNANCY HEADACHE IN SECOND TRIMESTER: Status: RESOLVED | Noted: 2023-10-26 | Resolved: 2024-07-03

## 2024-07-03 PROBLEM — O99.340 ANXIETY DURING PREGNANCY: Status: RESOLVED | Noted: 2023-12-27 | Resolved: 2024-07-03

## 2024-07-03 PROBLEM — R10.11 RUQ PAIN: Status: RESOLVED | Noted: 2024-01-30 | Resolved: 2024-07-03

## 2024-07-03 PROBLEM — O26.892 PREGNANCY HEADACHE IN SECOND TRIMESTER: Status: RESOLVED | Noted: 2023-10-26 | Resolved: 2024-07-03

## 2024-07-03 LAB
ALBUMIN SERPL BCG-MCNC: 4 G/DL (ref 3.5–5)
ALP SERPL-CCNC: 45 U/L (ref 34–104)
ALT SERPL W P-5'-P-CCNC: 30 U/L (ref 7–52)
ANION GAP SERPL CALCULATED.3IONS-SCNC: 10 MMOL/L (ref 4–13)
AST SERPL W P-5'-P-CCNC: 21 U/L (ref 13–39)
BASOPHILS # BLD AUTO: 0.05 THOUSANDS/ÂΜL (ref 0–0.1)
BASOPHILS NFR BLD AUTO: 1 % (ref 0–1)
BILIRUB SERPL-MCNC: 0.56 MG/DL (ref 0.2–1)
BUN SERPL-MCNC: 17 MG/DL (ref 5–25)
CALCIUM SERPL-MCNC: 9.3 MG/DL (ref 8.4–10.2)
CHLORIDE SERPL-SCNC: 105 MMOL/L (ref 96–108)
CO2 SERPL-SCNC: 22 MMOL/L (ref 21–32)
CREAT SERPL-MCNC: 0.64 MG/DL (ref 0.6–1.3)
EOSINOPHIL # BLD AUTO: 0.05 THOUSAND/ÂΜL (ref 0–0.61)
EOSINOPHIL NFR BLD AUTO: 1 % (ref 0–6)
ERYTHROCYTE [DISTWIDTH] IN BLOOD BY AUTOMATED COUNT: 12.3 % (ref 11.6–15.1)
FERRITIN SERPL-MCNC: 30 NG/ML (ref 11–307)
GFR SERPL CREATININE-BSD FRML MDRD: 121 ML/MIN/1.73SQ M
GLUCOSE SERPL-MCNC: 73 MG/DL (ref 65–140)
HCT VFR BLD AUTO: 46.2 % (ref 34.8–46.1)
HGB BLD-MCNC: 15.4 G/DL (ref 11.5–15.4)
IMM GRANULOCYTES # BLD AUTO: 0.03 THOUSAND/UL (ref 0–0.2)
IMM GRANULOCYTES NFR BLD AUTO: 0 % (ref 0–2)
IRON SATN MFR SERPL: 47 % (ref 15–50)
IRON SERPL-MCNC: 136 UG/DL (ref 50–212)
LYMPHOCYTES # BLD AUTO: 2.08 THOUSANDS/ÂΜL (ref 0.6–4.47)
LYMPHOCYTES NFR BLD AUTO: 28 % (ref 14–44)
MCH RBC QN AUTO: 30 PG (ref 26.8–34.3)
MCHC RBC AUTO-ENTMCNC: 33.3 G/DL (ref 31.4–37.4)
MCV RBC AUTO: 90 FL (ref 82–98)
MONOCYTES # BLD AUTO: 0.52 THOUSAND/ÂΜL (ref 0.17–1.22)
MONOCYTES NFR BLD AUTO: 7 % (ref 4–12)
NEUTROPHILS # BLD AUTO: 4.77 THOUSANDS/ÂΜL (ref 1.85–7.62)
NEUTS SEG NFR BLD AUTO: 63 % (ref 43–75)
NRBC BLD AUTO-RTO: 0 /100 WBCS
PLATELET # BLD AUTO: 234 THOUSANDS/UL (ref 149–390)
PMV BLD AUTO: 12.3 FL (ref 8.9–12.7)
POTASSIUM SERPL-SCNC: 4.4 MMOL/L (ref 3.5–5.3)
PROT SERPL-MCNC: 7.1 G/DL (ref 6.4–8.4)
RBC # BLD AUTO: 5.13 MILLION/UL (ref 3.81–5.12)
SODIUM SERPL-SCNC: 137 MMOL/L (ref 135–147)
TIBC SERPL-MCNC: 288 UG/DL (ref 250–450)
UIBC SERPL-MCNC: 152 UG/DL (ref 155–355)
WBC # BLD AUTO: 7.5 THOUSAND/UL (ref 4.31–10.16)

## 2024-07-03 PROCEDURE — 82728 ASSAY OF FERRITIN: CPT

## 2024-07-03 PROCEDURE — 85025 COMPLETE CBC W/AUTO DIFF WBC: CPT

## 2024-07-03 PROCEDURE — 83550 IRON BINDING TEST: CPT

## 2024-07-03 PROCEDURE — 36415 COLL VENOUS BLD VENIPUNCTURE: CPT

## 2024-07-03 PROCEDURE — 99213 OFFICE O/P EST LOW 20 MIN: CPT | Performed by: NURSE PRACTITIONER

## 2024-07-03 PROCEDURE — 80053 COMPREHEN METABOLIC PANEL: CPT

## 2024-07-03 PROCEDURE — 83540 ASSAY OF IRON: CPT

## 2024-07-03 RX ORDER — ESCITALOPRAM OXALATE 10 MG/1
10 TABLET ORAL DAILY
Qty: 90 TABLET | Refills: 1 | Status: SHIPPED | OUTPATIENT
Start: 2024-07-03 | End: 2024-12-30

## 2024-07-03 NOTE — ASSESSMENT & PLAN NOTE
4 months postpartum  She is bonding well with her baby. No SI or HI  Has good family support with her  and her parents   Restart lexapro 5mg x 1 week, then increase to 10mg daily  Follow up in 6 weeks, sooner if needed

## 2024-07-03 NOTE — TELEPHONE ENCOUNTER
Pt called in stating that the pharmacy never received the script for diflucan. Upon review, prescription was not e-prescribed. Advised pt that a verbal order will be provided to pharmacy. Pt thankful.    Call to [facility], sent to prescriber [employer]. Provided information for diflucan exactly as written in epic. Call back number provided.

## 2024-07-03 NOTE — PROGRESS NOTES
" Assessment/Plan:    Problem List Items Addressed This Visit       Anxiety and depression - Primary     4 months postpartum  She is bonding well with her baby. No SI or HI  Has good family support with her  and her parents   Restart lexapro 5mg x 1 week, then increase to 10mg daily  Follow up in 6 weeks, sooner if needed         Relevant Medications    escitalopram (LEXAPRO) 10 mg tablet     Other Visit Diagnoses       Postpartum depression        Relevant Medications    escitalopram (LEXAPRO) 10 mg tablet    Screening for deficiency anemia        Relevant Orders    CBC and differential    History of iron deficiency        Relevant Orders    Iron Panel (Includes Ferritin, Iron Sat%, Iron, and TIBC)    Screening for diabetes mellitus        Relevant Orders    Comprehensive metabolic panel                   M*Modal software was used to dictate this note.  It may contain errors with dictating incorrect words or incorrect spelling. Please contact the provider directly with any questions.    Subjective:      Patient ID: Sanchez Shields is a 29 y.o. female.    HPI    Patient presents today to discuss postpartum anxiety and depression. She is now 4 months postpartum of a baby girl \"Ollie\" she is bonding well with her baby. She has no thoughts of hurting herself or her baby.   She does feel she has been more tearful lately and feeling down and stressed at times. They are currently living with her parents  to save money to buy a house.   She is now back to work part time. Her father watches her daughter during the day.   Her  is working 15 hour days in his new position.   Overall she feels things are going well and she is very happy with her daughter, but just feels that her emotions are out of balance.   [facility] 2/3rd trimesters. She would like to restart her lexapro.   She is no longer breastfeeding.     She tells me she was advised to take iron and calcium throughout her " pregnancy  She is now on iron, calcium and a prenatal.       The following portions of the patient's history were reviewed and updated as appropriate: allergies, current medications, past family history, past medical history, past social history, past surgical history, and problem list.    Review of Systems   Constitutional:  Negative for activity change, appetite change and unexpected weight change.   Psychiatric/Behavioral:  Positive for dysphoric mood. Negative for self-injury and suicidal ideas. The patient is nervous/anxious.          Past Medical History:   Diagnosis Date    Acute sinusitis 2019    Acute suppurative otitis media of right ear without spontaneous rupture of tympanic membrane 02/15/2019    Allergic 1996    Sulfa    Anxiety     JESSICA with Tx of Lexapro    Asthma     stable on albuterol inhaler    Central retinal vein occlusion of left eye     vision spared, some spots    GERD (gastroesophageal reflux disease)     Pre and post gallbladder removal    Headache(784.0) Early 20's    Symptom of menstruation    Situational anxiety 2018    managed with no medications currently     (spontaneous vaginal delivery) 2023    Blood Type: A. +        Pap 2022. Neg , GC/CT   PN1 Labs:  NML + early glucose NML,  H&H: 13.8/42.1  28 Week Labs: HB 12.5/ 119/ RPR NR  AFP:  Level 1:  Level 2:  Tdap: 1/10/24  Flu:   GBS swab:      Blue folder:Given   Yellow folder:      Breast pump order:  L&D forms:     Delivery consent: 23  IOL:                Viral URI with cough 2018    Visual impairment 2020    OS CRVO with Tx of eye injections         Current Outpatient Medications:     CALCIUM PO, Take by mouth daily, Disp: , Rfl:     escitalopram (LEXAPRO) 10 mg tablet, Take 1 tablet (10 mg total) by mouth daily, Disp: 90 tablet, Rfl: 1    Ferrous Sulfate (Iron) 28 MG TABS, Take by mouth in the morning, Disp: , Rfl:     MAGNESIUM PO, Take by mouth daily, Disp: , Rfl:      "Prenatal Vit-Fe Fumarate-FA (PRENATAL VITAMIN PO), Take by mouth daily, Disp: , Rfl:     fluconazole (DIFLUCAN) 150 mg tablet, Take 1 tablet (150 mg total) by mouth every third day for 2 doses (Patient not taking: Reported on 7/3/2024), Disp: 2 tablet, Rfl: 0    Allergies   Allergen Reactions    Sprintec 28 [Norgestimate-Eth Estradiol] Other (See Comments)     Blood clot in eye    Sulfa Antibiotics Rash       Social History   Past Surgical History:   Procedure Laterality Date    CHOLECYSTECTOMY  12/2017    WISDOM TOOTH EXTRACTION       Family History   Problem Relation Age of Onset    Hypertension Mother     Diabetes Mother     Hyperlipidemia Mother     SRUTHI disease Mother     Mental illness Mother         Anxiety    Anxiety disorder Mother     Hyperlipidemia Father     Asthma Father     COPD Father     Substance Abuse Father     Drug abuse Father     Stroke Maternal Grandmother     Hypertension Maternal Grandmother     Dementia Maternal Grandmother     Prostate cancer Maternal Grandfather     Lung cancer Maternal Grandfather     Stroke Paternal Grandmother     Diabetes Paternal Grandfather     Heart disease Paternal Grandfather     Substance Abuse Paternal Uncle     Drug abuse Paternal Uncle     Arthritis Maternal Aunt     Bipolar disorder Paternal Aunt     Completed Suicide  Cousin     Suicide Attempts Cousin        Objective:  /68 (BP Location: Left arm, Patient Position: Sitting, Cuff Size: Adult)   Pulse 70   Temp 99.7 °F (37.6 °C) (Temporal)   Ht 5' 5\" (1.651 m)   Wt 81.6 kg (180 lb)   SpO2 98%   BMI 29.95 kg/m²      Physical Exam  Vitals reviewed.   Constitutional:       General: She is not in acute distress.     Appearance: Normal appearance. She is not diaphoretic.   HENT:      Head: Normocephalic and atraumatic.   Eyes:      Extraocular Movements: Extraocular movements intact.      Conjunctiva/sclera: Conjunctivae normal.      Pupils: Pupils are equal, round, and reactive to light. "   Cardiovascular:      Rate and Rhythm: Normal rate and regular rhythm.      Heart sounds: Normal heart sounds. No murmur heard.  Pulmonary:      Effort: Pulmonary effort is normal. No respiratory distress.      Breath sounds: Normal breath sounds. No wheezing, rhonchi or rales.   Neurological:      Mental Status: She is alert and oriented to person, place, and time. Mental status is at baseline.   Psychiatric:         Mood and Affect: Mood normal.         Behavior: Behavior normal.

## 2024-07-30 ENCOUNTER — ANNUAL EXAM (OUTPATIENT)
Dept: OBGYN CLINIC | Facility: CLINIC | Age: 29
End: 2024-07-30
Payer: COMMERCIAL

## 2024-07-30 VITALS
WEIGHT: 180.4 LBS | SYSTOLIC BLOOD PRESSURE: 104 MMHG | DIASTOLIC BLOOD PRESSURE: 80 MMHG | HEIGHT: 62 IN | BODY MASS INDEX: 33.2 KG/M2

## 2024-07-30 DIAGNOSIS — N88.0: ICD-10-CM

## 2024-07-30 DIAGNOSIS — Z01.419 ENCNTR FOR GYN EXAM (GENERAL) (ROUTINE) W/O ABN FINDINGS: Primary | ICD-10-CM

## 2024-07-30 PROCEDURE — S0612 ANNUAL GYNECOLOGICAL EXAMINA: HCPCS | Performed by: PHYSICIAN ASSISTANT

## 2024-07-30 PROCEDURE — G0145 SCR C/V CYTO,THINLAYER,RESCR: HCPCS | Performed by: PHYSICIAN ASSISTANT

## 2024-07-30 NOTE — Clinical Note
Just a FYI- this pt used to see you for GYN care. She had leukoplakia on her cervix with exam today so I recommended she have a colpo. She was very excited to hear she could schedule her colpo with you :) so you may be seeing her on your schedule in the future. She is a very sweet patient

## 2024-07-30 NOTE — PROGRESS NOTES
Assessment   29 y.o.  presenting for annual exam.     Plan   Diagnoses and all orders for this visit:    Encntr for gyn exam (general) (routine) w/o abn findings  -     Liquid-based pap, screening    Leukoplakia, cervix        Pap collected today  Leukoplakia noted on exam- recommended RTO for colpo regardless of pap results to determine if biopsies are warranted. Reviewed I do not perform colpos. She would like to see Jess Mckeon as she saw this provider for GYN care in the past. Will have pt schedule colpo with Jess at check out    Perineal hygiene reviewed. Weight bearing exercises minium of 150 mins/weekly advised. Kegel exercises recommended.   SBE encouraged, A yearly mammogram is recommended for breast cancer screening starting at age 40. ASCCP guidelines reviewed. Condoms encouraged with all sexual activity to prevent STI's. Gardisil vaccines recommended up to age 45. Calcium/ Vit D dietary requirements discussed.   Advised to call with any issues, all concerns & questions addressed.   See provided information in your after visit summary     F/U Annually and PRN    Results will be released to [employer], if abnormal will call or message to review and discuss treatment plan.     __________________________________________________________________    Subjective     Sanchez Shields is a 29 y.o.  presenting for annual exam. She is without complaint.    Patient is doing well.  Baby Ollie is doing great!    She has stopped breast-feeding.  She has had 2 menstrual cycle since stopping breast-feeding.  Reports vulvar itching and irritation about a week prior to each menstrual cycle.  Most recently treated with Diflucan and resolved.  After this happened she made some changes in her soaps.  She is not using Dove bar soap, using an unscented detergent.  She is using organic tampons and pads.  She tries to avoid wearing underwear when she can.  Will see if symptoms persist with next cycle    EPDS-  7    SCREENING  Last Pap: 3/2022 negative      GYN  The patient's menstrual history is as follows:    ;  ; Period Cycle (Days): 28; Period Duration (Days): 5-6; Period Pattern: Regular; Menstrual Flow: Heavy; Dysmenorrhea: (!) Moderate; Dysmenorrhea Symptoms: Cramping, Nausea, Diarrhea, Headache    Sexually active: Yes - single partner - male  Contraception: condoms     Hx Abnormal pap: denies  We reviewed ASCCP guidelines for Pap testing today.  Gardasil: She has completed the Gardasil series.    Denies vaginal discharge, itching, odor, dyspareunia, pelvic pain and vulvar/vaginal symptoms      OB         Complaints: denies   Denies urgency, frequency, hematuria, leakage / change in stream, difficulty urinating.       BREAST  Complaints: denies   Denies: breast lump, breast tenderness, nipple discharge, skin color change, and skin lesion(s)  Personal hx: none      Pertinent Family Hx:   Family hx of breast cancer: no  Family hx of ovarian cancer: no  Family hx of colon cancer: no      GENERAL  PMH reviewed/updated and is as below.   Patient does follow with a PCP.    SOCIAL  Smoking: no  Alcohol:no  Drug: no  Occupation:       Past Medical History:   Diagnosis Date    Acute sinusitis 2019    Acute suppurative otitis media of right ear without spontaneous rupture of tympanic membrane 02/15/2019    Allergic 1996    Sulfa    Anxiety     JESSICA with Tx of Lexapro    Asthma     stable on albuterol inhaler    Central retinal vein occlusion of left eye     vision spared, some spots    GERD (gastroesophageal reflux disease)     Pre and post gallbladder removal    Headache(784.0) Early 20's    Symptom of menstruation    Situational anxiety 2018    managed with no medications currently     (spontaneous vaginal delivery) 2023    Blood Type: A. +        Pap 2022. Neg , GC/CT   PN1 Labs:  NML + early glucose NML,  H&H: 13.8/42.1  28 Week Labs: HB 12.5/ 119/ RPR NR   AFP:  Level 1:  Level 2:  Tdap: 1/10/24  Flu:   GBS swab:      Blue folder:Given   Yellow folder:      Breast pump order:  L&D forms:     Delivery consent: 12/27/23  IOL:                Viral URI with cough 08/23/2018    Visual impairment 09/2020    OS CRVO with Tx of eye injections       Past Surgical History:   Procedure Laterality Date    CHOLECYSTECTOMY  12/2017    WISDOM TOOTH EXTRACTION           Current Outpatient Medications:     escitalopram (LEXAPRO) 10 mg tablet, Take 1 tablet (10 mg total) by mouth daily, Disp: 90 tablet, Rfl: 1    MAGNESIUM PO, Take by mouth daily, Disp: , Rfl:     Prenatal Vit-Fe Fumarate-FA (PRENATAL VITAMIN PO), Take by mouth daily, Disp: , Rfl:     Allergies   Allergen Reactions    Sprintec 28 [Norgestimate-Eth Estradiol] Other (See Comments)     Blood clot in eye    Sulfa Antibiotics Rash       Social History     Socioeconomic History    Marital status: /Civil Union     Spouse name: Not on file    Number of children: Not on file    Years of education: Not on file    Highest education level: Not on file   Occupational History    Not on file   Tobacco Use    Smoking status: Never    Smokeless tobacco: Never   Vaping Use    Vaping status: Never Used   Substance and Sexual Activity    Alcohol use: Not Currently     Comment: social drinking prior to pregnancy    Drug use: No    Sexual activity: Yes     Partners: Male     Birth control/protection: None     Comment: Currently not on OCP after 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 CRVO   Other Topics Concern    Not on file   Social History Narrative    Not on file     Social Determinants of Health     Financial Resource Strain: Not on file   Food Insecurity: No Food Insecurity (3/18/2024)    Hunger Vital Sign     Worried About Running Out of Food in the Last Year: Never true     Ran Out of Food in the Last Year: Never true   Transportation Needs: No Transportation Needs (3/18/2024)    PRAPARE - Transportation     Lack of Transportation (Medical): No      "Lack of Transportation (Non-Medical): No   Physical Activity: Not on file   Stress: Not on file   Social Connections: Not on file   Intimate Partner Violence: Not on file   Housing Stability: Low Risk  (3/18/2024)    Housing Stability Vital Sign     Unable to Pay for Housing in the Last Year: No     Number of Times Moved in the Last Year: 1     Homeless in the Last Year: No       Review of Systems     ROS:  Constitutional: Negative for fatigue and unexpected weight change.   Respiratory: Negative for cough and shortness of breath.    Cardiovascular: Negative for chest pain and palpitations.   Gastrointestinal: Negative for abdominal pain and change in bowel habits  Breasts:  Negative, other than as noted above.   Genitourinary: Negative, other than as noted above.   Psychiatric: Negative for mood difficulties.      Objective      /80 (BP Location: Left arm, Patient Position: Sitting, Cuff Size: Standard)   Ht 5' 2\" (1.575 m)   Wt 81.8 kg (180 lb 6.4 oz)   LMP 07/10/2024 (Exact Date)   Breastfeeding No   BMI 33.00 kg/m²     Physical Examination:    Patient appears well and is not in distress  Neck is supple without masses, no cervical or supraclavicular lymphadenopathy  Cardiovascular: regular rate and rhythm; no murmurs  Lungs: clear to auscultation bilaterally; no wheezes  Breasts are symmetrical without mass, tenderness, nipple discharge, skin changes or adenopathy.   Abdomen is soft and nontender without masses.   External genitals are normal without lesions or rashes.  Urethral meatus and urethra are normal  Bladder is normal to palpation  Vagina is normal without discharge or bleeding.   Cervix is normal without discharge. Eversion noted. Leukoplakia noted at 12 and 6 o'clock  Uterus is normal, mobile, nontender without palpable mass.  Adnexa are normal, nontender, without palpable mass.                 "

## 2024-08-06 LAB
LAB AP GYN PRIMARY INTERPRETATION: NORMAL
Lab: NORMAL

## 2024-08-07 ENCOUNTER — RA CDI HCC (OUTPATIENT)
Dept: OTHER | Facility: HOSPITAL | Age: 29
End: 2024-08-07

## 2024-09-11 NOTE — PROGRESS NOTES
"Colposcopy    Date/Time: 9/12/2024 8:43 AM    Performed by: FELIPE Contreras  Authorized by: FELIPE Contreras    Other Assisting Provider: No    Verbal consent obtained?: Yes    Written consent obtained?: Yes    Risks and benefits: Risks, benefits and alternatives were discussed    Consent given by:  Patient  Time Out:     Time out: Immediately prior to the procedure a time out was called      Time out performed at:  9/12/2024 8:43 AM  Patient states understanding of procedure being performed: Yes    Patient's understanding of procedure matches consent: Yes    Procedure consent matches procedure scheduled: Yes    Relevant documents present and verified: Yes    Patient identity confirmed:  Verbally with patient  Pre-procedure:     Prepped with: acetic acid    Indication:     Indications: NOrmal pap 7/30/24 but leukoplakia noted on exam that day.  Procedure:     Procedure: Colposcopy w/ biopsy of cervix      Under satisfactory analgesia the patient was prepped and draped in the dorsal lithotomy position: yes      [place] speculum was placed in the vagina: yes      Cervix was cleansed with betadine: yes      Under colposcopic examination the transition zone was seen in entirety: yes      Cervical biopsy performed with a cervical biopsy punch: yes (6:00, 10:00, 12:00 at transformation zone. !2:00 was more of a scraping than solid piece of tissue)      Monsel's solution was applied: yes      Allis/Tenaculum removed and cervix homostatic: yes      Specimen(s) to pathology: yes    Post-procedure:     Findings: Bleeding and White epithelium      Impression: Low grade cervical dysplasia      Patient tolerance of procedure:  Procedure terminated at patient's request  Comments:      Notes from annual visit exam on 7/30/24 states: \"Leukoplakia noted at 12 and 6 o'clock\"      "

## 2024-09-12 ENCOUNTER — PROCEDURE VISIT (OUTPATIENT)
Dept: OBGYN CLINIC | Facility: MEDICAL CENTER | Age: 29
End: 2024-09-12
Payer: COMMERCIAL

## 2024-09-12 VITALS
SYSTOLIC BLOOD PRESSURE: 132 MMHG | WEIGHT: 187 LBS | HEIGHT: 62 IN | DIASTOLIC BLOOD PRESSURE: 82 MMHG | BODY MASS INDEX: 34.41 KG/M2

## 2024-09-12 DIAGNOSIS — N88.0: Primary | ICD-10-CM

## 2024-09-12 DIAGNOSIS — Z32.02 PREGNANCY TEST NEGATIVE: ICD-10-CM

## 2024-09-12 LAB — SL AMB POCT URINE HCG: NORMAL

## 2024-09-12 PROCEDURE — 57421 EXAM/BIOPSY OF VAG W/SCOPE: CPT | Performed by: NURSE PRACTITIONER

## 2024-09-12 PROCEDURE — 88305 TISSUE EXAM BY PATHOLOGIST: CPT | Performed by: PATHOLOGY

## 2024-09-12 PROCEDURE — 88342 IMHCHEM/IMCYTCHM 1ST ANTB: CPT | Performed by: PATHOLOGY

## 2024-09-12 PROCEDURE — 88313 SPECIAL STAINS GROUP 2: CPT | Performed by: PATHOLOGY

## 2024-09-12 PROCEDURE — 81025 URINE PREGNANCY TEST: CPT | Performed by: NURSE PRACTITIONER

## 2024-09-12 NOTE — PATIENT INSTRUCTIONS
Take ibuprofen with food as needed for cramping. Follow directions on container.   Light vaginal bleeding with a slight brown or black color (coffee ground appearance) is normal for several days.   Call office with vaginal bleeding heavier than a normal menses.   No sexual activity x 7 days.    You may see your biopsy results on [employer] before I see the results. As soon as I review them, you will get a [employer] message or phone call to discuss the results and plan of care.

## 2024-09-19 ENCOUNTER — PATIENT MESSAGE (OUTPATIENT)
Dept: INTERNAL MEDICINE CLINIC | Facility: OTHER | Age: 29
End: 2024-09-19

## 2024-09-19 DIAGNOSIS — F41.9 ANXIETY AND DEPRESSION: ICD-10-CM

## 2024-09-19 DIAGNOSIS — F32.A ANXIETY AND DEPRESSION: ICD-10-CM

## 2024-09-19 RX ORDER — ESCITALOPRAM OXALATE 10 MG/1
15 TABLET ORAL DAILY
Start: 2024-09-19 | End: 2024-09-19 | Stop reason: SDUPTHER

## 2024-09-20 PROCEDURE — 88313 SPECIAL STAINS GROUP 2: CPT | Performed by: PATHOLOGY

## 2024-09-20 PROCEDURE — 88342 IMHCHEM/IMCYTCHM 1ST ANTB: CPT | Performed by: PATHOLOGY

## 2024-09-20 PROCEDURE — 88305 TISSUE EXAM BY PATHOLOGIST: CPT | Performed by: PATHOLOGY

## 2024-09-20 RX ORDER — ESCITALOPRAM OXALATE 10 MG/1
15 TABLET ORAL DAILY
Qty: 45 TABLET | Refills: 0 | Status: SHIPPED | OUTPATIENT
Start: 2024-09-20 | End: 2025-03-19

## 2024-10-17 ENCOUNTER — OFFICE VISIT (OUTPATIENT)
Dept: INTERNAL MEDICINE CLINIC | Facility: OTHER | Age: 29
End: 2024-10-17
Payer: COMMERCIAL

## 2024-10-17 VITALS
SYSTOLIC BLOOD PRESSURE: 110 MMHG | HEART RATE: 75 BPM | DIASTOLIC BLOOD PRESSURE: 64 MMHG | BODY MASS INDEX: 32.07 KG/M2 | HEIGHT: 63 IN | TEMPERATURE: 97.8 F | WEIGHT: 181 LBS | OXYGEN SATURATION: 98 %

## 2024-10-17 DIAGNOSIS — Z13.1 SCREENING FOR DIABETES MELLITUS: ICD-10-CM

## 2024-10-17 DIAGNOSIS — Z13.220 SCREENING FOR LIPID DISORDERS: ICD-10-CM

## 2024-10-17 DIAGNOSIS — Z00.00 ANNUAL PHYSICAL EXAM: Primary | ICD-10-CM

## 2024-10-17 DIAGNOSIS — F32.A ANXIETY AND DEPRESSION: ICD-10-CM

## 2024-10-17 DIAGNOSIS — Z13.0 SCREENING FOR DEFICIENCY ANEMIA: ICD-10-CM

## 2024-10-17 DIAGNOSIS — F41.9 ANXIETY AND DEPRESSION: ICD-10-CM

## 2024-10-17 PROCEDURE — 99395 PREV VISIT EST AGE 18-39: CPT | Performed by: NURSE PRACTITIONER

## 2024-10-17 RX ORDER — ESCITALOPRAM OXALATE 10 MG/1
15 TABLET ORAL DAILY
Qty: 135 TABLET | Refills: 1 | Status: SHIPPED | OUTPATIENT
Start: 2024-10-17 | End: 2025-04-15

## 2024-10-17 NOTE — PATIENT INSTRUCTIONS
"Patient Education     Routine physical for adults   The Basics   Written by the doctors and editors at [facility]   What is a physical? -- A physical is a routine visit, or \"check-up,\" with your doctor. You might also hear it called a \"wellness visit\" or \"preventive visit.\"  During each visit, the doctor will:   Ask about your physical and mental health   Ask about your habits, behaviors, and lifestyle   Do an exam   Give you vaccines if needed   Talk to you about any medicines you take   Give advice about your health   Answer your questions  Getting regular check-ups is an important part of taking care of your health. It can help your doctor find and treat any problems you have. But it's also important for preventing health problems.  A routine physical is different from a \"sick visit.\" A sick visit is when you see a doctor because of a health concern or problem. Since physicals are scheduled ahead of time, you can think about what you want to ask the doctor.  How often should I get a physical? -- It depends on your age and health. In general, for people age 21 years and older:   If you are younger than 50 years, you might be able to get a physical every 3 years.   If you are 50 years or older, your doctor might recommend a physical every year.  If you have an ongoing health condition, like diabetes or high blood pressure, your doctor will probably want to see you more often.  What happens during a physical? -- In general, each visit will include:   Physical exam - The doctor or nurse will check your height, weight, heart rate, and blood pressure. They will also look at your eyes and ears. They will ask about how you are feeling and whether you have any symptoms that bother you.   Medicines - It's a good idea to bring a list of all the medicines you take to each doctor visit. Your doctor will talk to you about your medicines and answer any questions. Tell them if you are having any side effects that bother you. You " "should also tell them if you are having trouble paying for any of your medicines.   Habits and behaviors - This includes:   Your diet   Your exercise habits   Whether you smoke, drink alcohol, or use drugs   Whether you are sexually active   Whether you feel safe at home  Your doctor will talk to you about things you can do to improve your health and lower your risk of health problems. They will also offer help and support. For example, if you want to quit smoking, they can give you advice and might prescribe medicines. If you want to improve your diet or get more physical activity, they can help you with this, too.   Lab tests, if needed - The tests you get will depend on your age and situation. For example, your doctor might want to check your:   Cholesterol   Blood sugar   Iron level   Vaccines - The recommended vaccines will depend on your age, health, and what vaccines you already had. Vaccines are very important because they can prevent certain serious or deadly infections.   Discussion of screening - \"Screening\" means checking for diseases or other health problems before they cause symptoms. Your doctor can recommend screening based on your age, risk, and preferences. This might include tests to check for:   Cancer, such as breast, prostate, cervical, ovarian, colorectal, prostate, lung, or skin cancer   Sexually transmitted infections, such as chlamydia and gonorrhea   Mental health conditions like depression and anxiety  Your doctor will talk to you about the different types of screening tests. They can help you decide which screenings to have. They can also explain what the results might mean.   Answering questions - The physical is a good time to ask the doctor or nurse questions about your health. If needed, they can refer you to other doctors or specialists, too.  Adults older than 65 years often need other care, too. As you get older, your doctor will talk to you about:   How to prevent falling at " home   Hearing or vision tests   Memory testing   How to take your medicines safely   Making sure that you have the help and support you need at home  All topics are updated as new evidence becomes available and our peer review process is complete.  This topic retrieved from [employer] on: May 02, 2024.  Topic 019497 Version 1.0  Release: 32.4.3 - C32.122  © 2024 UpToDate, Inc. and/or its affiliates. All rights reserved.  Consumer Information Use and Disclaimer   Disclaimer: This generalized information is a limited summary of diagnosis, treatment, and/or medication information. It is not meant to be comprehensive and should be used as a tool to help the user understand and/or assess potential diagnostic and treatment options. It does NOT include all information about conditions, treatments, medications, side effects, or risks that may apply to a specific patient. It is not intended to be medical advice or a substitute for the medical advice, diagnosis, or treatment of a health care provider based on the health care provider's examination and assessment of a patient's specific and unique circumstances. Patients must speak with a health care provider for complete information about their health, medical questions, and treatment options, including any risks or benefits regarding use of medications. This information does not endorse any treatments or medications as safe, effective, or approved for treating a specific patient. UpToDate, Inc. and its affiliates disclaim any warranty or liability relating to this information or the use thereof.The use of this information is governed by the Terms of Use, available at https://www.[employer].com/en/know/clinical-effectiveness-terms. 2024© UpToDate, Inc. and its affiliates and/or licensors. All rights reserved.  Copyright   © 2024 UpToDate, Inc. and/or its affiliates. All rights reserved.

## 2024-10-17 NOTE — ASSESSMENT & PLAN NOTE
Symptoms controlled on lexapro 15mg daily  Follow up 6 months   Depression Screening Follow-up Plan: Patient's depression screening was positive with a PHQ-2 score of . Their PHQ-9 score was 3. Patient with underlying depression and was advised to continue current medications as prescribed.

## 2024-10-17 NOTE — ASSESSMENT & PLAN NOTE
Healthy 29 yr old female  Going away this weekend so deferred flu vaccine today but plans to get it next week   Continue healthy diet and routine exercise  Update routine labs as ordered

## 2024-10-17 NOTE — PROGRESS NOTES
Adult Annual Physical  Name: Sanchez Shields      : 1995      MRN: [id number]  Encounter Provider: FELIPE Serna  Encounter Date: 10/17/2024   Encounter department: [facility] [place]    Assessment & Plan  Anxiety and depression  Symptoms controlled on lexapro 15mg daily  Follow up 6 months   Depression Screening Follow-up Plan: Patient's depression screening was positive with a PHQ-2 score of . Their PHQ-9 score was 3. Patient with underlying depression and was advised to continue current medications as prescribed.         Annual physical exam  Healthy 29 yr old female  Going away this weekend so deferred flu vaccine today but plans to get it next week   Continue healthy diet and routine exercise  Update routine labs as ordered         Immunizations and preventive care screenings were discussed with patient today. Appropriate education was printed on patient's after visit summary.         History of Present Illness     Adult Annual Physical:  Patient presents for annual physical. Patient presents today for routine physical and follow up  A month ago we increased her lexapro to 15 mg daily. She feels her symptoms are well controlled. No SI or HI. .     Diet and Physical Activity:  - Diet/Nutrition: well balanced diet.  - Exercise: walking.    Depression Screening:    - PHQ-9 Score: 3    General Health:  - Sleep: sleeps well. sometimes not as well due to her baby  - Hearing: normal hearing bilateral ears.  - Vision: goes for regular eye exams.  - Dental: regular dental visits.    /GYN Health:  - Follows with GYN: yes.   - Menopause: premenopausal.   - Contraception: barrier methods.      Review of Systems   Constitutional:  Negative for activity change, appetite change, chills, diaphoresis, fatigue, fever and unexpected weight change.   Eyes:  Negative for visual disturbance.   Respiratory:  Negative for chest tightness, shortness of breath and wheezing.     Cardiovascular:  Negative for chest pain and palpitations.   Gastrointestinal:  Negative for abdominal distention, abdominal pain, blood in stool, constipation, diarrhea, nausea and vomiting.   Genitourinary:  Negative for difficulty urinating, dysuria, frequency, hematuria and menstrual problem.   Neurological:  Negative for dizziness, light-headedness and headaches.   Psychiatric/Behavioral:  Negative for dysphoric mood and sleep disturbance. The patient is nervous/anxious (improved).      Medical History Reviewed by provider this encounter:  Tobacco  Allergies  Meds  Problems  Med Hx  Surg Hx  Fam Hx       Past Medical History   Past Medical History:   Diagnosis Date    Acute sinusitis 2019    Acute suppurative otitis media of right ear without spontaneous rupture of tympanic membrane 02/15/2019    Allergic 1996    Sulfa    Anxiety     JESSICA with Tx of Lexapro    Asthma     stable on albuterol inhaler    Central retinal vein occlusion of left eye     vision spared, some spots    GERD (gastroesophageal reflux disease)     Pre and post gallbladder removal    Headache(784.0) Early 20's    Symptom of menstruation    Situational anxiety 2018    managed with no medications currently     (spontaneous vaginal delivery) 2023    Blood Type: A. +        Pap 2022. Neg , GC/CT   PN1 Labs:  NML + early glucose NML,  H&H: 13.8/42.1  28 Week Labs: HB 12.5/ 119/ RPR NR  AFP:  Level 1:  Level 2:  Tdap: 1/10/24  Flu:   GBS swab:      Blue folder:Given   Yellow folder:      Breast pump order:  L&D forms:     Delivery consent: 23  IOL:                Viral URI with cough 2018    Visual impairment 2020    OS CRVO with Tx of eye injections     Past Surgical History:   Procedure Laterality Date    CHOLECYSTECTOMY  2017    WISDOM TOOTH EXTRACTION       Family History   Problem Relation Age of Onset    Hypertension Mother     Diabetes Mother     Hyperlipidemia Mother      SRUTHI disease Mother     Mental illness Mother         Anxiety    Anxiety disorder Mother     Hyperlipidemia Father     Asthma Father     COPD Father     Substance Abuse Father     Drug abuse Father     Stroke Maternal Grandmother     Hypertension Maternal Grandmother     Dementia Maternal Grandmother     Prostate cancer Maternal Grandfather     Lung cancer Maternal Grandfather     Stroke Paternal Grandmother     Diabetes Paternal Grandfather     Heart disease Paternal Grandfather     Arthritis Maternal Aunt     Bipolar disorder Paternal Aunt     Substance Abuse Paternal Uncle     Drug abuse Paternal Uncle     Completed Suicide  Cousin     Suicide Attempts Cousin     Cervical cancer Cousin     Breast cancer Neg Hx     Colon cancer Neg Hx     Ovarian cancer Neg Hx     Uterine cancer Neg Hx      Current Outpatient Medications on File Prior to Visit   Medication Sig Dispense Refill    escitalopram (LEXAPRO) 10 mg tablet Take 1.5 tablets (15 mg total) by mouth daily 45 tablet 0    MAGNESIUM PO Take by mouth daily      Prenatal Vit-Fe Fumarate-FA (PRENATAL VITAMIN PO) Take by mouth daily       No current facility-administered medications on file prior to visit.     Allergies   Allergen Reactions    Sprintec 28 [Norgestimate-Eth Estradiol] Other (See Comments)     Blood clot in eye    Sulfa Antibiotics Rash      Current Outpatient Medications on File Prior to Visit   Medication Sig Dispense Refill    escitalopram (LEXAPRO) 10 mg tablet Take 1.5 tablets (15 mg total) by mouth daily 45 tablet 0    MAGNESIUM PO Take by mouth daily      Prenatal Vit-Fe Fumarate-FA (PRENATAL VITAMIN PO) Take by mouth daily       No current facility-administered medications on file prior to visit.      Social History     Tobacco Use    Smoking status: Never    Smokeless tobacco: Never   Vaping Use    Vaping status: Never Used   Substance and Sexual Activity    Alcohol use: Not Currently     Comment: social drinking prior to pregnancy    Drug use:  "No    Sexual activity: Yes     Partners: Male     Birth control/protection: None     Comment: Currently not on OCP after 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 CRVO       Objective     /64 (BP Location: Left arm, Patient Position: Sitting, Cuff Size: Standard)   Pulse 75   Temp 97.8 °F (36.6 °C) (Temporal)   Ht 5' 3\" (1.6 m)   Wt 82.1 kg (181 lb)   SpO2 98%   BMI 32.06 kg/m²     Physical Exam  Vitals reviewed.   Constitutional:       General: She is not in acute distress.     Appearance: Normal appearance. She is well-developed. She is not diaphoretic.   HENT:      Head: Normocephalic and atraumatic.      Right Ear: Tympanic membrane and external ear normal.      Left Ear: Tympanic membrane and external ear normal.      Nose: Nose normal. No mucosal edema, congestion or rhinorrhea.      Mouth/Throat:      Mouth: Mucous membranes are moist.      Pharynx: Oropharynx is clear. No oropharyngeal exudate or posterior oropharyngeal erythema.   Eyes:      Extraocular Movements: Extraocular movements intact.      Conjunctiva/sclera: Conjunctivae normal.      Pupils: Pupils are equal, round, and reactive to light.   Neck:      Thyroid: No thyromegaly.   Cardiovascular:      Rate and Rhythm: Normal rate and regular rhythm.      Heart sounds: Normal heart sounds. No murmur heard.  Pulmonary:      Effort: Pulmonary effort is normal. No respiratory distress.      Breath sounds: Normal breath sounds. No decreased breath sounds, wheezing, rhonchi or rales.   Abdominal:      General: Bowel sounds are normal. There is no distension.      Palpations: Abdomen is soft. There is no mass.      Tenderness: There is no abdominal tenderness. There is no guarding.   Musculoskeletal:         General: Normal range of motion.      Cervical back: Normal range of motion and neck supple. No edema.      Right lower leg: No edema.      Left lower leg: No edema.   Lymphadenopathy:      Cervical: No cervical adenopathy.      Upper Body:      Right upper body: No " supraclavicular, axillary, pectoral or epitrochlear adenopathy.      Left upper body: No supraclavicular, axillary, pectoral or epitrochlear adenopathy.   Skin:     General: Skin is warm.      Findings: No rash.   Neurological:      Mental Status: She is alert and oriented to person, place, and time. Mental status is at baseline.      Motor: No weakness or abnormal muscle tone.      Gait: Gait normal.      Deep Tendon Reflexes: Reflexes are normal and symmetric.   Psychiatric:         Mood and Affect: Mood normal.         Behavior: Behavior normal.         Thought Content: Thought content normal.         Judgment: Judgment normal.

## 2024-11-06 ENCOUNTER — OFFICE VISIT (OUTPATIENT)
Dept: INTERNAL MEDICINE CLINIC | Facility: OTHER | Age: 29
End: 2024-11-06
Payer: COMMERCIAL

## 2024-11-06 VITALS
BODY MASS INDEX: 32.24 KG/M2 | TEMPERATURE: 98.6 F | WEIGHT: 182 LBS | SYSTOLIC BLOOD PRESSURE: 106 MMHG | DIASTOLIC BLOOD PRESSURE: 60 MMHG | OXYGEN SATURATION: 98 % | HEART RATE: 80 BPM

## 2024-11-06 DIAGNOSIS — R05.1 ACUTE COUGH: Primary | ICD-10-CM

## 2024-11-06 DIAGNOSIS — J45.20 MILD INTERMITTENT ASTHMA WITHOUT COMPLICATION: ICD-10-CM

## 2024-11-06 LAB
SARS-COV-2 AG UPPER RESP QL IA: NEGATIVE
VALID CONTROL: NORMAL

## 2024-11-06 PROCEDURE — 99213 OFFICE O/P EST LOW 20 MIN: CPT

## 2024-11-06 PROCEDURE — 87811 SARS-COV-2 COVID19 W/OPTIC: CPT

## 2024-11-06 RX ORDER — ALBUTEROL SULFATE 90 UG/1
2 INHALANT RESPIRATORY (INHALATION) EVERY 6 HOURS PRN
Qty: 8.5 G | Refills: 0 | Status: SHIPPED | OUTPATIENT
Start: 2024-11-06

## 2024-11-06 NOTE — PROGRESS NOTES
Ambulatory Visit  Name: Sanchez Shields      : 1995      MRN: [id number]  Encounter Provider: Serena Taylor PA-C  Encounter Date: 2024   Encounter department: [facility]    Assessment & Plan  Acute cough  URI symptoms x 4 days  Symptoms likely viral in nature  Advised Flonase twice daily, second-generation antihistamine such as Zyrtec daily, decongestant daily  Advised increased rest, fluid intake  Discussed symptomatic care including salt water gargles for sore throat, steam showers for congestion, warm liquids   Patient can take Tylenol/ibuprofen for fevers and body aches  Discussed red flag symptoms including going to the ER with chest pain or shortness of breath  Discussed course of illness could be 1 to 2 weeks.   Return to the office for reevaluation if symptoms do not improve in 1-2 weeks     Orders:    POCT Rapid Covid Ag    Mild intermittent asthma without complication  Asthma overall well-controlled.  Albuterol for patient have on hand for as needed use for shortness of breath or wheezing  Orders:    albuterol (ProAir HFA) 90 mcg/act inhaler; Inhale 2 puffs every 6 (six) hours as needed for wheezing       History of Present Illness     Patient is a 29-year-old female presenting to the office for URI symptoms x 3-4 days   Notes she recently traveled home from [place]  Started on  with congestion, cough, plugged ear sensation, sore throat, postnasal drip  Denies shortness of breath or wheezing  Does have a history of asthma, however has not needed albuterol inhaler in several years    Tx tried: Mucinex, theraflu, robutussin     URI   This is a new problem. The current episode started in the past 7 days. The problem has been unchanged. There has been no fever. Associated symptoms include congestion, coughing, a plugged ear sensation (right), rhinorrhea and a sore throat. Pertinent negatives include no abdominal pain, chest pain, diarrhea,  headaches, nausea, sinus pain, vomiting or wheezing. She has tried decongestant for the symptoms.         Review of Systems   Constitutional:  Positive for fatigue. Negative for chills and fever.   HENT:  Positive for congestion, postnasal drip, rhinorrhea and sore throat. Negative for sinus pressure, sinus pain and trouble swallowing.    Eyes:  Negative for discharge and redness.   Respiratory:  Positive for cough and shortness of breath (with coughing episodes). Negative for chest tightness and wheezing.    Cardiovascular:  Negative for chest pain and palpitations.   Gastrointestinal:  Negative for abdominal pain, diarrhea, nausea and vomiting.   Neurological:  Negative for dizziness, light-headedness and headaches.     Medical History Reviewed by provider this encounter:           Objective     /60 (BP Location: Left arm, Patient Position: Sitting, Cuff Size: Standard)   Pulse 80   Temp 98.6 °F (37 °C) (Temporal)   Wt 82.6 kg (182 lb)   SpO2 98%   BMI 32.24 kg/m²     Physical Exam  Vitals and nursing note reviewed.   Constitutional:       General: She is not in acute distress.     Appearance: Normal appearance. She is not ill-appearing.   HENT:      Head: Normocephalic and atraumatic.      Right Ear: Ear canal and external ear normal. A middle ear effusion is present. Tympanic membrane is not erythematous.      Left Ear: Ear canal and external ear normal. A middle ear effusion is present. Tympanic membrane is not erythematous.      Nose: Nose normal.      Mouth/Throat:      Mouth: Mucous membranes are moist.      Pharynx: Oropharynx is clear. Uvula midline. Postnasal drip present. No posterior oropharyngeal erythema or uvula swelling.      Tonsils: No tonsillar exudate.   Eyes:      General: No scleral icterus.     Conjunctiva/sclera: Conjunctivae normal.      Pupils: Pupils are equal, round, and reactive to light.   Cardiovascular:      Rate and Rhythm: Normal rate and regular rhythm.      Heart  sounds: Normal heart sounds. No murmur heard.     No friction rub. No gallop.   Pulmonary:      Effort: Pulmonary effort is normal. No respiratory distress.      Breath sounds: Normal breath sounds. No wheezing, rhonchi or rales.   Chest:      Chest wall: No tenderness.   Musculoskeletal:      Cervical back: No tenderness.   Lymphadenopathy:      Cervical: No cervical adenopathy.   Skin:     General: Skin is warm and dry.      Coloration: Skin is not pale.      Findings: No erythema, lesion or rash.   Neurological:      General: No focal deficit present.      Mental Status: She is alert and oriented to person, place, and time. Mental status is at baseline.   Psychiatric:         Mood and Affect: Mood normal.         Behavior: Behavior normal.       Administrative Statements     Disclaimer: This note was generated with voice recognition software.  Phonetic, grammatical, and spelling errors may be present as a result.  Please contact provider with any concerns or questions

## 2024-11-06 NOTE — ASSESSMENT & PLAN NOTE
Asthma overall well-controlled.  Albuterol for patient have on hand for as needed use for shortness of breath or wheezing  Orders:    albuterol (ProAir HFA) 90 mcg/act inhaler; Inhale 2 puffs every 6 (six) hours as needed for wheezing

## 2024-11-06 NOTE — PATIENT INSTRUCTIONS
Advise Flonase twice daily, second-generation antihistamine such as Zyrtec daily, decongestant daily  Advise increased rest, fluid intake  Discussed symptomatic care including salt water gargles for sore throat, steam showers for congestion, warm liquids   Patient can take Tylenol/ibuprofen for fevers and body aches  Discussed red flag symptoms including going to the ER with chest pain or shortness of breath  Discussed course of illness could be 1 to 2 weeks.   Return to the office for reevaluation if symptoms do not improve in 1-2 weeks

## 2024-12-07 ENCOUNTER — APPOINTMENT (OUTPATIENT)
Dept: LAB | Facility: IMAGING CENTER | Age: 29
End: 2024-12-07
Payer: COMMERCIAL

## 2024-12-07 DIAGNOSIS — Z13.0 SCREENING FOR DEFICIENCY ANEMIA: ICD-10-CM

## 2024-12-07 DIAGNOSIS — Z13.1 SCREENING FOR DIABETES MELLITUS: ICD-10-CM

## 2024-12-07 DIAGNOSIS — Z13.220 SCREENING FOR LIPID DISORDERS: ICD-10-CM

## 2024-12-07 LAB
ALBUMIN SERPL BCG-MCNC: 4.1 G/DL (ref 3.5–5)
ALP SERPL-CCNC: 37 U/L (ref 34–104)
ALT SERPL W P-5'-P-CCNC: 15 U/L (ref 7–52)
ANION GAP SERPL CALCULATED.3IONS-SCNC: 8 MMOL/L (ref 4–13)
AST SERPL W P-5'-P-CCNC: 15 U/L (ref 13–39)
BASOPHILS # BLD AUTO: 0.07 THOUSANDS/ÂΜL (ref 0–0.1)
BASOPHILS NFR BLD AUTO: 1 % (ref 0–1)
BILIRUB SERPL-MCNC: 0.57 MG/DL (ref 0.2–1)
BUN SERPL-MCNC: 15 MG/DL (ref 5–25)
CALCIUM SERPL-MCNC: 9 MG/DL (ref 8.4–10.2)
CHLORIDE SERPL-SCNC: 105 MMOL/L (ref 96–108)
CHOLEST SERPL-MCNC: 194 MG/DL (ref ?–200)
CO2 SERPL-SCNC: 24 MMOL/L (ref 21–32)
CREAT SERPL-MCNC: 0.7 MG/DL (ref 0.6–1.3)
EOSINOPHIL # BLD AUTO: 0.14 THOUSAND/ÂΜL (ref 0–0.61)
EOSINOPHIL NFR BLD AUTO: 2 % (ref 0–6)
ERYTHROCYTE [DISTWIDTH] IN BLOOD BY AUTOMATED COUNT: 12.3 % (ref 11.6–15.1)
GFR SERPL CREATININE-BSD FRML MDRD: 118 ML/MIN/1.73SQ M
GLUCOSE P FAST SERPL-MCNC: 101 MG/DL (ref 65–99)
HCT VFR BLD AUTO: 43.4 % (ref 34.8–46.1)
HDLC SERPL-MCNC: 51 MG/DL
HGB BLD-MCNC: 14.4 G/DL (ref 11.5–15.4)
IMM GRANULOCYTES # BLD AUTO: 0.02 THOUSAND/UL (ref 0–0.2)
IMM GRANULOCYTES NFR BLD AUTO: 0 % (ref 0–2)
LDLC SERPL CALC-MCNC: 128 MG/DL (ref 0–100)
LYMPHOCYTES # BLD AUTO: 2.77 THOUSANDS/ÂΜL (ref 0.6–4.47)
LYMPHOCYTES NFR BLD AUTO: 36 % (ref 14–44)
MCH RBC QN AUTO: 29.4 PG (ref 26.8–34.3)
MCHC RBC AUTO-ENTMCNC: 33.2 G/DL (ref 31.4–37.4)
MCV RBC AUTO: 89 FL (ref 82–98)
MONOCYTES # BLD AUTO: 0.59 THOUSAND/ÂΜL (ref 0.17–1.22)
MONOCYTES NFR BLD AUTO: 8 % (ref 4–12)
NEUTROPHILS # BLD AUTO: 4.14 THOUSANDS/ÂΜL (ref 1.85–7.62)
NEUTS SEG NFR BLD AUTO: 53 % (ref 43–75)
NRBC BLD AUTO-RTO: 0 /100 WBCS
PLATELET # BLD AUTO: 233 THOUSANDS/UL (ref 149–390)
PMV BLD AUTO: 12.7 FL (ref 8.9–12.7)
POTASSIUM SERPL-SCNC: 4.1 MMOL/L (ref 3.5–5.3)
PROT SERPL-MCNC: 6.9 G/DL (ref 6.4–8.4)
RBC # BLD AUTO: 4.9 MILLION/UL (ref 3.81–5.12)
SODIUM SERPL-SCNC: 137 MMOL/L (ref 135–147)
TRIGL SERPL-MCNC: 76 MG/DL (ref ?–150)
WBC # BLD AUTO: 7.73 THOUSAND/UL (ref 4.31–10.16)

## 2024-12-07 PROCEDURE — 80061 LIPID PANEL: CPT

## 2024-12-07 PROCEDURE — 85025 COMPLETE CBC W/AUTO DIFF WBC: CPT

## 2024-12-07 PROCEDURE — 36415 COLL VENOUS BLD VENIPUNCTURE: CPT

## 2024-12-07 PROCEDURE — 80053 COMPREHEN METABOLIC PANEL: CPT

## 2024-12-12 ENCOUNTER — RESULTS FOLLOW-UP (OUTPATIENT)
Dept: INTERNAL MEDICINE CLINIC | Facility: OTHER | Age: 29
End: 2024-12-12

## 2025-02-01 DIAGNOSIS — F41.9 ANXIETY AND DEPRESSION: ICD-10-CM

## 2025-02-01 DIAGNOSIS — F32.A ANXIETY AND DEPRESSION: ICD-10-CM

## 2025-02-01 RX ORDER — ESCITALOPRAM OXALATE 10 MG/1
15 TABLET ORAL DAILY
Qty: 135 TABLET | Refills: 1 | Status: SHIPPED | OUTPATIENT
Start: 2025-02-01 | End: 2025-07-31

## 2025-02-03 ENCOUNTER — TELEPHONE (OUTPATIENT)
Age: 30
End: 2025-02-03

## 2025-02-03 NOTE — TELEPHONE ENCOUNTER
Pt called to inform the practice that they will be moving out of the state and will no longer be needing our services. She does not have a PCP yet in [place].    Thanks

## 2025-05-02 DIAGNOSIS — F32.A ANXIETY AND DEPRESSION: ICD-10-CM

## 2025-05-02 DIAGNOSIS — F41.9 ANXIETY AND DEPRESSION: ICD-10-CM

## 2025-05-02 RX ORDER — ESCITALOPRAM OXALATE 10 MG/1
15 TABLET ORAL DAILY
Qty: 135 TABLET | Refills: 0 | Status: SHIPPED | OUTPATIENT
Start: 2025-05-02 | End: 2025-10-29

## 2025-05-02 NOTE — TELEPHONE ENCOUNTER
ALTERNATE PHARMACY:  OUT OF STATE REQUEST  Patient states moved to [place] 2 months ago and is still in the process of getting insurance to establish care with a provider is her new area. Patient requesting 90 day script sent to local Pharmacy to cover her until she is established.     Reason for call:   [x] Refill   [] Prior Auth  [] Other:     Office:   [x] PCP/Provider -   [] Specialty/Provider -     Medication: escitalopram (LEXAPRO) 10 mg     Dose/Frequency:  Take 1.5 tablets (15 mg total) by mouth daily     Quantity: 135    Pharmacy: [facility]/pharmacy #[id number] [place], [place] - [address] [place] [address]     Local Pharmacy   Does the patient have enough for 3 days?   [] Yes   [x] No - Send as HP to POD    Mail Away Pharmacy   Does the patient have enough for 10 days?   [] Yes   [] No - Send as HP to POD

## 2025-07-31 DIAGNOSIS — F32.A ANXIETY AND DEPRESSION: ICD-10-CM

## 2025-07-31 DIAGNOSIS — F41.9 ANXIETY AND DEPRESSION: ICD-10-CM

## 2025-08-01 RX ORDER — ESCITALOPRAM OXALATE 10 MG/1
15 TABLET ORAL DAILY
Qty: 135 TABLET | Refills: 0 | OUTPATIENT
Start: 2025-08-01